# Patient Record
Sex: FEMALE | Race: WHITE | NOT HISPANIC OR LATINO | URBAN - METROPOLITAN AREA
[De-identification: names, ages, dates, MRNs, and addresses within clinical notes are randomized per-mention and may not be internally consistent; named-entity substitution may affect disease eponyms.]

---

## 2017-02-14 ENCOUNTER — INPATIENT (INPATIENT)
Facility: HOSPITAL | Age: 82
LOS: 2 days | Discharge: NURSING, REHAB OR LONG-TERM CARE | End: 2017-02-17
Attending: INTERNAL MEDICINE | Admitting: INTERNAL MEDICINE
Payer: MEDICARE

## 2017-02-14 VITALS — WEIGHT: 154.98 LBS | HEIGHT: 68 IN

## 2017-02-14 DIAGNOSIS — I48.91 UNSPECIFIED ATRIAL FIBRILLATION: ICD-10-CM

## 2017-02-14 DIAGNOSIS — R04.0 EPISTAXIS: ICD-10-CM

## 2017-02-14 DIAGNOSIS — K92.2 GASTROINTESTINAL HEMORRHAGE, UNSPECIFIED: ICD-10-CM

## 2017-02-14 DIAGNOSIS — D64.9 ANEMIA, UNSPECIFIED: ICD-10-CM

## 2017-02-14 DIAGNOSIS — K92.1 MELENA: ICD-10-CM

## 2017-02-14 DIAGNOSIS — E03.9 HYPOTHYROIDISM, UNSPECIFIED: ICD-10-CM

## 2017-02-14 DIAGNOSIS — M48.00 SPINAL STENOSIS, SITE UNSPECIFIED: ICD-10-CM

## 2017-02-14 DIAGNOSIS — I10 ESSENTIAL (PRIMARY) HYPERTENSION: ICD-10-CM

## 2017-02-14 LAB
ALBUMIN SERPL ELPH-MCNC: 2.6 G/DL — LOW (ref 3.3–5)
ALP SERPL-CCNC: 72 U/L — SIGNIFICANT CHANGE UP (ref 40–120)
ALT FLD-CCNC: 8 U/L — LOW (ref 12–78)
ANION GAP SERPL CALC-SCNC: 10 MMOL/L — SIGNIFICANT CHANGE UP (ref 5–17)
APTT BLD: 47 SEC — HIGH (ref 27.5–37.4)
AST SERPL-CCNC: 15 U/L — SIGNIFICANT CHANGE UP (ref 15–37)
BASOPHILS # BLD AUTO: 0.1 K/UL — SIGNIFICANT CHANGE UP (ref 0–0.2)
BASOPHILS NFR BLD AUTO: 1.1 % — SIGNIFICANT CHANGE UP (ref 0–2)
BILIRUB SERPL-MCNC: 0.6 MG/DL — SIGNIFICANT CHANGE UP (ref 0.2–1.2)
BLD GP AB SCN SERPL QL: SIGNIFICANT CHANGE UP
BUN SERPL-MCNC: 31 MG/DL — HIGH (ref 7–23)
CALCIUM SERPL-MCNC: 7.5 MG/DL — LOW (ref 8.5–10.1)
CHLORIDE SERPL-SCNC: 106 MMOL/L — SIGNIFICANT CHANGE UP (ref 96–108)
CO2 SERPL-SCNC: 26 MMOL/L — SIGNIFICANT CHANGE UP (ref 22–31)
CREAT SERPL-MCNC: 1.66 MG/DL — HIGH (ref 0.5–1.3)
EOSINOPHIL # BLD AUTO: 0 K/UL — SIGNIFICANT CHANGE UP (ref 0–0.5)
EOSINOPHIL NFR BLD AUTO: 0.1 % — SIGNIFICANT CHANGE UP (ref 0–6)
GLUCOSE SERPL-MCNC: 118 MG/DL — HIGH (ref 70–99)
HCT VFR BLD CALC: 21.4 % — LOW (ref 34.5–45)
HGB BLD-MCNC: 6.9 G/DL — CRITICAL LOW (ref 11.5–15.5)
INR BLD: 5.15 RATIO — CRITICAL HIGH (ref 0.88–1.16)
LYMPHOCYTES # BLD AUTO: 17 % — SIGNIFICANT CHANGE UP (ref 13–44)
LYMPHOCYTES # BLD AUTO: 2.3 K/UL — SIGNIFICANT CHANGE UP (ref 1–3.3)
MCHC RBC-ENTMCNC: 32.4 GM/DL — SIGNIFICANT CHANGE UP (ref 32–36)
MCHC RBC-ENTMCNC: 33.4 PG — SIGNIFICANT CHANGE UP (ref 27–34)
MCV RBC AUTO: 103.3 FL — HIGH (ref 80–100)
MONOCYTES # BLD AUTO: 0.8 K/UL — SIGNIFICANT CHANGE UP (ref 0–0.9)
MONOCYTES NFR BLD AUTO: 6.1 % — SIGNIFICANT CHANGE UP (ref 2–14)
NEUTROPHILS # BLD AUTO: 10.2 K/UL — HIGH (ref 1.8–7.4)
NEUTROPHILS NFR BLD AUTO: 75.8 % — SIGNIFICANT CHANGE UP (ref 43–77)
PLATELET # BLD AUTO: 165 K/UL — SIGNIFICANT CHANGE UP (ref 150–400)
POTASSIUM SERPL-MCNC: 4.8 MMOL/L — SIGNIFICANT CHANGE UP (ref 3.5–5.3)
POTASSIUM SERPL-SCNC: 4.8 MMOL/L — SIGNIFICANT CHANGE UP (ref 3.5–5.3)
PROT SERPL-MCNC: 5.4 GM/DL — LOW (ref 6–8.3)
PROTHROM AB SERPL-ACNC: 57.6 SEC — HIGH (ref 10–13.1)
RBC # BLD: 2.07 M/UL — LOW (ref 3.8–5.2)
RBC # FLD: 14.5 % — SIGNIFICANT CHANGE UP (ref 10.3–14.5)
SODIUM SERPL-SCNC: 142 MMOL/L — SIGNIFICANT CHANGE UP (ref 135–145)
TROPONIN I SERPL-MCNC: 0.02 NG/ML — SIGNIFICANT CHANGE UP (ref 0.01–0.04)
TROPONIN I SERPL-MCNC: 0.02 NG/ML — SIGNIFICANT CHANGE UP (ref 0.01–0.04)
TYPE + AB SCN PNL BLD: SIGNIFICANT CHANGE UP
WBC # BLD: 13.4 K/UL — HIGH (ref 3.8–10.5)
WBC # FLD AUTO: 13.4 K/UL — HIGH (ref 3.8–10.5)

## 2017-02-14 PROCEDURE — 93010 ELECTROCARDIOGRAM REPORT: CPT

## 2017-02-14 PROCEDURE — 99291 CRITICAL CARE FIRST HOUR: CPT

## 2017-02-14 RX ORDER — SODIUM CHLORIDE 9 MG/ML
1000 INJECTION INTRAMUSCULAR; INTRAVENOUS; SUBCUTANEOUS ONCE
Qty: 0 | Refills: 0 | Status: COMPLETED | OUTPATIENT
Start: 2017-02-14 | End: 2017-02-14

## 2017-02-14 RX ORDER — PHYTONADIONE (VIT K1) 5 MG
5 TABLET ORAL ONCE
Qty: 0 | Refills: 0 | Status: COMPLETED | OUTPATIENT
Start: 2017-02-14 | End: 2017-02-14

## 2017-02-14 RX ORDER — PANTOPRAZOLE SODIUM 20 MG/1
40 TABLET, DELAYED RELEASE ORAL EVERY 12 HOURS
Qty: 0 | Refills: 0 | Status: DISCONTINUED | OUTPATIENT
Start: 2017-02-14 | End: 2017-02-15

## 2017-02-14 RX ADMIN — SODIUM CHLORIDE 1000 MILLILITER(S): 9 INJECTION INTRAMUSCULAR; INTRAVENOUS; SUBCUTANEOUS at 15:05

## 2017-02-14 RX ADMIN — Medication 101 MILLIGRAM(S): at 17:04

## 2017-02-14 NOTE — H&P ADULT - NSHPLABSRESULTS_GEN_ALL_CORE
CBC Full  -  ( 14 Feb 2017 15:14 )  WBC Count : 13.4 K/uL  Hemoglobin : 6.9 g/dL  Hematocrit : 21.4 %  Platelet Count - Automated : 165 K/uL  Mean Cell Volume : 103.3 fl  Mean Cell Hemoglobin : 33.4 pg  Mean Cell Hemoglobin Concentration : 32.4 gm/dL  Auto Neutrophil # : 10.2 K/uL  Auto Lymphocyte # : 2.3 K/uL  Auto Monocyte # : 0.8 K/uL  Auto Eosinophil # : 0.0 K/uL  Auto Basophil # : 0.1 K/uL  Auto Neutrophil % : 75.8 %  Auto Lymphocyte % : 17.0 %  Auto Monocyte % : 6.1 %  Auto Eosinophil % : 0.1 %  Auto Basophil % : 1.1 %    14 Feb 2017 15:14    142    |  106    |  31     ----------------------------<  118    4.8     |  26     |  1.66     Ca    7.5        14 Feb 2017 15:14    TPro  5.4    /  Alb  2.6    /  TBili  0.6    /  DBili  x      /  AST  15     /  ALT  8      /  AlkPhos  72     14 Feb 2017 15:14    PT/INR - ( 14 Feb 2017 15:14 )   PT: 57.6 sec;   INR: 5.15 ratio         PTT - ( 14 Feb 2017 15:14 )  PTT:47.0 sec    Vital Signs Last 24 Hrs  T(C): 36.8, Max: 36.8 (02-14 @ 18:40)  T(F): 98.3, Max: 98.3 (02-14 @ 19:30)  HR: 60 (60 - 66)  BP: 118/70 (65/45 - 126/67)  BP(mean): --  RR: 15 (14 - 19)  SpO2: 100% (88% - 100%)    Vital Signs Last 24 Hrs  T(C): 36.8, Max: 36.8 (02-14 @ 18:40)  T(F): 98.3, Max: 98.3 (02-14 @ 19:30)  HR: 60 (60 - 66)  BP: 118/70 (65/45 - 126/67)  BP(mean): --  RR: 15 (14 - 19)  SpO2: 100% (88% - 100%)

## 2017-02-14 NOTE — ED PROVIDER NOTE - MEDICAL DECISION MAKING DETAILS
Elderly female, on coumadin for a fib, with recent nosebleeds and black stools for one week, c/o weaknes for a few days.  Pt initially hypotensive with melanotic stools but no active diarrheaor vomiting. Pt resuscitated initially with fluids, the prbc and ffr and vit k to reverse coumadin.  Pt improving.  Pt unable to have ct angio to pinpoint source of bleeding due to high creatinine.  [place] [facility] [place] and recommend continuing resuscitation.  After bp improved to greater than 100, intensivist declined pt for ICU.  Pt alert and awake and in NAD.  Daughter at bedside.  Pt and daughter informed of results.

## 2017-02-14 NOTE — ED STATDOCS - DETAILS:
I, Billy Oviedo MD, performed the initial face to face bedside interview with this patient regarding history of present illness and determined that the patient should be seen in the main ED.  The history, was documented by the scribe in my presence and I attest to the accuracy of the documentation.

## 2017-02-14 NOTE — ED PROVIDER NOTE - DETAILS:
Documentation shared with rah Musa as noted.  Agree with notes Documentation shared with rah Musa as noted.  Agree with notes.

## 2017-02-14 NOTE — H&P ADULT - PSH
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

## 2017-02-14 NOTE — ED STATDOCS - PROGRESS NOTE DETAILS
Bienvenidothomas Singh: 85 y/o female with PMHx of afib on coumadin presents to the ED brought in by family c/o generalized weakness. Family at bedside states that they noticed dark blood in the toilet today and that over the last few weeks pt has been having intermittent episode of diarrhea. Family also reports pt having a nose bleed. Currently pt has no other complaints and denies chest pain, SOB and vomiting. PMD Dr. Jacome. Pt sent to the main for further eval and tx.

## 2017-02-14 NOTE — ED PROVIDER NOTE - PMH
Anemia    Atrial Fibrillation  2007  on baby aspirin to maintain as per Dr Fernandez  CAD (Coronary Artery Disease) of Bypass Graft  bypass times 1,  12/2007 [facility]  Cardiac Arrest  2007- lead to CABG and mitral valve repair  Cataract  implants 2007  HTN - Hypertension  1988  Hyperlipidemia    Hypothyroid  last TFT's 2 weeks ago - normal  Mitral valve repair  CAD 12/2007 placed on coumadin to remain as per Dr Fernandez  Osteoarthritis  spine, joints  Pacemaker  LACW 2010 - Medtronic ERENDIRA/ [id number] - [place] /[facility]  Spinal Stenosis

## 2017-02-14 NOTE — ED PROVIDER NOTE - CHPI ED SYMPTOMS NEG
no decreased eating/drinking/no chills/no fever/no pain/no dizziness/no numbness/no nausea/no tingling/no vomiting

## 2017-02-14 NOTE — ED PROVIDER NOTE - OBJECTIVE STATEMENT
83 y/o female with a h/o anemia, c/o generalized weakness over the passed few weeks. Daughter at bedside pt has had frequent episodes of epistaxis during this winter. +epistaxis today. 85 y/o female with a h/o anemia, thyroidectomy, c/o generalized weakness over the passed few weeks. Daughter at bedside pt has had frequent episodes of epistaxis during this winter. +epistaxis today. Pt reports dark stools x1 week. Dr. Duff (PMD). 85 y/o female with a h/o anemia, thyroidectomy, c/o generalized weakness over the passed few weeks. Daughter at bedside pt has had frequent episodes of epistaxis during this winter. +epistaxis today. Pt reports dark stools x1 week. Dr. Duff (PMD).Pt had dark stools may years ago and had gi workup which could not locate the source of the bleed.  No colonoscopy or gi doctor recently. Pt is on coumadin for a fib.  Unsure of hx of chf.

## 2017-02-14 NOTE — H&P ADULT - NSHPPHYSICALEXAM_GEN_ALL_CORE
PHYSICAL EXAM:      Constitutional: NAD, well-groomed, well-developed  HEENT: PERRLA, EOMI, Normal Hearing  Neck: No LAD, No JVD  Back: No CVA tenderness  Respiratory: CTA B/l Cardiovascular: S1 and S2, RRR,   Gastrointestinal: BS+, soft, NT/ND  Extremities: No peripheral edema  Vascular: 2+ peripheral pulses  Neurological: A/O x 3, no focal deficits  Psychiatric: Normal mood, normal affect  Musculoskeletal: 5/5 strength b/l upper and lower extremities  Skin: No rashes

## 2017-02-14 NOTE — H&P ADULT - PROBLEM SELECTOR PLAN 1
-Coumadin held  -NPO  -IVF as needed  -s/p 4 units of blood   -s/p 2 U FFP  -s/p  Vitamin K  -Monitor CBC Q6h  -Transfuse PRN maintain hgb >8  -GI to scope once INR lower  - Pt has not noted any recurrent bleeding since admission -Coumadin held  -NPO  -IVF as needed  -IV PPI  -s/p 4 units of blood   -s/p 2 U FFP  -s/p  Vitamin K  -Monitor CBC Q6h  -Transfuse PRN maintain hgb >8  -GI to scope once INR lower  - Pt has not noted any recurrent bleeding since admission -pt was complaining of epigastric pain for the past 2 days. Currently denies epigastric pain. Likely UGIB vs LGIB.  -Coumadin held  -NPO  -IVF as needed  -IV PPI  -s/p 4 units of blood   -s/p 2 U FFP  -s/p  Vitamin K  -Monitor CBC Q6h  -Transfuse PRN maintain hgb >8  -GI to scope once INR lower  - Pt has not noted any recurrent bleeding since admission

## 2017-02-14 NOTE — ED PROVIDER NOTE - CRITICAL CARE PROVIDED
documentation/consult w/ pt's family directly relating to pts condition/direct patient care (not related to procedure)

## 2017-02-14 NOTE — H&P ADULT - ASSESSMENT
Pt is 83 y/o F with PMHx as above admitted with supratheraputic INR with epistaxis and GI bleed. Pt given 4 units of blood, Vitamin K and 2 uFFP in ED and 1L of NS bolus due to Hypotension and Gastroenetrology consult appreciated.  Admit to telemetry for monitoring due to recurrent blood loss and significant Cardiac history.

## 2017-02-14 NOTE — ED PROVIDER NOTE - CARE PLAN
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

## 2017-02-14 NOTE — ED PROVIDER NOTE - NS ED MD SCRIBE ATTENDING SCRIBE SECTIONS
REVIEW OF SYSTEMS/PAST MEDICAL/SURGICAL/SOCIAL HISTORY/PHYSICAL EXAM/HISTORY OF PRESENT ILLNESS/RESULTS

## 2017-02-14 NOTE — H&P ADULT - PMH
Anemia    Atrial Fibrillation  2007  on baby aspirin to maintain as per Dr Fernandez  CAD (Coronary Artery Disease) of Bypass Graft  bypass times 1,  12/2007 [facility]  Cardiac Arrest  2007- lead to CABG and mitral valve repair  Cataract  implants 2007  HTN - Hypertension  1988  Hyperlipidemia    Hypothyroid  last TFT's 2 weeks ago - normal  Mitral valve repair  CAD 12/2007 placed on coumadin to remain as per Dr Fernandez  Osteoarthritis  spine, joints  Pacemaker  LACW 2010 - Medtronic ERENDIRA/ [id number] - [place] /[facility]  Spinal Stenosis

## 2017-02-14 NOTE — CONSULT NOTE ADULT - PROBLEM SELECTOR RECOMMENDATION 2
continue transfusion of prbc and ffp  pt bp improving with fluids and transfusion  will need gi endoscopic (most likely lower gi bleed) evaluation when stable and inr improved and further corrected  ICU to evaluate  dr ayan grace to assume care tomorrow  npo for now

## 2017-02-14 NOTE — H&P ADULT - HISTORY OF PRESENT ILLNESS
83 y/o female with a h/o anemia, thyroidectomy, atrial fibrilation on coumadin, MI with cardiac arrest resulting in CABG, MV repair  c/o epistaxis and generalized weakness over the passed few weeks wit hseveral episodes of black stools and intermittent diarrhea. Daughter at bedside pt has had frequent episodes of epistaxis during this winter. Epistaxis worse today and lasting over 1 hr intermittently. Pt notes that she has never had any history of GI bleed in the past. Notes having colonoscopy one year ago which was normal at the time. She recieves coumadin monitoring from PCP. 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 Daughter at bedside pt has had frequent episodes of epistaxis during this winter. Epistaxis worse today and lasting over 1 hr intermittently. Pt notes that she has never had any history of GI bleed in the past. Notes having colonoscopy one year ago which was normal at the time. She recieves coumadin monitoring from PCP.

## 2017-02-14 NOTE — CONSULT NOTE ADULT - SUBJECTIVE AND OBJECTIVE BOX
Patient is a 84y old  Female who presents with a chief complaint of GI bleed    HPI:  85yo female brought to ER by family with GI bleed. She was found to have mulitple episodes of dark stool, dark marroon in color.  Also with increased diarrhea last 2 weeks. denies abdominal pain chest pain, or SOB.  notes some weakness. no further episodes      PAST MEDICAL & SURGICAL HISTORY:  Cataract: implants   Cardiac Arrest: - lead to CABG and mitral valve repair  Spinal Stenosis  Osteoarthritis: spine, joints  Hypothyroid: last TFT&#x27;s 2 weeks ago - normal  CAD (Coronary Artery Disease) of Bypass Graft: bypass times 1,  2007 [facility]  Mitral valve repair: CAD 2007 placed on coumadin to remain as per Dr Fernandez  Hyperlipidemia  HTN - Hypertension:   Pacemaker: LACW  - [employer] ADDROI/ [id number] - [facility]  Atrial Fibrillation:   on baby aspirin to maintain as per Dr Fernandez  Depression: 3 years  History of Tonsillectomy: as child  Blepharoptosis:  - bilateral  partial thyroidectomy:   Breast Cyst: left - benign   hysterectomy:   Facet block: of spine   Spinal Disorder: spinal stimulator trial  - then removed  pacemaker: LACW  Medtronic ADDROI/[id number]  mitral valve repair/ CAB bypass times 1 - [facility]      MEDICATIONS  (STANDING):  see med rec    MEDICATIONS  (PRN):      Allergies    amoxicillin (Rash)  Daypro (Other)  Keflex (Rash)  Tikosyn (Unknown)    Intolerances        SOCIAL HISTORY: lives with son. no tob no etoh    FAMILY HISTORY: noncontributory      REVIEW OF SYSTEMS:    CONSTITUTIONAL: No fevers or chills. notes some weakness  EYES/ENT: No visual changes;  No vertigo or throat pain   NECK: No pain or stiffness  RESPIRATORY: No cough, wheezing, hemoptysis; No shortness of breath  CARDIOVASCULAR: No chest pain or palpitations  GASTROINTESTINAL: No abdominal or epigastric pain. No nausea, vomiting, or hematemesis; recent diarrhea now with hematochezia  GENITOURINARY: No dysuria, frequency or hematuria  NEUROLOGICAL: No numbness or weakness  SKIN: No itching, burning, rashes, or lesions   PSYCH: Normal mood and affect  All other review of systems is negative unless indicated above.    Vital Signs Last 24 Hrs  T(C): 36.7, Max: 36.7 ( @ 19:00)  T(F): 98, Max: 98 ( @ 19:00)  HR: 60 (60 - 66)  BP: 103/59 (65/45 - 103/59)  BP(mean): --  RR: 17 (15 - 19)  SpO2: 95% (88% - 100%)    PHYSICAL EXAM:    Constitutional: NAD, well-developed  HEENT: MMM  Neck: No LAD  Respiratory: CTAB  Cardiovascular: S1 and S2, irregular  Gastrointestinal: BS+, soft, NT/ND  Extremities: No peripheral edema  Vascular: 2+ peripheral pulses  Neurological: A/O x 3, no focal deficits  Psychiatric: Normal mood, normal affect  Skin: No rashes  rectal per er - dark marroon stool  LABS:                        6.9    13.4  )-----------( 165      ( 2017 15:14 )             21.4     2017 15:14    142    |  106    |  31     ----------------------------<  118    4.8     |  26     |  1.66     Ca    7.5        2017 15:14    TPro  5.4    /  Alb  2.6    /  TBili  0.6    /  DBili  x      /  AST  15     /  ALT  8      /  AlkPhos  72     2017 15:14    PT/INR - ( 2017 15:14 )   PT: 57.6 sec;   INR: 5.15 ratio         PTT - ( 2017 15:14 )  PTT:47.0 sec  LIVER FUNCTIONS - ( 2017 15:14 )  Alb: 2.6 g/dL / Pro: 5.4 gm/dL / ALK PHOS: 72 U/L / ALT: 8 U/L / AST: 15 U/L / GGT: x             RADIOLOGY & ADDITIONAL STUDIES:

## 2017-02-15 DIAGNOSIS — N17.9 ACUTE KIDNEY FAILURE, UNSPECIFIED: ICD-10-CM

## 2017-02-15 DIAGNOSIS — I25.810 ATHEROSCLEROSIS OF CORONARY ARTERY BYPASS GRAFT(S) WITHOUT ANGINA PECTORIS: ICD-10-CM

## 2017-02-15 DIAGNOSIS — D64.9 ANEMIA, UNSPECIFIED: ICD-10-CM

## 2017-02-15 LAB
ANION GAP SERPL CALC-SCNC: 11 MMOL/L — SIGNIFICANT CHANGE UP (ref 5–17)
APTT BLD: 33.2 SEC — SIGNIFICANT CHANGE UP (ref 27.5–37.4)
BUN SERPL-MCNC: 32 MG/DL — HIGH (ref 7–23)
CALCIUM SERPL-MCNC: 7.6 MG/DL — LOW (ref 8.5–10.1)
CHLORIDE SERPL-SCNC: 109 MMOL/L — HIGH (ref 96–108)
CO2 SERPL-SCNC: 25 MMOL/L — SIGNIFICANT CHANGE UP (ref 22–31)
CREAT SERPL-MCNC: 1.51 MG/DL — HIGH (ref 0.5–1.3)
GLUCOSE SERPL-MCNC: 87 MG/DL — SIGNIFICANT CHANGE UP (ref 70–99)
HCT VFR BLD CALC: 27.9 % — LOW (ref 34.5–45)
HCT VFR BLD CALC: 28.9 % — LOW (ref 34.5–45)
HCT VFR BLD CALC: 30.2 % — LOW (ref 34.5–45)
HCT VFR BLD CALC: 30.7 % — LOW (ref 34.5–45)
HGB BLD-MCNC: 9 G/DL — LOW (ref 11.5–15.5)
HGB BLD-MCNC: 9.2 G/DL — LOW (ref 11.5–15.5)
HGB BLD-MCNC: 9.4 G/DL — LOW (ref 11.5–15.5)
HGB BLD-MCNC: 9.8 G/DL — LOW (ref 11.5–15.5)
INR BLD: 1.39 RATIO — HIGH (ref 0.88–1.16)
INR BLD: 1.57 RATIO — HIGH (ref 0.88–1.16)
MAGNESIUM SERPL-MCNC: 2.1 MG/DL — SIGNIFICANT CHANGE UP (ref 1.8–2.4)
MCHC RBC-ENTMCNC: 29.8 PG — SIGNIFICANT CHANGE UP (ref 27–34)
MCHC RBC-ENTMCNC: 30.2 PG — SIGNIFICANT CHANGE UP (ref 27–34)
MCHC RBC-ENTMCNC: 31.2 GM/DL — LOW (ref 32–36)
MCHC RBC-ENTMCNC: 31.8 GM/DL — LOW (ref 32–36)
MCV RBC AUTO: 94.9 FL — SIGNIFICANT CHANGE UP (ref 80–100)
MCV RBC AUTO: 95.5 FL — SIGNIFICANT CHANGE UP (ref 80–100)
PHOSPHATE SERPL-MCNC: 3.8 MG/DL — SIGNIFICANT CHANGE UP (ref 2.5–4.5)
PLATELET # BLD AUTO: 110 K/UL — LOW (ref 150–400)
PLATELET # BLD AUTO: 131 K/UL — LOW (ref 150–400)
POTASSIUM SERPL-MCNC: 4.1 MMOL/L — SIGNIFICANT CHANGE UP (ref 3.5–5.3)
POTASSIUM SERPL-SCNC: 4.1 MMOL/L — SIGNIFICANT CHANGE UP (ref 3.5–5.3)
PROTHROM AB SERPL-ACNC: 15.3 SEC — HIGH (ref 10–13.1)
PROTHROM AB SERPL-ACNC: 17.3 SEC — HIGH (ref 10–13.1)
RBC # BLD: 3.04 M/UL — LOW (ref 3.8–5.2)
RBC # BLD: 3.16 M/UL — LOW (ref 3.8–5.2)
RBC # FLD: 17.4 % — HIGH (ref 10.3–14.5)
RBC # FLD: 17.5 % — HIGH (ref 10.3–14.5)
SODIUM SERPL-SCNC: 145 MMOL/L — SIGNIFICANT CHANGE UP (ref 135–145)
TSH SERPL-MCNC: 0.88 UU/ML — SIGNIFICANT CHANGE UP (ref 0.36–3.74)
WBC # BLD: 8.5 K/UL — SIGNIFICANT CHANGE UP (ref 3.8–10.5)
WBC # BLD: 8.9 K/UL — SIGNIFICANT CHANGE UP (ref 3.8–10.5)
WBC # FLD AUTO: 8.5 K/UL — SIGNIFICANT CHANGE UP (ref 3.8–10.5)
WBC # FLD AUTO: 8.9 K/UL — SIGNIFICANT CHANGE UP (ref 3.8–10.5)

## 2017-02-15 RX ORDER — PANTOPRAZOLE SODIUM 20 MG/1
8 TABLET, DELAYED RELEASE ORAL
Qty: 80 | Refills: 0 | Status: DISCONTINUED | OUTPATIENT
Start: 2017-02-15 | End: 2017-02-15

## 2017-02-15 RX ADMIN — PANTOPRAZOLE SODIUM 10 MG/HR: 20 TABLET, DELAYED RELEASE ORAL at 03:16

## 2017-02-15 RX ADMIN — PANTOPRAZOLE SODIUM 10 MG/HR: 20 TABLET, DELAYED RELEASE ORAL at 14:18

## 2017-02-15 NOTE — CHART NOTE - NSCHARTNOTEFT_GEN_A_CORE
EGD: Normal    Rec:  No plans for additional GI evaluation unless bleeding recurs (favor bleeding was from epistaxis)

## 2017-02-15 NOTE — CONSULT NOTE ADULT - ASSESSMENT
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 Admission INR was over 5. after 2 units of plasma and vitamin K, the INR is 1.5. EGD showed no causes of the bleed.   continues with melena and epistaxis.    Recommendations:     No coumadin at this time. Will need to reassess at a later date.  watching HCT and will transfuse as needed.  consider ENT evaluation  consider colonoscopy to determine cause of the melena.

## 2017-02-15 NOTE — PROGRESS NOTE ADULT - PROBLEM SELECTOR PLAN 1
continue protonix 40mg q12 IV  may start clear liquid diet  Upper GI endoscopy today  monitor H/H q6 h  s/p 4RBC  s/p 2FFP  s/p Vit K  s/p 1l N/S bolus  monitor vitals q4h

## 2017-02-15 NOTE — CONSULT NOTE ADULT - SUBJECTIVE AND OBJECTIVE BOX
HPI:  83 y/o female with a h/o anemia, thyroidectomy, atrial fibrilation on coumadin, MI with cardiac arrest resulting in CABG, MV repair  c/o epistaxis and generalized weakness over the passed few weeks wit several episodes of black stools and intermittent diarrhea. Daughter at bedside pt has had frequent episodes of epistaxis during this winter. Epistaxis worse today and lasting over 1 hr intermittently. Pt notes that she has never had any history of GI bleed in the past. Notes having colonoscopy one year ago which was normal at the time. She receives coumadin monitoring from PCP. (2017 23:00)    2/15- Upper endoscopy negative. continues with melena and epistaxis.   denies chest pain or sob.   Initial INR over 5. after vitamin K and blood transfusions, the INR is 1.5.  4 units of PRBC and 2 units of plasma    PAST MEDICAL & SURGICAL HISTORY:  Anemia  Cataract: implants   Cardiac Arrest: - lead to CABG and mitral valve repair  Spinal Stenosis  Osteoarthritis: spine, joints  Hypothyroid: last TFT&#x27;s 2 weeks ago - normal  CAD (Coronary Artery Disease) of Bypass Graft: bypass times 1,  2007 [facility]  Mitral valve repair: CAD 2007 placed on coumadin to remain as per Dr Fernandez  Hyperlipidemia  HTN - Hypertension:   Pacemaker: LACW  - [employer] ADDROI/ [id number] - [facility]  Atrial Fibrillation:   on baby aspirin to maintain as per Dr Fernandez  Depression: 3 years  History of Tonsillectomy: as child  Blepharoptosis:  - bilateral  partial thyroidectomy:   Breast Cyst: left - benign   hysterectomy:   Facet block: of spine   Spinal Disorder: spinal stimulator trial  - then removed  pacemaker: LACW  Medtronic ADDROI/[id number]  mitral valve repair/ CAB bypass times 1 - [facility]    MEDICATIONS  (STANDING):    MEDICATIONS  (PRN):    Allergies    amoxicillin (Rash)  Daypro (Other)  Keflex (Rash)  Tikosyn (Unknown)    Intolerances      FAMILY HISTORY:        REVIEW OF SYSTEMS:    CONSTITUTIONAL: No weakness, fevers or chills  EYES/ENT: No visual changes;  No vertigo or throat pain   NECK: No pain or stiffness  RESPIRATORY: No cough, wheezing, hemoptysis; No shortness of breath  CARDIOVASCULAR: No chest pain or palpitations  GASTROINTESTINAL: No abdominal or epigastric pain. No nausea, vomiting, or hematemesis; No diarrhea or constipation. No melena or hematochezia.  GENITOURINARY: No dysuria, frequency or hematuria  NEUROLOGICAL: No numbness or weakness  SKIN: No itching, burning, rashes, or lesions   All other review of systems is negative unless indicated above      PHYSICAL EXAM:  Daily     Daily   Vital Signs Last 24 Hrs  T(C): 36.7, Max: 37.1 (02-15 @ 04:26)  T(F): 98, Max: 98.7 (02-15 @ 04:26)  HR: 60 (60 - 66)  BP: 116/78 (108/56 - 126/67)  BP(mean): --  RR: 18 (14 - 19)  SpO2: 95% (95% - 100%)    Constitutional: NAD, awake and alert, well-developed  HEENT: PERR, EOMI, Normal Hearing, MMM  Neck: Soft and supple, No LAD, No JVD  Respiratory: Breath sounds are clear bilaterally, No wheezing, rales or rhonchi  Cardiovascular: S1 and S2, regular rate and rhythm, no Murmurs, gallops or rubs  Gastrointestinal: Bowel Sounds present, soft, nontender, nondistended, no guarding, no rebound  Extremities: No peripheral edema  Vascular: 2+ peripheral pulses  Neurological: A/O x 3, no focal deficits  Musculoskeletal: 5/5 strength b/l upper and lower extremities  Skin: No rashes    LABS: All Labs Reviewed:                        9.0    x     )-----------( x        ( 15 Feb 2017 08:51 )             27.9     15 Feb 2017 06:04    145    |  109    |  32     ----------------------------<  87     4.1     |  25     |  1.51     Ca    7.6        15 Feb 2017 06:04  Phos  3.8       15 Feb 2017 06:04  Mg     2.1       15 Feb 2017 06:04    TPro  5.4    /  Alb  2.6    /  TBili  0.6    /  DBili  x      /  AST  15     /  ALT  8      /  AlkPhos  72     2017 15:14    PT/INR - ( 15 Feb 2017 06:04 )   PT: 17.3 sec;   INR: 1.57 ratio         PTT - ( 15 Feb 2017 06:04 )  PTT:33.2 sec  CARDIAC MARKERS ( 2017 20:46 )  0.023 ng/mL / x     / x     / x     / x      CARDIAC MARKERS ( 2017 15:14 )  0.019 ng/mL / x     / x     / x     / x          Blood Culture:     CARDIOLOGY TESTING:  Tele: Paced Rhythm

## 2017-02-15 NOTE — CHART NOTE - NSCHARTNOTEFT_GEN_A_CORE
CHIEF COMPLAINT: Melena and epistaxis- past few days admitted with supra therapeutic INR 5.15    HPI:  85 y/o female with a h/o atrial fibrillation on coumadin, CABG, MV repair ,anemia, thyroidectomy,   c/o black tarry stools( with intermittent diarrhea) and  epistaxis for the last couple of days .Epistaxis worsened yesterday, so her daughter brought her to the ED .According to the patient never had any history of GI bleed in the past. Notes having colonoscopy one year ago which was normal at the time. She receives coumadin monitoring from PCP for her atrial fibrillation.  Given 4 PRBc, 2x FFP and Vit K and 1000ml bolus NS  SUBJECTIVE: Pt is feeling better AXOx3. Has not had a black BM since last night. Denies abdominal pain, nausea, light headed ness or chest pain, No new complaints/ symptoms from overnight   Hemodynamically stable.     REVIEW OF SYSTEMS:    CONSTITUTIONAL: No weakness, fevers or chills    RESPIRATORY: No cough, wheezing, hemoptysis; No shortness of breath  CARDIOVASCULAR: No chest pain or palpitations  GASTROINTESTINAL: No abdominal or epigastric pain. No nausea, vomiting, or hematemesis; No diarrhea or constipation. No melena or hematochezia.  GENITOURINARY: No dysuria, frequency or hematuria    Vital Signs Last 24 Hrs  T(C): 36.7, Max: 37.1 (02-15 @ 04:26)  T(F): 98, Max: 98.7 (02-15 @ 04:26)  HR: 66 (60 - 66)  BP: 118/66 (65/45 - 126/67)  BP(mean): --  RR: 18 (14 - 19)  SpO2: 95% (88% - 100%)    I&O's Summary      CAPILLARY BLOOD GLUCOSE      PHYSICAL EXAM:    Constitutional: NAD, awake and alert AxOx3   No JVD  Respiratory: Breath sounds are clear bilaterally, No wheezing, rales or rhonchi  Cardiovascular: S1 and S2,systolic murmur  Gastrointestinal: Bowel Sounds present, soft, nontender, nondistended, no guarding, no rebound  Extremities: No peripheral edema  Vascular: 2+ peripheral pulses  Hemoglobin + Hematocrit (02.15.17 @ 08:51)    Hemoglobin: 9.0 g/dL    Hematocrit: 27.9 %    MEDICATIONS:  MEDICATIONS  (STANDING):  pantoprazole Infusion 8mG/Hr IV Continuous <Continuous>      LABS: All Labs Reviewed:                        9.0    x     )-----------( x        ( 15 Feb 2017 08:51 )             27.9     15 Feb 2017 06:04    145    |  109    |  32     ----------------------------<  87     4.1     |  25     |  1.51     Ca    7.6        15 Feb 2017 06:04  Phos  3.8       15 Feb 2017 06:04  Mg     2.1       15 Feb 2017 06:04    TPro  5.4    /  Alb  2.6    /  TBili  0.6    /  DBili  x      /  AST  15     /  ALT  8      /  AlkPhos  72     14 Feb 2017 15:14    PT/INR - ( 15 Feb 2017 06:04 )   PT: 17.3 sec;   INR: 1.57 ratio         PTT - ( 15 Feb 2017 06:04 )  PTT:33.2 sec  CARDIAC MARKERS ( 14 Feb 2017 20:46 )  0.023 ng/mL / x     / x     / x     / x      CARDIAC MARKERS ( 14 Feb 2017 15:14 )  0.019 ng/mL / x     / x     / x     / x              DISPOSITION:

## 2017-02-15 NOTE — CHART NOTE - NSCHARTNOTEFT_GEN_A_CORE
Pt has seen Dr. Boubacar Rosario for GI in past.  He did colonoscopy and upper endoscopy about a year ago for anemia, heme + stool.   I spoke with Dr. Rosario who will resume GI care for this patient.

## 2017-02-15 NOTE — PROVIDER CONTACT NOTE (OTHER) - SITUATION
NOTIFIED SERVICE; SPOKE TO RAVEN
Office aware, spoke with Aurora.
Office aware; Dr. DULCE Freeman (oncall) is on case. Spoke with Alycia

## 2017-02-15 NOTE — PROGRESS NOTE ADULT - PROBLEM SELECTOR PLAN 2
hold ASA 2/2 GI hemorrhage hold ASA 2/2 GI hemorrhage  hold anti HTN medication  hold atenolol  keep Hb>8 in view of CAD

## 2017-02-15 NOTE — CHART NOTE - NSCHARTNOTEFT_GEN_A_CORE
Patient seen -- bleeding seems to have stopped    2015 EGD was negative and colonoscopy with probably rectal prolapse    Favor that bleeding was due to nose bleed but also takes a lot of ASA so will pursue EGD today. Patient agreeable.

## 2017-02-16 DIAGNOSIS — E03.9 HYPOTHYROIDISM, UNSPECIFIED: ICD-10-CM

## 2017-02-16 LAB
ANION GAP SERPL CALC-SCNC: 7 MMOL/L — SIGNIFICANT CHANGE UP (ref 5–17)
BUN SERPL-MCNC: 29 MG/DL — HIGH (ref 7–23)
CALCIUM SERPL-MCNC: 7.5 MG/DL — LOW (ref 8.5–10.1)
CHLORIDE SERPL-SCNC: 109 MMOL/L — HIGH (ref 96–108)
CO2 SERPL-SCNC: 28 MMOL/L — SIGNIFICANT CHANGE UP (ref 22–31)
CREAT SERPL-MCNC: 1.25 MG/DL — SIGNIFICANT CHANGE UP (ref 0.5–1.3)
GLUCOSE SERPL-MCNC: 90 MG/DL — SIGNIFICANT CHANGE UP (ref 70–99)
HCT VFR BLD CALC: 27.3 % — LOW (ref 34.5–45)
HGB BLD-MCNC: 9 G/DL — LOW (ref 11.5–15.5)
INR BLD: 1.21 RATIO — HIGH (ref 0.88–1.16)
MCHC RBC-ENTMCNC: 31.1 PG — SIGNIFICANT CHANGE UP (ref 27–34)
MCHC RBC-ENTMCNC: 32.9 GM/DL — SIGNIFICANT CHANGE UP (ref 32–36)
MCV RBC AUTO: 94.4 FL — SIGNIFICANT CHANGE UP (ref 80–100)
PLATELET # BLD AUTO: 119 K/UL — LOW (ref 150–400)
POTASSIUM SERPL-MCNC: 3.8 MMOL/L — SIGNIFICANT CHANGE UP (ref 3.5–5.3)
POTASSIUM SERPL-SCNC: 3.8 MMOL/L — SIGNIFICANT CHANGE UP (ref 3.5–5.3)
PROTHROM AB SERPL-ACNC: 13.3 SEC — HIGH (ref 10–13.1)
RBC # BLD: 2.9 M/UL — LOW (ref 3.8–5.2)
RBC # FLD: 17.1 % — HIGH (ref 10.3–14.5)
SODIUM SERPL-SCNC: 144 MMOL/L — SIGNIFICANT CHANGE UP (ref 135–145)
WBC # BLD: 7.1 K/UL — SIGNIFICANT CHANGE UP (ref 3.8–10.5)
WBC # FLD AUTO: 7.1 K/UL — SIGNIFICANT CHANGE UP (ref 3.8–10.5)

## 2017-02-16 RX ORDER — ESCITALOPRAM OXALATE 10 MG/1
10 TABLET, FILM COATED ORAL DAILY
Qty: 0 | Refills: 0 | Status: DISCONTINUED | OUTPATIENT
Start: 2017-02-16 | End: 2017-02-17

## 2017-02-16 RX ORDER — SODIUM CHLORIDE 0.65 %
1 AEROSOL, SPRAY (ML) NASAL
Qty: 0 | Refills: 0 | Status: DISCONTINUED | OUTPATIENT
Start: 2017-02-16 | End: 2017-02-17

## 2017-02-16 RX ORDER — PANTOPRAZOLE SODIUM 20 MG/1
0 TABLET, DELAYED RELEASE ORAL
Qty: 0 | Refills: 0 | COMMUNITY

## 2017-02-16 RX ORDER — PANTOPRAZOLE SODIUM 20 MG/1
40 TABLET, DELAYED RELEASE ORAL
Qty: 0 | Refills: 0 | COMMUNITY

## 2017-02-16 RX ORDER — MEMANTINE HYDROCHLORIDE 10 MG/1
5 TABLET ORAL DAILY
Qty: 0 | Refills: 0 | Status: DISCONTINUED | OUTPATIENT
Start: 2017-02-16 | End: 2017-02-17

## 2017-02-16 RX ORDER — DONEPEZIL HYDROCHLORIDE 10 MG/1
10 TABLET, FILM COATED ORAL AT BEDTIME
Qty: 0 | Refills: 0 | Status: DISCONTINUED | OUTPATIENT
Start: 2017-02-16 | End: 2017-02-17

## 2017-02-16 RX ORDER — GABAPENTIN 400 MG/1
100 CAPSULE ORAL
Qty: 0 | Refills: 0 | Status: DISCONTINUED | OUTPATIENT
Start: 2017-02-16 | End: 2017-02-17

## 2017-02-16 RX ORDER — DONEPEZIL HYDROCHLORIDE 10 MG/1
0 TABLET, FILM COATED ORAL
Qty: 0 | Refills: 0 | COMMUNITY

## 2017-02-16 RX ORDER — LEVOTHYROXINE SODIUM 125 MCG
88 TABLET ORAL DAILY
Qty: 0 | Refills: 0 | Status: DISCONTINUED | OUTPATIENT
Start: 2017-02-16 | End: 2017-02-17

## 2017-02-16 RX ORDER — FUROSEMIDE 40 MG
0 TABLET ORAL
Qty: 0 | Refills: 0 | COMMUNITY

## 2017-02-16 RX ORDER — LEVOTHYROXINE SODIUM 125 MCG
0 TABLET ORAL
Qty: 0 | Refills: 0 | COMMUNITY

## 2017-02-16 RX ORDER — DONEPEZIL HYDROCHLORIDE 10 MG/1
1 TABLET, FILM COATED ORAL
Qty: 0 | Refills: 0 | COMMUNITY
Start: 2017-02-16

## 2017-02-16 RX ORDER — MEMANTINE HYDROCHLORIDE 10 MG/1
0 TABLET ORAL
Qty: 0 | Refills: 0 | COMMUNITY

## 2017-02-16 RX ORDER — LOSARTAN POTASSIUM 100 MG/1
0 TABLET, FILM COATED ORAL
Qty: 0 | Refills: 0 | COMMUNITY

## 2017-02-16 RX ORDER — ALPRAZOLAM 0.25 MG
0.25 TABLET ORAL AT BEDTIME
Qty: 0 | Refills: 0 | Status: DISCONTINUED | OUTPATIENT
Start: 2017-02-16 | End: 2017-02-17

## 2017-02-16 RX ORDER — DONEPEZIL HYDROCHLORIDE 10 MG/1
10 TABLET, FILM COATED ORAL
Qty: 0 | Refills: 0 | COMMUNITY

## 2017-02-16 RX ORDER — ATENOLOL 25 MG/1
0 TABLET ORAL
Qty: 0 | Refills: 0 | COMMUNITY

## 2017-02-16 RX ADMIN — MEMANTINE HYDROCHLORIDE 5 MILLIGRAM(S): 10 TABLET ORAL at 20:17

## 2017-02-16 RX ADMIN — Medication 88 MICROGRAM(S): at 19:01

## 2017-02-16 RX ADMIN — ESCITALOPRAM OXALATE 10 MILLIGRAM(S): 10 TABLET, FILM COATED ORAL at 20:17

## 2017-02-16 RX ADMIN — Medication 0.25 MILLIGRAM(S): at 21:31

## 2017-02-16 RX ADMIN — DONEPEZIL HYDROCHLORIDE 10 MILLIGRAM(S): 10 TABLET, FILM COATED ORAL at 21:32

## 2017-02-16 RX ADMIN — GABAPENTIN 100 MILLIGRAM(S): 400 CAPSULE ORAL at 19:01

## 2017-02-16 NOTE — PROGRESS NOTE ADULT - PROBLEM SELECTOR PLAN 1
continue protonix 40mg q12 IV  may start clear liquid diet  Upper GI endoscopy done- no acute source of bleed identified  monitor H/H q6 h  s/p 4RBC  s/p 2FFP  s/p Vit K  s/p 1l N/S bolus  monitor vitals q4h.  If melena persists will proceed with bleeding scan vs if there is bright red blood per rectum again will need to do CTA

## 2017-02-16 NOTE — PHYSICAL THERAPY INITIAL EVALUATION ADULT - PERTINENT HX OF CURRENT PROBLEM, REHAB EVAL
pt adm due to c/o epistaxis, gen. weakness, dark stool. INR >5. pt transfused. INR 1.39 and H/H 9/27.3 today.

## 2017-02-16 NOTE — PHYSICAL THERAPY INITIAL EVALUATION ADULT - ADDITIONAL COMMENTS
pt lives w/ son, pt on ground floor, 2 NICKO w/ rail. amb w/o AD in home, w/ SAC outside of home. Also has RW.

## 2017-02-16 NOTE — PHYSICAL THERAPY INITIAL EVALUATION ADULT - CRITERIA FOR SKILLED THERAPEUTIC INTERVENTIONS
anticipated discharge recommendation/risk reduction/prevention/anticipated equipment needs at discharge/therapy frequency/impairments found/predicted duration of therapy intervention/functional limitations in following categories/rehab potential

## 2017-02-17 VITALS — HEART RATE: 64 BPM | SYSTOLIC BLOOD PRESSURE: 142 MMHG | DIASTOLIC BLOOD PRESSURE: 71 MMHG

## 2017-02-17 DIAGNOSIS — E78.5 HYPERLIPIDEMIA, UNSPECIFIED: ICD-10-CM

## 2017-02-17 LAB
ANION GAP SERPL CALC-SCNC: 9 MMOL/L — SIGNIFICANT CHANGE UP (ref 5–17)
BUN SERPL-MCNC: 19 MG/DL — SIGNIFICANT CHANGE UP (ref 7–23)
CALCIUM SERPL-MCNC: 7.9 MG/DL — LOW (ref 8.5–10.1)
CHLORIDE SERPL-SCNC: 111 MMOL/L — HIGH (ref 96–108)
CO2 SERPL-SCNC: 26 MMOL/L — SIGNIFICANT CHANGE UP (ref 22–31)
CREAT SERPL-MCNC: 1 MG/DL — SIGNIFICANT CHANGE UP (ref 0.5–1.3)
GLUCOSE SERPL-MCNC: 90 MG/DL — SIGNIFICANT CHANGE UP (ref 70–99)
HCT VFR BLD CALC: 28.7 % — LOW (ref 34.5–45)
HGB BLD-MCNC: 9.4 G/DL — LOW (ref 11.5–15.5)
INR BLD: 1.21 RATIO — HIGH (ref 0.88–1.16)
IRON SATN MFR SERPL: 22 UG/DL — LOW (ref 30–160)
MCHC RBC-ENTMCNC: 30.9 PG — SIGNIFICANT CHANGE UP (ref 27–34)
MCHC RBC-ENTMCNC: 32.7 GM/DL — SIGNIFICANT CHANGE UP (ref 32–36)
MCV RBC AUTO: 94.4 FL — SIGNIFICANT CHANGE UP (ref 80–100)
PLATELET # BLD AUTO: 120 K/UL — LOW (ref 150–400)
POTASSIUM SERPL-MCNC: 3.6 MMOL/L — SIGNIFICANT CHANGE UP (ref 3.5–5.3)
POTASSIUM SERPL-SCNC: 3.6 MMOL/L — SIGNIFICANT CHANGE UP (ref 3.5–5.3)
PROTHROM AB SERPL-ACNC: 13.3 SEC — HIGH (ref 10–13.1)
RBC # BLD: 3.04 M/UL — LOW (ref 3.8–5.2)
RBC # FLD: 16.5 % — HIGH (ref 10.3–14.5)
SODIUM SERPL-SCNC: 146 MMOL/L — HIGH (ref 135–145)
WBC # BLD: 6.3 K/UL — SIGNIFICANT CHANGE UP (ref 3.8–10.5)
WBC # FLD AUTO: 6.3 K/UL — SIGNIFICANT CHANGE UP (ref 3.8–10.5)

## 2017-02-17 RX ORDER — OXYCODONE HYDROCHLORIDE 5 MG/1
5 TABLET ORAL ONCE
Qty: 0 | Refills: 0 | Status: DISCONTINUED | OUTPATIENT
Start: 2017-02-17 | End: 2017-02-17

## 2017-02-17 RX ORDER — WARFARIN SODIUM 2.5 MG/1
0 TABLET ORAL
Qty: 0 | Refills: 0 | COMMUNITY

## 2017-02-17 RX ORDER — ATENOLOL 25 MG/1
50 TABLET ORAL DAILY
Qty: 0 | Refills: 0 | Status: DISCONTINUED | OUTPATIENT
Start: 2017-02-17 | End: 2017-02-17

## 2017-02-17 RX ORDER — MEMANTINE HYDROCHLORIDE 10 MG/1
1 TABLET ORAL
Qty: 0 | Refills: 0 | COMMUNITY
Start: 2017-02-17

## 2017-02-17 RX ORDER — DONEPEZIL HYDROCHLORIDE 10 MG/1
1 TABLET, FILM COATED ORAL
Qty: 0 | Refills: 0 | COMMUNITY
Start: 2017-02-17

## 2017-02-17 RX ORDER — FUROSEMIDE 40 MG
20 TABLET ORAL DAILY
Qty: 0 | Refills: 0 | Status: DISCONTINUED | OUTPATIENT
Start: 2017-02-17 | End: 2017-02-17

## 2017-02-17 RX ORDER — LOSARTAN POTASSIUM 100 MG/1
25 TABLET, FILM COATED ORAL DAILY
Qty: 0 | Refills: 0 | Status: DISCONTINUED | OUTPATIENT
Start: 2017-02-17 | End: 2017-02-17

## 2017-02-17 RX ORDER — GABAPENTIN 400 MG/1
200 CAPSULE ORAL
Qty: 0 | Refills: 0 | COMMUNITY

## 2017-02-17 RX ORDER — MEMANTINE HYDROCHLORIDE 10 MG/1
5 TABLET ORAL
Qty: 0 | Refills: 0 | COMMUNITY

## 2017-02-17 RX ORDER — GABAPENTIN 400 MG/1
1 CAPSULE ORAL
Qty: 0 | Refills: 0 | COMMUNITY
Start: 2017-02-17

## 2017-02-17 RX ORDER — ALPRAZOLAM 0.25 MG
1 TABLET ORAL
Qty: 0 | Refills: 0 | COMMUNITY
Start: 2017-02-17

## 2017-02-17 RX ADMIN — MEMANTINE HYDROCHLORIDE 5 MILLIGRAM(S): 10 TABLET ORAL at 15:04

## 2017-02-17 RX ADMIN — ATENOLOL 50 MILLIGRAM(S): 25 TABLET ORAL at 15:40

## 2017-02-17 RX ADMIN — LOSARTAN POTASSIUM 25 MILLIGRAM(S): 100 TABLET, FILM COATED ORAL at 15:40

## 2017-02-17 RX ADMIN — Medication 20 MILLIGRAM(S): at 15:40

## 2017-02-17 RX ADMIN — Medication 88 MICROGRAM(S): at 06:23

## 2017-02-17 RX ADMIN — ESCITALOPRAM OXALATE 10 MILLIGRAM(S): 10 TABLET, FILM COATED ORAL at 15:04

## 2017-02-17 RX ADMIN — GABAPENTIN 100 MILLIGRAM(S): 400 CAPSULE ORAL at 06:23

## 2017-02-17 RX ADMIN — OXYCODONE HYDROCHLORIDE 5 MILLIGRAM(S): 5 TABLET ORAL at 15:39

## 2017-02-17 NOTE — PROGRESS NOTE ADULT - PROBLEM SELECTOR PROBLEM 5
CAD (Coronary Artery Disease) of Bypass Graft
Hypothyroid
Hypothyroid
Hypothyroidism, unspecified type

## 2017-02-17 NOTE — PROGRESS NOTE ADULT - PROBLEM SELECTOR PROBLEM 1
Gastrointestinal hemorrhage with melena

## 2017-02-17 NOTE — DISCHARGE NOTE ADULT - CARE PLAN
Principal Discharge DX:	Gastrointestinal hemorrhage with melena  Goal:	f/u h/h with PCP, cont protonix, hold coumadin until visit with PCP, f/u with GI as out patient  Secondary Diagnosis:	Anemia  Goal:	f/u h/h with PCP, cont protonix, hold coumadin until visit with PCP within 1-3 days , f/u with GI as out patient within 1-3 days  Secondary Diagnosis:	JC (acute kidney injury)  Goal:	encourage oral fluid intake and f/u renal function with PCP  Secondary Diagnosis:	Atrial Fibrillation  Goal:	cont home meds and f/u with PCP after discharge and f/u with cardiologist within 1-3 days  Secondary Diagnosis:	Epistaxis  Goal:	f/u H/H with PCP after discharge within 1-3 daysand f/u with ENT as out patient within 1-3 days Principal Discharge DX:	Gastrointestinal hemorrhage with melena  Goal:	f/u h/h with PCP, cont protonix, hold coumadin until visit with PCP, f/u with GI as out patient  Secondary Diagnosis:	Anemia  Goal:	f/u h/h with PCP, cont protonix, hold coumadin until visit with PCP within 1-3 days , f/u with GI as out patient within 1-3 days  Secondary Diagnosis:	JC (acute kidney injury)  Goal:	encourage oral fluid intake and f/u renal function with PCP  Secondary Diagnosis:	Atrial Fibrillation  Goal:	cont home meds and f/u with PCP after discharge and f/u with cardiologist within 1-3 days  Secondary Diagnosis:	Epistaxis  Goal:	f/u H/H with PCP after discharge within 1-3 daysand f/u with ENT as out patient within 1-3 days  Secondary Diagnosis:	Dementia  Instructions for follow-up, activity and diet:	continue Memantine and donepezil Principal Discharge DX:	Gastrointestinal hemorrhage with melena  Goal:	f/u h/h with PCP, cont protonix, hold coumadin until visit with PCP, f/u with GI as out patient  Instructions for follow-up, activity and diet:	as above  Secondary Diagnosis:	Anemia  Goal:	f/u h/h with PCP, cont protonix, hold coumadin until visit with PCP within 1-3 days , f/u with GI as out patient within 1-3 days  Secondary Diagnosis:	JC (acute kidney injury)  Goal:	encourage oral fluid intake and f/u renal function with PCP  Secondary Diagnosis:	Atrial Fibrillation  Goal:	cont home meds and f/u with PCP after discharge and f/u with cardiologist within 1-3 days  Secondary Diagnosis:	Epistaxis  Goal:	f/u H/H with PCP after discharge within 1-3 daysand f/u with ENT as out patient within 1-3 days  Secondary Diagnosis:	Dementia  Instructions for follow-up, activity and diet:	continue Memantine and donepezil

## 2017-02-17 NOTE — PROGRESS NOTE ADULT - PROBLEM SELECTOR PLAN 1
H/H stable  Endo H/H stable  Endoscopy done: no acute pathology identified  coumadin held  f/u with gastroenterology as out patient  continue protonix  Tolerating regular diet  Hemodynamically stable to be discharged

## 2017-02-17 NOTE — DISCHARGE NOTE ADULT - HOSPITAL COURSE
85 y/o female with a h/o atrial fibrillation on coumadin, CABG, MV repair ,anemia, thyroidectomy,   c/o black tarry stools( with intermittent diarrhea) and  epistaxis for the last couple of days .Epistaxis worsened yesterday, so her daughter brought her to the ED .According to the patient never had any history of GI bleed in the past. Notes having colonoscopy one year ago which was normal at the time. She receives coumadin monitoring from PCP for her atrial fibrillation.  Given 4 PRBc, 2x FFP and Vit K and 1000ml bolus NS  SUBJECTIVE: AXOx3. Has  had a dark red colored semisolid BM last night x1.Tolerating regular diet. Denies abdominal pain, nausea, light headed ness or chest pain.   Hemodynamically stable

## 2017-02-17 NOTE — PROGRESS NOTE ADULT - ASSESSMENT
5 y/o female with a h/o atrial fibrillation on coumadin, CABG, MV repair ,anemia, thyroidectomy,   c/o black tarry stools( with intermittent diarrhea) and  epistaxis for the last couple of days .Epistaxis worsened yesterday, so her daughter brought her to the ED .According to the patient never had any history of GI bleed in the past. Notes having colonoscopy one year ago which was normal at the time. She receives coumadin monitoring from PCP for her atrial fibrillation.  Given 4 PRBc, 2x FFP and Vit K and 1000ml bolus NS
83 y/o female with a h/o atrial fibrillation on coumadin, CABG, MV repair ,anemia, thyroidectomy,   c/o black tarry stools( with intermittent diarrhea) and  epistaxis for the last couple of days .Epistaxis worsened yesterday, so her daughter brought her to the ED .According to the patient never had any history of GI bleed in the past. Notes having colonoscopy one year ago which was normal at the time. She receives coumadin monitoring from PCP for her atrial fibrillation.
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 Admission INR was over 5. after 2 units of plasma and vitamin K, the INR is 1.5. EGD showed no causes of the bleed.   continues with melena and epistaxis.    Recommendations:  advance diet to a regular diet  no coumadin at this time   hematocrit stable.   continue protonix.  continued care as per medicine and GI.
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 Admission INR was over 5. after 2 units of plasma and vitamin K, the INR is 1.5. EGD showed no causes of the bleed.   continues with melena and epistaxis.    Recommendations:  continue regular diet  no coumadin at this time   hematocrit stable.   continue protonix.  continued care as per medicine and GI.  maybe discharged. would stay coumadin for a couple of more days and would have her primary cardiologist decide when she should restart her medication.
83 y/o female with a h/o atrial fibrillation on coumadin, CABG, MV repair ,anemia, thyroidectomy,   c/o black tarry stools( with intermittent diarrhea) and  epistaxis for the last couple of days .Epistaxis worsened yesterday, so her daughter brought her to the ED .According to the patient never had any history of GI bleed in the past. Notes having colonoscopy one year ago which was normal at the time. She receives coumadin monitoring from PCP for her atrial fibrillation.  Given 4 PRBc, 2x FFP and Vit K and 1000ml bolus NS
85 y/o female with a h/o atrial fibrillation on coumadin, CABG, MV repair ,anemia, thyroidectomy,   c/o black tarry stools( with intermittent diarrhea) and  epistaxis for the last couple of days .Epistaxis worsened yesterday, so her daughter brought her to the ED .According to the patient never had any history of GI bleed in the past. Notes having colonoscopy one year ago which was normal at the time. She receives coumadin monitoring from PCP for her atrial fibrillation.  Given 4 PRBc, 2x FFP and Vit K and 1000ml bolus NS
Imp:  Melena could definitely be explained by nose bleed but BRBPR would be more difficult to explain. HOwever, H/H stable and stool melanic today on rectal exam so still unclear.    Rec:  Observe.  If more bleeding, would go to bleeding scan (vs. CTA if very bright red blood)
84y old F with:  AF - Inc INR  Acute GIB  CAD  HTN    Plan:  Admit to Monitored Bed  BP Stable  No Acute Resp issues  NPO  No Active Bleeding > 6-hrs  Serial CBC, Coags  Transfuse PRBC, FFP prn  Fully reverse coagulopathy  Likely LGIB - Diverticular but has appeared to have stopped  Will need Colonoscopy +/- EGD  Monitor renal function  Strict I/O's  DVT prophylaxis - Compression stockings    30-min CC time

## 2017-02-17 NOTE — DISCHARGE NOTE ADULT - NS AS ACTIVITY OBS
Walking-Outdoors allowed/Stairs allowed/Driving allowed/Walking-Indoors allowed/Bathing allowed/Sex allowed/Showering allowed

## 2017-02-17 NOTE — DISCHARGE NOTE ADULT - PLAN OF CARE
f/u h/h with PCP, cont protonix, hold coumadin until visit with PCP, f/u with GI as out patient f/u h/h with PCP, cont protonix, hold coumadin until visit with PCP within 1-3 days , f/u with GI as out patient within 1-3 days encourage oral fluid intake and f/u renal function with PCP cont home meds and f/u with PCP after discharge and f/u with cardiologist within 1-3 days f/u H/H with PCP after discharge within 1-3 daysand f/u with ENT as out patient within 1-3 days continue Memantine and donepezil as above

## 2017-02-17 NOTE — PROGRESS NOTE ADULT - PROBLEM SELECTOR PLAN 5
continue thyroxine
continue thyroxine
continue:  ATENolol  Tablet 50milliGRAM(s) Oral daily  losartan 25  furosemide 20mg  f/u with cardiology
continue thyroxine 88micrograms

## 2017-02-17 NOTE — DISCHARGE NOTE ADULT - CARE PROVIDER_API CALL
Keyla Sifuentes), Internal Medicine  [address] [place] [address]  Phone: [phone]  Fax: [phone]    Elias Lujan), Cardiovascular Disease; Internal Medicine  [address] [place] [address]  Phone: [phone]  Fax: [phone]

## 2017-02-17 NOTE — DISCHARGE NOTE ADULT - PATIENT PORTAL LINK FT
“You can access the [employer] Patient Portal, offered by [facility], by registering with the following website: [email or web address]”

## 2017-02-17 NOTE — PROGRESS NOTE ADULT - SUBJECTIVE AND OBJECTIVE BOX
CHIEF COMPLAINT: Melena and epistaxis- past few days admitted with supra therapeutic INR 5.15    HPI:  85 y/o female with a h/o atrial fibrillation on coumadin, CABG, MV repair ,anemia, thyroidectomy,   c/o black tarry stools( with intermittent diarrhea) and  epistaxis for the last couple of days .Epistaxis worsened yesterday, so her daughter brought her to the ED .According to the patient never had any history of GI bleed in the past. Notes having colonoscopy one year ago which was normal at the time. She receives coumadin monitoring from PCP for her atrial fibrillation.  Given 4 PRBc, 2x FFP and Vit K and 1000ml bolus NS  SUBJECTIVE: Pt is feeling better AXOx3. Has not had a black BM since last night. Denies abdominal pain, nausea, light headed ness or chest pain, No new complaints/ symptoms from overnight   Hemodynamically stable.     REVIEW OF SYSTEMS:    CONSTITUTIONAL: No weakness, fevers or chills    RESPIRATORY: No cough, wheezing, hemoptysis; No shortness of breath  CARDIOVASCULAR: No chest pain or palpitations  GASTROINTESTINAL: No abdominal or epigastric pain. No nausea, vomiting, or hematemesis; No diarrhea or constipation. No melena or hematochezia.  GENITOURINARY: No dysuria, frequency or hematuria    Vital Signs Last 24 Hrs  T(C): 36.7, Max: 37.1 (02-15 @ 04:26)  T(F): 98, Max: 98.7 (02-15 @ 04:26)  HR: 66 (60 - 66)  BP: 118/66 (65/45 - 126/67)  BP(mean): --  RR: 18 (14 - 19)  SpO2: 95% (88% - 100%)    I&O's Summary      CAPILLARY BLOOD GLUCOSE  PHYSICAL EXAM:    Constitutional: NAD, awake and alert AxOx3   No JVD  Respiratory: Breath sounds are clear bilaterally, No wheezing, rales or rhonchi  Cardiovascular: S1 and S2,systolic murmur  Gastrointestinal: Bowel Sounds present, soft, nontender, nondistended, no guarding, no rebound  Extremities: No peripheral edema  Vascular: 2+ peripheral pulses  Hemoglobin + Hematocrit (02.15.17 @ 08:51)    Hemoglobin: 9.0 g/dL    Hematocrit: 27.9 %    MEDICATIONS:  MEDICATIONS  (STANDING):  pantoprazole Infusion 8mG/Hr IV Continuous <Continuous>      LABS: All Labs Reviewed:                        9.0    x     )-----------( x        ( 15 Feb 2017 08:51 )             27.9     15 Feb 2017 06:04    145    |  109    |  32     ----------------------------<  87       4.1     |  25     |  1.51     Ca    7.6        15 Feb 2017 06:04  Phos  3.8       15 Feb 2017 06:04  Mg     2.1       15 Feb 2017 06:04    TPro  5.4    /  Alb  2.6    /  TBili  0.6    /  DBili  x      /  AST  15     /  ALT  8      /  AlkPhos  72     14 Feb 2017 15:14    PT/INR - ( 15 Feb 2017 06:04 )   PT: 17.3 sec;   INR: 1.57 ratio         PTT - ( 15 Feb 2017 06:04 )  PTT:33.2 sec  CARDIAC MARKERS ( 14 Feb 2017 20:46 )  0.023 ng/mL / x     / x     / x     / x      CARDIAC MARKERS ( 14 Feb 2017 15:14 )  0.019 ng/mL / x     / x     / x     / x
CHIEF COMPLAINT: epistaxis and melena    HPI:  83 y/o female with a h/o atrial fibrillation on coumadin, CABG, MV repair ,anemia, thyroidectomy,   c/o black tarry stools( with intermittent diarrhea) and  epistaxis for the last couple of days .Epistaxis worsened yesterday, so her daughter brought her to the ED .According to the patient never had any history of GI bleed in the past. Notes having colonoscopy one year ago which was normal at the time. She receives coumadin monitoring from PCP for her atrial fibrillation.  Given 4 PRBc, 2x FFP and Vit K and 1000ml bolus NS  SUBJECTIVE: AXOx3. Has  had a dark red colored semisolid BM last night x1.Tolerating regular diet. Denies abdominal pain, nausea, light headed ness or chest pain.   Hemodynamically stable    SUBJECTIVE:     REVIEW OF SYSTEMS:    CONSTITUTIONAL: No weakness, fevers or chills  RESPIRATORY: No cough, wheezing, hemoptysis; No shortness of breath  CARDIOVASCULAR: No chest pain or palpitations  GASTROINTESTINAL: No abdominal or epigastric pain. No nausea, vomiting, or hematemesis; No diarrhea or constipation. No melena or hematochezia.  GENITOURINARY: No dysuria, frequency or hematuria    Vital Signs Last 24 Hrs  T(C): 36.5, Max: 37.1 (02-16 @ 16:19)  T(F): 97.7, Max: 98.8 (02-16 @ 16:19)  HR: 61 (60 - 61)  BP: 155/72 (100/45 - 155/72)  BP(mean): --  RR: 18 (18 - 18)  SpO2: 95% (95% - 98%)    I&O's Summary    I & Os for current day (as of 17 Feb 2017 09:15)  =============================================  IN: 480 ml / OUT: 0 ml / NET: 480 ml            PHYSICAL EXAM:    Constitutional: NAD, awake and alert  No JVD  Respiratory: Breath sounds are clear bilaterally, No wheezing, rales or rhonchi  Cardiovascular: S1 and S2, irregularly irregular. systolic murmrur  Gastrointestinal: Bowel Sounds present, soft, nontender, nondistended, no guarding, no rebound  Extremities: No peripheral edema      MEDICATIONS  (STANDING):  levothyroxine 88MICROGram(s) Oral daily  ALPRAZolam 0.25milliGRAM(s) Oral at bedtime  escitalopram 10milliGRAM(s) Oral daily  donepezil 10milliGRAM(s) Oral at bedtime  memantine 5milliGRAM(s) Oral daily  gabapentin 100milliGRAM(s) Oral two times a day      LABS: All Labs Reviewed:                        9.4    6.3   )-----------( 120      ( 17 Feb 2017 04:49 )             28.7     17 Feb 2017 04:49    146    |  111    |  19     ----------------------------<  90     3.6     |  26     |  1.00     Ca    7.9        17 Feb 2017 04:49      PT/INR - ( 17 Feb 2017 04:49 )   PT: 13.3 sec;   INR: 1.21 ratio               Blood Culture:     RADIOLOGY/EKG:    DVT PPX:    ADVANCED DIRECTIVE:    DISPOSITION:
CHIEF COMPLAINT: melena and epistaxis    HPI:  85 y/o female with a h/o atrial fibrillation on coumadin, CABG, MV repair ,anemia, thyroidectomy,   c/o black tarry stools( with intermittent diarrhea) and  epistaxis for the last couple of days .Epistaxis worsened yesterday, so her daughter brought her to the ED .According to the patient never had any history of GI bleed in the past. Notes having colonoscopy one year ago which was normal at the time. She receives coumadin monitoring from PCP for her atrial fibrillation.  Given 4 PRBc, 2x FFP and Vit K and 1000ml bolus NS  SUBJECTIVE: AXOx3.Tolerating regular diet. Denies abdominal pain, nausea, light headed ness or chest pain.  Hemodynamically stable        REVIEW OF SYSTEMS:    CONSTITUTIONAL: No weakness, fevers or chills  EYES/ENT: No visual changes;  No vertigo or throat pain   NECK: No pain or stiffness  RESPIRATORY: No cough, wheezing, hemoptysis; No shortness of breath  CARDIOVASCULAR: No chest pain or palpitations  GASTROINTESTINAL: No abdominal or epigastric pain. No nausea, vomiting, or hematemesis; No diarrhea or constipation. No melena or hematochezia.  GENITOURINARY: No dysuria, frequency or hematuria  NEUROLOGICAL: No numbness or weakness  SKIN: No itching, burning, rashes, or lesions   All other review of systems is negative unless indicated above    Vital Signs Last 24 Hrs  T(C): 36.5, Max: 36.5 (02-17 @ 05:15)  T(F): 97.7, Max: 97.7 (02-17 @ 05:15)  HR: 64 (59 - 64)  BP: 142/71 (122/49 - 155/72)  BP(mean): --  RR: 18 (18 - 18)  SpO2: 98% (95% - 98%)    I&O's Summary    I & Os for current day (as of 17 Feb 2017 16:58)  =============================================  IN: 480 ml / OUT: 0 ml / NET: 480 ml      CAPILLARY BLOOD GLUCOSE      PHYSICAL EXAM:    Constitutional: NAD, awake and alert, well-developed  HEENT: PERR, EOMI, Normal Hearing, MMM  Neck: Soft and supple, No LAD, No JVD  Respiratory: Breath sounds are clear bilaterally, No wheezing, rales or rhonchi  Cardiovascular: S1 and S2, regular rate and rhythm, no Murmurs, gallops or rubs  Gastrointestinal: Bowel Sounds present, soft, nontender, nondistended, no guarding, no rebound  Extremities: No peripheral edema  Vascular: 2+ peripheral pulses  Neurological: A/O x 3, no focal deficits  Musculoskeletal: 5/5 strength b/l upper and lower extremities  Skin: No rashes    MEDICATIONS:  MEDICATIONS  (STANDING):  levothyroxine 88MICROGram(s) Oral daily  ALPRAZolam 0.25milliGRAM(s) Oral at bedtime  escitalopram 10milliGRAM(s) Oral daily  donepezil 10milliGRAM(s) Oral at bedtime  memantine 5milliGRAM(s) Oral daily  gabapentin 100milliGRAM(s) Oral two times a day  ATENolol  Tablet 50milliGRAM(s) Oral daily  losartan 25milliGRAM(s) Oral daily  furosemide    Tablet 20milliGRAM(s) Oral daily      LABS: All Labs Reviewed:                        9.4    6.3   )-----------( 120      ( 17 Feb 2017 04:49 )             28.7     17 Feb 2017 04:49    146    |  111    |  19     ----------------------------<  90     3.6     |  26     |  1.00     Ca    7.9        17 Feb 2017 04:49      PT/INR - ( 17 Feb 2017 04:49 )   PT: 13.3 sec;   INR: 1.21 ratio               Blood Culture:     RADIOLOGY/EKG:    DVT PPX:    ADVANCED DIRECTIVE:    DISPOSITION:
CHIEF COMPLAINT: epistaxia and GI bleed    HPI: 85 y/o female with a h/o atrial fibrillation on coumadin, CABG, MV repair ,anemia, thyroidectomy,   c/o black tarry stools( with intermittent diarrhea) and  epistaxis for the last couple of days .Epistaxis worsened yesterday, so her daughter brought her to the ED .According to the patient never had any history of GI bleed in the past. Notes having colonoscopy one year ago which was normal at the time. She receives coumadin monitoring from PCP for her atrial fibrillation.  Given 4 PRBc, 2x FFP and Vit K and 1000ml bolus NS  SUBJECTIVE: Pt is feeling better AXOx3. Has  had a dark red colored semisolid BM last night x1.Denies abdominal pain, nausea, light headed ness or chest pain.   Hemodynamically stable.     CONSTITUTIONAL: No weakness, fevers or chills  RESPIRATORY: No cough, wheezing, hemoptysis; No shortness of breath  CARDIOVASCULAR: No chest pain or palpitations  GASTROINTESTINAL: No abdominal or epigastric pain. No nausea, vomiting, or hematemesis; No diarrhea or constipation. No melena or hematochezia.  GENITOURINARY: No dysuria, frequency or hematuria      Vital Signs Last 24 Hrs  T(C): 36.4, Max: 36.7 (02-15 @ 16:59)  T(F): 97.6, Max: 98 (02-15 @ 16:59)  HR: 60 (60 - 65)  BP: 100/45 (100/45 - 116/78)  BP(mean): --  RR: 18 (18 - 18)  SpO2: 96% (95% - 98%)    I&O's Summary  I & Os for 24h ending 16 Feb 2017 07:00  =============================================  IN: 230 ml / OUT: 200 ml / NET: 30 ml    I & Os for current day (as of 16 Feb 2017 14:50)  =============================================  IN: 360 ml / OUT: 0 ml / NET: 360 ml        PHYSICAL EXAM:    Constitutional: NAD, awake and alert, well-developed  No JVD  Respiratory: Breath sounds are clear bilaterally, No wheezing, rales or rhonchi  Cardiovascular: S1 and S2, irrregularly irregular  systolic murmurs  Gastrointestinal: Bowel Sounds present, soft, nontender, nondistended, no guarding, no rebound  Extremities: No peripheral edema              MEDICATIONS  (STANDING):  levothyroxine 88MICROGram(s) Oral daily  ALPRAZolam 0.25milliGRAM(s) Oral at bedtime  escitalopram 10milliGRAM(s) Oral daily  donepezil 10milliGRAM(s) Oral at bedtime  memantine 5milliGRAM(s) Oral daily  gabapentin 100milliGRAM(s) Oral two times a day      LABS: All Labs Reviewed:                        9.0    7.1   )-----------( 119      ( 16 Feb 2017 06:09 )             27.3     16 Feb 2017 06:09    144    |  109    |  29     ----------------------------<  90     3.8     |  28     |  1.25     Ca    7.5        16 Feb 2017 06:09  Phos  3.8       15 Feb 2017 06:04  Mg     2.1       15 Feb 2017 06:04    TPro  5.4    /  Alb  2.6    /  TBili  0.6    /  DBili  x      /  AST  15     /  ALT  8      /  AlkPhos  72     14 Feb 2017 15:14    PT/INR - ( 16 Feb 2017 10:14 )   PT: 13.3 sec;   INR: 1.21 ratio         PTT - ( 15 Feb 2017 06:04 )  PTT:33.2 sec  CARDIAC MARKERS ( 14 Feb 2017 20:46 )  0.023 ng/mL / x     / x     / x     / x      CARDIAC MARKERS ( 14 Feb 2017 15:14 )  0.019 ng/mL / x     / x     / x     / x          Blood Culture:     RADIOLOGY/EKG:    DVT PPX:    ADVANCED DIRECTIVE:    DISPOSITION:
HPI: Patient examined on 2/14.   83 y/o female with a h/o anemia, thyroidectomy, atrial fibrilation on coumadin, MI with cardiac arrest resulting in CABG, MV repair  c/o epistaxis and generalized weakness over the passed few weeks wit hseveral episodes of black stools and intermittent diarrhea. Daughter at bedside pt has had frequent episodes of epistaxis during this winter. Epistaxis worse today and lasting over 1 hr intermittently. Pt notes that she has never had any history of GI bleed in the past. Notes having colonoscopy one year ago which was normal at the time. On stool martineziac had bloody stool. Has not had a BM since arrival > 6-hrs ago.  No CP or SOB.      Allergies    amoxicillin (Rash)  Daypro (Other)  Keflex (Rash)  Tikosyn (Unknown)    Intolerances        Weight (kg): 70.3 (02-14 @ 16:18)  BMI (kg/m2): 22.2 (02-14 @ 16:18)    ICU Vital Signs Last 24 Hrs  T(C): 36.8, Max: 36.8 (02-14 @ 18:40)  T(F): 98.3, Max: 98.3 (02-14 @ 19:30)  HR: 60 (60 - 66)  BP: 125/65 (65/45 - 126/67)  BP(mean): --  ABP: --  ABP(mean): --  RR: 16 (14 - 19)  SpO2: 100% (88% - 100%)          I&O's Summary      Date/Time: 02-14 @ 15:14  Hematocrit: 21.4 %<L> [34.5 - 45.0]  Hemoglobin: 6.9 g/dL<LL> [11.5 - 15.5]  WBC: 13.4 K/uL<H> [3.8 - 10.5]  Platlets: 165 K/uL [150 - 400]  ALT/SGPT: 8 U/L<L> [12 - 78]  Albumin: 2.6 g/dL<L> [3.3 - 5.0]  Alk Phos: 72 U/L [40 - 120]  Anion Gap: 10 mmol/L [5 - 17]  AST/SGOT: 15 U/L [15 - 37]  Bilirubin Direct: --  Bilirubin Total: 0.6 mg/dL [0.2 - 1.2]  BUN: 31 mg/dL<H> [7 - 23]  Ca: 7.5 mg/dL<L> [8.5 - 10.1]  CO2: 26 mmol/L [22 - 31]  Cl: 106 mmol/L [96 - 108]  Creatinine: 1.66 mg/dL<H> [0.50 - 1.30]  eGFR : 32 mL/min/1.73M2<L>  eGFR Non-: 28 mL/min/1.73M2<L>  Glucose: 118 mg/dL<H> [70 - 99]  K: 4.8 mmol/L [3.5 - 5.3]  Protein Total: 5.4 gm/dL<L> [6.0 - 8.3]  Na: 142 mmol/L [135 - 145]  Mg: --  Phosphorus: --  Lactate: --      CARDIAC MARKERS ( 14 Feb 2017 20:46 )  0.023 ng/mL / x     / x     / x     / x      CARDIAC MARKERS ( 14 Feb 2017 15:14 )  0.019 ng/mL / x     / x     / x     / x                DVT Prophylaxis:    Advanced Directives:  Discussed with:    Visit Information:    ** Time is exclusive of billed procedures and/or teaching and/or routine family updates.
I was called to the pt due to episode of epistaxis and GI bleed  pt seen and examined  Epistaxis subsided. Large bright bloody BM seen.  pt feels fine, no abdominal pain  Vital Signs Last 24 Hrs  T(C): 36.7, Max: 37.1 (02-15 @ 04:26)  T(F): 98, Max: 98.7 (02-15 @ 04:26)  HR: 60 (60 - 66)  BP: 116/78 (102/60 - 126/67)  BP(mean): --  RR: 18 (14 - 19)  SpO2: 95% (95% - 100%)     Abdomen- soft, non tender    A/P  Called Dr. Cuenca (covering for )  1.Stat CBC, INR, transfuse PRN  2.In case of recurrent bleeding overnight obtain CTA and proceed with embolization with IR  3.No prep for colonoscopy tonight  4.Called Dr. Machado- advised rocket packing in case of recurrent bleed      d/w night team, Dr. Manzo
Patient is a 84y old  Female who presents with a chief complaint of Nasal bleed and dark stools (14 Feb 2017 23:00)      SUBJECTIVE:   83 y/o female with a h/o anemia, thyroidectomy, atrial fibrilation on coumadin, MI with cardiac arrest resulting in CABG, MV repair  c/o epistaxis and generalized weakness over the passed few weeks wit several episodes of black stools and intermittent diarrhea. Daughter at bedside pt has had frequent episodes of epistaxis during this winter. Epistaxis worse today and lasting over 1 hr intermittently. Pt notes that she has never had any history of GI bleed in the past. Notes having colonoscopy one year ago which was normal at the time. She receives coumadin monitoring from PCP. (14 Feb 2017 23:00)    2/15- Upper endoscopy negative. continues with melena and epistaxis.   denies chest pain or sob.   Initial INR over 5. after vitamin K and blood transfusions, the INR is 1.5.  4 units of PRBC and 2 units of plasma    2/16- no bleeding overnight. no melena. hungry  blood count stable.     2/17- no bleeding overnight from nose. no melena. tolerating full diet. blood counts stable.          Allergies    amoxicillin (Rash)  Daypro (Other)  Keflex (Rash)  Tikosyn (Unknown)    Intolerances        MEDICATIONS  (STANDING):  levothyroxine 88MICROGram(s) Oral daily  ALPRAZolam 0.25milliGRAM(s) Oral at bedtime  escitalopram 10milliGRAM(s) Oral daily  donepezil 10milliGRAM(s) Oral at bedtime  memantine 5milliGRAM(s) Oral daily  gabapentin 100milliGRAM(s) Oral two times a day    MEDICATIONS  (PRN):  sodium chloride 0.65% Nasal 1Spray(s) Both Nostrils every 1 hour PRN Nasal Congestion    REVIEW OF SYSTEMS:    RESPIRATORY: No cough, wheezing, hemoptysis; No shortness of breath  CARDIOVASCULAR: No chest pain or palpitations  All other review of systems is negative unless indicated above      PHYSICAL EXAM:  Daily     Daily   Vital Signs Last 24 Hrs  T(C): 36.5, Max: 37.1 (02-16 @ 16:19)  T(F): 97.7, Max: 98.8 (02-16 @ 16:19)  HR: 61 (60 - 61)  BP: 155/72 (100/45 - 155/72)  BP(mean): --  RR: 18 (18 - 18)  SpO2: 95% (95% - 98%)    Constitutional: NAD, awake and alert, well-developed  HEENT: PERR, EOMI, Normal Hearing, MMM  Neck: Soft and supple, No LAD, No JVD  Respiratory: Breath sounds are clear bilaterally, No wheezing, rales or rhonchi  Cardiovascular: S1 and S2, regular rate and rhythm, no Murmurs, gallops or rubs  Gastrointestinal: Bowel Sounds present, soft, nontender, nondistended, no guarding, no rebound  Extremities: No peripheral edema  Vascular: 2+ peripheral pulses  Neurological: A/O x 3, no focal deficits  Musculoskeletal: 5/5 strength b/l upper and lower extremities  Skin: No rashes    LABS: All Labs Reviewed:                        9.4    6.3   )-----------( 120      ( 17 Feb 2017 04:49 )             28.7     17 Feb 2017 04:49    146    |  111    |  19     ----------------------------<  90     3.6     |  26     |  1.00     Ca    7.9        17 Feb 2017 04:49      PT/INR - ( 17 Feb 2017 04:49 )   PT: 13.3 sec;   INR: 1.21 ratio
Patient is a 84y old  Female who presents with a chief complaint of Nasal bleed and dark stools (14 Feb 2017 23:00)      SUBJECTIVE:   85 y/o female with a h/o anemia, thyroidectomy, atrial fibrilation on coumadin, MI with cardiac arrest resulting in CABG, MV repair  c/o epistaxis and generalized weakness over the passed few weeks wit several episodes of black stools and intermittent diarrhea. Daughter at bedside pt has had frequent episodes of epistaxis during this winter. Epistaxis worse today and lasting over 1 hr intermittently. Pt notes that she has never had any history of GI bleed in the past. Notes having colonoscopy one year ago which was normal at the time. She receives coumadin monitoring from PCP. (14 Feb 2017 23:00)    2/15- Upper endoscopy negative. continues with melena and epistaxis.   denies chest pain or sob.   Initial INR over 5. after vitamin K and blood transfusions, the INR is 1.5.  4 units of PRBC and 2 units of plasma    2/16- no bleeding overnight. no melena. hungry  blood count stable.         Allergies    amoxicillin (Rash)  Daypro (Other)  Keflex (Rash)  Tikosyn (Unknown)    Intolerances        MEDICATIONS  (STANDING):    MEDICATIONS  (PRN):  sodium chloride 0.65% Nasal 1Spray(s) Both Nostrils every 1 hour PRN Nasal Congestion    REVIEW OF SYSTEMS:    RESPIRATORY: No cough, wheezing, hemoptysis; No shortness of breath  CARDIOVASCULAR: No chest pain or palpitations  All other review of systems is negative unless indicated above      PHYSICAL EXAM:  Daily     Daily   Vital Signs Last 24 Hrs  T(C): 36.7, Max: 36.7 (02-15 @ 10:57)  T(F): 98, Max: 98 (02-15 @ 10:57)  HR: 60 (60 - 66)  BP: 109/57 (103/44 - 118/66)  BP(mean): --  RR: 18 (18 - 18)  SpO2: 95% (95% - 96%)    Constitutional: NAD, awake and alert, well-developed  HEENT: PERR, EOMI, Normal Hearing, MMM  Neck: Soft and supple, No LAD, No JVD  Respiratory: Breath sounds are clear bilaterally, No wheezing, rales or rhonchi  Cardiovascular: S1 and S2, regular rate and rhythm, no Murmurs, gallops or rubs  Gastrointestinal: Bowel Sounds present, soft, nontender, nondistended, no guarding, no rebound  Extremities: No peripheral edema  Vascular: 2+ peripheral pulses  Neurological: A/O x 3, no focal deficits  Musculoskeletal: 5/5 strength b/l upper and lower extremities  Skin: No rashes    LABS: All Labs Reviewed:                        9.0    7.1   )-----------( 119      ( 16 Feb 2017 06:09 )             27.3     16 Feb 2017 06:09    144    |  109    |  29     ----------------------------<  90     3.8     |  28     |  1.25     Ca    7.5        16 Feb 2017 06:09  Phos  3.8       15 Feb 2017 06:04  Mg     2.1       15 Feb 2017 06:04    TPro  5.4    /  Alb  2.6    /  TBili  0.6    /  DBili  x      /  AST  15     /  ALT  8      /  AlkPhos  72     14 Feb 2017 15:14    PT/INR - ( 15 Feb 2017 20:01 )   PT: 15.3 sec;   INR: 1.39 ratio         PTT - ( 15 Feb 2017 06:04 )  PTT:33.2 sec  CARDIAC MARKERS ( 14 Feb 2017 20:46 )  0.023 ng/mL / x     / x     / x     / x      CARDIAC MARKERS ( 14 Feb 2017 15:14 )  0.019 ng/mL / x     / x     / x     / x              TELEMETRY/EKG:
Patient is a 84y old  Female who presents with a chief complaint of Nasal bleed and dark stools (2017 23:00)      Subective:  Had BRBPR last night by report although conflicting stories about it more melanic. None since then. No N/V today    PAST MEDICAL & SURGICAL HISTORY:  Anemia  Cataract: implants   Cardiac Arrest: - lead to CABG and mitral valve repair  Spinal Stenosis  Osteoarthritis: spine, joints  Hypothyroid: last TFT&#x27;s 2 weeks ago - normal  CAD (Coronary Artery Disease) of Bypass Graft: bypass times 1,  2007 [facility]  Mitral valve repair: CAD 2007 placed on coumadin to remain as per Dr Fernandez  Hyperlipidemia  HTN - Hypertension:   Pacemaker: LACW  - [employer] ADDROI/ [id number] - [facility]  Atrial Fibrillation:   on baby aspirin to maintain as per Dr Fernandez  Depression: 3 years  History of Tonsillectomy: as child  Blepharoptosis:  - bilateral  partial thyroidectomy:   Breast Cyst: left - benign   hysterectomy:   Facet block: of spine   Spinal Disorder: spinal stimulator trial  - then removed  pacemaker: LACW  Medtronic ADDROI/[id number]  mitral valve repair/ CAB bypass times 1 - [facility]      MEDICATIONS  (STANDING):    MEDICATIONS  (PRN):  sodium chloride 0.65% Nasal 1Spray(s) Both Nostrils every 1 hour PRN Nasal Congestion      REVIEW OF SYSTEMS:    RESPIRATORY: No shortness of breath  CARDIOVASCULAR: No chest pain  All other review of systems is negative unless indicated above.    Vital Signs Last 24 Hrs  T(C): 36.7, Max: 36.7 (-15 @ 10:57)  T(F): 98, Max: 98 (-15 @ 10:57)  HR: 60 (60 - 66)  BP: 109/57 (103/44 - 118/66)  BP(mean): --  RR: 18 (18 - 18)  SpO2: 95% (95% - 96%)    PHYSICAL EXAM:    Constitutional: NAD, well-developed  Respiratory: CTAB  Cardiovascular: S1 and S2, RRR  Gastrointestinal: BS+, soft, NT/ND  Extremities: No peripheral edema  Psychiatric: Normal mood, normal affect  Rectal: small old melena in vault    LABS:                        9.0    7.1   )-----------( 119      ( 2017 06:09 )             27.3     2017 06:09    144    |  109    |  29     ----------------------------<  90     3.8     |  28     |  1.25     Ca    7.5        2017 06:09  Phos  3.8       15 Feb 2017 06:04  Mg     2.1       15 Feb 2017 06:04    TPro  5.4    /  Alb  2.6    /  TBili  0.6    /  DBili  x      /  AST  15     /  ALT  8      /  AlkPhos  72     2017 15:14    PT/INR - ( 15 Feb 2017 20:01 )   PT: 15.3 sec;   INR: 1.39 ratio         PTT - ( 15 Feb 2017 06:04 )  PTT:33.2 sec  LIVER FUNCTIONS - ( 2017 15:14 )  Alb: 2.6 g/dL / Pro: 5.4 gm/dL / ALK PHOS: 72 U/L / ALT: 8 U/L / AST: 15 U/L / GGT: x             RADIOLOGY & ADDITIONAL STUDIES:
Pt has been seen and examined with FP resident(Dr. Faith) , resident supervised agree with a/p       Patient is a 84y old  Female who presents with a chief complaint of Nasal bleed and dark stools (14 Feb 2017 23:00)        HPI:  83 y/o female with a h/o anemia, thyroidectomy, atrial fibrilation on coumadin, MI with cardiac arrest resulting in CABG, MV repair  c/o epistaxis and generalized weakness over the passed few weeks wit several episodes of black stools and intermittent diarrhea. Daughter at bedside pt has had frequent episodes of epistaxis during this winter.     PHYSICAL EXAM:  Vital Signs Last 24 Hrs  T(C): 36.7, Max: 37.1 (02-15 @ 04:26)  T(F): 98, Max: 98.7 (02-15 @ 04:26)  HR: 66 (60 - 66)  BP: 118/66 (78/62 - 126/67)  BP(mean): --  RR: 18 (14 - 19)  SpO2: 95% (88% - 100%)  general- comfortable   -rs-aeeb,cta  -cvs-s1s2 normal   -p/a-soft,bs+  -extremity- no assymetrical swelling noted   -cns- non focal         A/P    #Epsistaxix  -ENT evaluation     #Acute blood loss anemia due to epistaxis -s/p PRBC infusion     #Afib on coumadin -ct to hold coumadin for today, ENT evaluation   -CHADS2- 1    #dvt pr     #Possible discharge tomorrow if no over night issue
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, MV repair  c/o epistaxis and generalized weakness over the passed few weeks wit several episodes of black stools and intermittent diarrhea    PHYSICAL EXAM:  Vital Signs Last 24 Hrs  T(C): 36.4, Max: 36.7 (02-15 @ 16:59)  T(F): 97.6, Max: 98 (02-15 @ 16:59)  HR: 60 (60 - 65)  BP: 100/45 (100/45 - 116/78)  BP(mean): --  RR: 18 (18 - 18)  SpO2: 96% (95% - 98%)    general- comfortable   -rs-aeeb,cta  -cvs-s1s2 normal   -p/a-soft,bs+  -extremity- no assymetrical swelling noted   -cns- non focal         A/P  Epistaxis  -ENT evaluation to follow, no more episode here, if no active bleeding then might have outpt evaluation    #possible GI bleed- EGD normal   -yesterday had episode of bowel movement which was bloody , ct to monitor her, gi follow up requested    #Acute blood loss anemia due to epistaxis -s/p PRBC infusion     #Afib on coumadin -ct to hold coumadin    #dvt pr
Pt has been seen and examined with FP resident, resident supervised agree with a/p       Patient is a 84y old  Female who presents with a chief complaint of Nasal bleed and dark stools (17 Feb 2017 13:23)        HPI:  85 y/o female with a h/o anemia, thyroidectomy, atrial fibrilation on coumadin, MI with cardiac arrest resulting in CABG, MV repair  c/o epistaxis and generalized weakness over the passed few weeks wit several episodes of black stools and intermittent diarrhea.      PHYSICAL EXAM:  Vital Signs Last 24 Hrs  T(C): 36.5, Max: 37.1 (02-16 @ 16:19)  T(F): 97.7, Max: 98.8 (02-16 @ 16:19)  HR: 59 (59 - 61)  BP: 122/49 (117/55 - 155/72)  BP(mean): --  RR: 18 (18 - 18)  SpO2: 98% (95% - 98%)  general- comfortable   -rs-aeeb,cta  -cvs-s1s2 normal   -p/a-soft,bs+  -extremity- no assymetrical swelling noted   -cns- non focal     A/P    #Epistaxis  -no more bleeding, stable   -no more bloody bowel movement and stable h/h   -d/c without coumadin and she will follow up with pmd and cardiologist for possible restarting of coumadin.  -out pt ENT evaluation    #time spent more than 30 minutes

## 2017-02-17 NOTE — PROGRESS NOTE ADULT - PROBLEM SELECTOR PLAN 1
continue to trend H/H Q8h  continue continue to trend H/H Q8h  GI consult appreciated  Coumadin held  if bleeding continues may need bleeding scan or  CTA

## 2017-02-17 NOTE — PROGRESS NOTE ADULT - PROBLEM SELECTOR PLAN 4
resolved- back  encourage oral fluid intake resolved- at  her baseline now Creatinine, Serum: 1.00 mg/dL    encourage oral fluid intake

## 2017-02-17 NOTE — PROGRESS NOTE ADULT - PROBLEM SELECTOR PLAN 6
hold Coumadin  hold ASA  hold anti HTN meds in view of her GI bleed
continue thyroxine
Hold Coumadin  Hold ACE/BB/lasix for now in setting of GI bleed

## 2017-02-17 NOTE — PROGRESS NOTE ADULT - PROVIDER SPECIALTY LIST ADULT
Cardiology
Cardiology
Critical Care
Family Medicine
Family Medicine
Gastroenterology
Hospitalist
Family Medicine

## 2017-02-17 NOTE — DISCHARGE NOTE ADULT - MEDICATION SUMMARY - MEDICATIONS TO TAKE
I will START or STAY ON the medications listed below when I get home from the hospital:    oxyCODONE  --  by mouth   -- Indication: For Spinal stenosis, unspecified spinal region    losartan  -- 25 milligram(s) by mouth once a day  -- Indication: For Essential hypertension    gabapentin 100 mg oral capsule  -- 1 cap(s) by mouth 2 times a day  -- Indication: For Spinal stenosis, unspecified spinal region    escitalopram 10 mg oral tablet  -- 1 tab(s) by mouth once a day  -- Indication: For Depression    ALPRAZolam 0.25 mg oral tablet  -- 1 tab(s) by mouth once a day (at bedtime)  -- Indication: For Anxiety    atenolol  -- 50 milligram(s) by mouth once a day  -- Indication: For Essential hypertension    Aricept  -- Indication: For Dementia    donepezil 10 mg oral tablet  -- 1 tab(s) by mouth once a day (at bedtime)  -- Indication: For Dementia    Lasix  -- 20 milligram(s) by mouth once a day  -- Indication: For CHF    memantine 5 mg oral tablet  -- 1 tab(s) by mouth once a day  -- Indication: For Dementia    Protonix  -- 40 milligram(s) by mouth once a day  -- Indication: For GERD    Synthroid  -- 88 microgram(s) by mouth once a day  -- Indication: For Hypothyroid

## 2017-02-17 NOTE — DISCHARGE NOTE ADULT - CARE PROVIDERS DIRECT ADDRESSES
,[email or web address],DirectAddress_Unknown

## 2017-02-18 LAB — FERRITIN SERPL-MCNC: 73 NG/ML — SIGNIFICANT CHANGE UP (ref 15–150)

## 2017-02-21 DIAGNOSIS — D64.9 ANEMIA, UNSPECIFIED: ICD-10-CM

## 2017-02-21 DIAGNOSIS — F03.90 UNSPECIFIED DEMENTIA, UNSPECIFIED SEVERITY, WITHOUT BEHAVIORAL DISTURBANCE, PSYCHOTIC DISTURBANCE, MOOD DISTURBANCE, AND ANXIETY: ICD-10-CM

## 2017-02-21 DIAGNOSIS — I48.91 UNSPECIFIED ATRIAL FIBRILLATION: ICD-10-CM

## 2017-02-21 DIAGNOSIS — R04.0 EPISTAXIS: ICD-10-CM

## 2017-02-21 DIAGNOSIS — Z88.8 ALLERGY STATUS TO OTHER DRUGS, MEDICAMENTS AND BIOLOGICAL SUBSTANCES STATUS: ICD-10-CM

## 2017-02-21 DIAGNOSIS — I10 ESSENTIAL (PRIMARY) HYPERTENSION: ICD-10-CM

## 2017-02-21 DIAGNOSIS — Z95.1 PRESENCE OF AORTOCORONARY BYPASS GRAFT: ICD-10-CM

## 2017-02-21 DIAGNOSIS — D62 ACUTE POSTHEMORRHAGIC ANEMIA: ICD-10-CM

## 2017-02-21 DIAGNOSIS — E03.9 HYPOTHYROIDISM, UNSPECIFIED: ICD-10-CM

## 2017-02-21 DIAGNOSIS — Z79.899 OTHER LONG TERM (CURRENT) DRUG THERAPY: ICD-10-CM

## 2017-02-21 DIAGNOSIS — Z79.01 LONG TERM (CURRENT) USE OF ANTICOAGULANTS: ICD-10-CM

## 2017-02-21 DIAGNOSIS — N17.9 ACUTE KIDNEY FAILURE, UNSPECIFIED: ICD-10-CM

## 2017-02-21 DIAGNOSIS — E78.5 HYPERLIPIDEMIA, UNSPECIFIED: ICD-10-CM

## 2017-02-21 DIAGNOSIS — K92.1 MELENA: ICD-10-CM

## 2017-02-21 DIAGNOSIS — I25.2 OLD MYOCARDIAL INFARCTION: ICD-10-CM

## 2017-02-21 DIAGNOSIS — I25.10 ATHEROSCLEROTIC HEART DISEASE OF NATIVE CORONARY ARTERY WITHOUT ANGINA PECTORIS: ICD-10-CM

## 2017-02-21 DIAGNOSIS — Z95.0 PRESENCE OF CARDIAC PACEMAKER: ICD-10-CM

## 2017-02-27 PROBLEM — D64.9 ANEMIA, UNSPECIFIED: Chronic | Status: ACTIVE | Noted: 2017-02-14

## 2017-03-06 ENCOUNTER — INPATIENT (INPATIENT)
Facility: HOSPITAL | Age: 82
LOS: 4 days | Discharge: HOME | End: 2017-03-11
Attending: INTERNAL MEDICINE | Admitting: INTERNAL MEDICINE
Payer: MEDICARE

## 2017-03-06 VITALS — HEIGHT: 70 IN | WEIGHT: 149.91 LBS

## 2017-03-06 DIAGNOSIS — E87.0 HYPEROSMOLALITY AND HYPERNATREMIA: ICD-10-CM

## 2017-03-06 DIAGNOSIS — I48.91 UNSPECIFIED ATRIAL FIBRILLATION: ICD-10-CM

## 2017-03-06 DIAGNOSIS — R04.0 EPISTAXIS: ICD-10-CM

## 2017-03-06 DIAGNOSIS — Z29.9 ENCOUNTER FOR PROPHYLACTIC MEASURES, UNSPECIFIED: ICD-10-CM

## 2017-03-06 DIAGNOSIS — D50.0 IRON DEFICIENCY ANEMIA SECONDARY TO BLOOD LOSS (CHRONIC): ICD-10-CM

## 2017-03-06 DIAGNOSIS — E86.0 DEHYDRATION: ICD-10-CM

## 2017-03-06 DIAGNOSIS — E03.9 HYPOTHYROIDISM, UNSPECIFIED: ICD-10-CM

## 2017-03-06 DIAGNOSIS — F03.90 UNSPECIFIED DEMENTIA, UNSPECIFIED SEVERITY, WITHOUT BEHAVIORAL DISTURBANCE, PSYCHOTIC DISTURBANCE, MOOD DISTURBANCE, AND ANXIETY: ICD-10-CM

## 2017-03-06 DIAGNOSIS — E78.4 OTHER HYPERLIPIDEMIA: ICD-10-CM

## 2017-03-06 LAB
ALBUMIN SERPL ELPH-MCNC: 2.6 G/DL — LOW (ref 3.3–5)
ALP SERPL-CCNC: 106 U/L — SIGNIFICANT CHANGE UP (ref 40–120)
ALT FLD-CCNC: 11 U/L — LOW (ref 12–78)
ANION GAP SERPL CALC-SCNC: 8 MMOL/L — SIGNIFICANT CHANGE UP (ref 5–17)
ANISOCYTOSIS BLD QL: SLIGHT — SIGNIFICANT CHANGE UP
APPEARANCE UR: CLEAR — SIGNIFICANT CHANGE UP
APTT BLD: 36.8 SEC — SIGNIFICANT CHANGE UP (ref 27.5–37.4)
AST SERPL-CCNC: 25 U/L — SIGNIFICANT CHANGE UP (ref 15–37)
BACTERIA # UR AUTO: (no result)
BASOPHILS # BLD AUTO: 0.2 K/UL — SIGNIFICANT CHANGE UP (ref 0–0.2)
BASOPHILS NFR BLD AUTO: 2.8 % — HIGH (ref 0–2)
BILIRUB SERPL-MCNC: 0.4 MG/DL — SIGNIFICANT CHANGE UP (ref 0.2–1.2)
BILIRUB UR-MCNC: NEGATIVE — SIGNIFICANT CHANGE UP
BLD GP AB SCN SERPL QL: SIGNIFICANT CHANGE UP
BUN SERPL-MCNC: 25 MG/DL — HIGH (ref 7–23)
CALCIUM SERPL-MCNC: 7.6 MG/DL — LOW (ref 8.5–10.1)
CHLORIDE SERPL-SCNC: 112 MMOL/L — HIGH (ref 96–108)
CO2 SERPL-SCNC: 28 MMOL/L — SIGNIFICANT CHANGE UP (ref 22–31)
COLOR SPEC: YELLOW — SIGNIFICANT CHANGE UP
COMMENT - URINE: SIGNIFICANT CHANGE UP
CREAT SERPL-MCNC: 0.94 MG/DL — SIGNIFICANT CHANGE UP (ref 0.5–1.3)
DIFF PNL FLD: (no result)
EOSINOPHIL # BLD AUTO: 0 K/UL — SIGNIFICANT CHANGE UP (ref 0–0.5)
EOSINOPHIL NFR BLD AUTO: 0.8 % — SIGNIFICANT CHANGE UP (ref 0–6)
EPI CELLS # UR: (no result)
GIANT PLATELETS BLD QL SMEAR: PRESENT — SIGNIFICANT CHANGE UP
GLUCOSE SERPL-MCNC: 84 MG/DL — SIGNIFICANT CHANGE UP (ref 70–99)
GLUCOSE UR QL: NEGATIVE MG/DL — SIGNIFICANT CHANGE UP
HCT VFR BLD CALC: 25 % — LOW (ref 34.5–45)
HCT VFR BLD CALC: 25.1 % — LOW (ref 34.5–45)
HGB BLD-MCNC: 7.8 G/DL — LOW (ref 11.5–15.5)
HGB BLD-MCNC: 7.8 G/DL — LOW (ref 11.5–15.5)
INR BLD: 2.08 RATIO — HIGH (ref 0.88–1.16)
KETONES UR-MCNC: NEGATIVE — SIGNIFICANT CHANGE UP
LEUKOCYTE ESTERASE UR-ACNC: (no result)
LYMPHOCYTES # BLD AUTO: 1.7 K/UL — SIGNIFICANT CHANGE UP (ref 1–3.3)
LYMPHOCYTES # BLD AUTO: 31.5 % — SIGNIFICANT CHANGE UP (ref 13–44)
MACROCYTES BLD QL: SIGNIFICANT CHANGE UP
MANUAL DIF COMMENT BLD-IMP: SIGNIFICANT CHANGE UP
MCHC RBC-ENTMCNC: 31 PG — SIGNIFICANT CHANGE UP (ref 27–34)
MCHC RBC-ENTMCNC: 31.1 GM/DL — LOW (ref 32–36)
MCV RBC AUTO: 99.5 FL — SIGNIFICANT CHANGE UP (ref 80–100)
MONOCYTES # BLD AUTO: 0.4 K/UL — SIGNIFICANT CHANGE UP (ref 0–0.9)
MONOCYTES NFR BLD AUTO: 8 % — SIGNIFICANT CHANGE UP (ref 2–14)
NEUTROPHILS # BLD AUTO: 3.1 K/UL — SIGNIFICANT CHANGE UP (ref 1.8–7.4)
NEUTROPHILS NFR BLD AUTO: 56.8 % — SIGNIFICANT CHANGE UP (ref 43–77)
NITRITE UR-MCNC: NEGATIVE — SIGNIFICANT CHANGE UP
OVALOCYTES BLD QL SMEAR: SLIGHT — SIGNIFICANT CHANGE UP
PH UR: 6 — SIGNIFICANT CHANGE UP (ref 4.8–8)
PLAT MORPH BLD: NORMAL — SIGNIFICANT CHANGE UP
PLATELET # BLD AUTO: 221 K/UL — SIGNIFICANT CHANGE UP (ref 150–400)
POIKILOCYTOSIS BLD QL AUTO: SLIGHT — SIGNIFICANT CHANGE UP
POLYCHROMASIA BLD QL SMEAR: SLIGHT — SIGNIFICANT CHANGE UP
POTASSIUM SERPL-MCNC: 4.3 MMOL/L — SIGNIFICANT CHANGE UP (ref 3.5–5.3)
POTASSIUM SERPL-SCNC: 4.3 MMOL/L — SIGNIFICANT CHANGE UP (ref 3.5–5.3)
PROT SERPL-MCNC: 5.6 GM/DL — LOW (ref 6–8.3)
PROT UR-MCNC: NEGATIVE MG/DL — SIGNIFICANT CHANGE UP
PROTHROM AB SERPL-ACNC: 23 SEC — HIGH (ref 10–13.1)
RBC # BLD: 2.51 M/UL — LOW (ref 3.8–5.2)
RBC # FLD: 18.9 % — HIGH (ref 10.3–14.5)
RBC BLD AUTO: (no result)
RBC CASTS # UR COMP ASSIST: (no result) /HPF (ref 0–4)
SODIUM SERPL-SCNC: 148 MMOL/L — HIGH (ref 135–145)
SP GR SPEC: 1 — LOW (ref 1.01–1.02)
TOXIC GRANULES BLD QL SMEAR: PRESENT — SIGNIFICANT CHANGE UP
TYPE + AB SCN PNL BLD: SIGNIFICANT CHANGE UP
UROBILINOGEN FLD QL: NEGATIVE MG/DL — SIGNIFICANT CHANGE UP
WBC # BLD: 5.4 K/UL — SIGNIFICANT CHANGE UP (ref 3.8–10.5)
WBC # FLD AUTO: 5.4 K/UL — SIGNIFICANT CHANGE UP (ref 3.8–10.5)
WBC UR QL: SIGNIFICANT CHANGE UP

## 2017-03-06 PROCEDURE — 93010 ELECTROCARDIOGRAM REPORT: CPT

## 2017-03-06 PROCEDURE — 99285 EMERGENCY DEPT VISIT HI MDM: CPT

## 2017-03-06 RX ORDER — DONEPEZIL HYDROCHLORIDE 10 MG/1
10 TABLET, FILM COATED ORAL AT BEDTIME
Qty: 0 | Refills: 0 | Status: DISCONTINUED | OUTPATIENT
Start: 2017-03-06 | End: 2017-03-11

## 2017-03-06 RX ORDER — ESCITALOPRAM OXALATE 10 MG/1
10 TABLET, FILM COATED ORAL DAILY
Qty: 0 | Refills: 0 | Status: DISCONTINUED | OUTPATIENT
Start: 2017-03-06 | End: 2017-03-11

## 2017-03-06 RX ORDER — FUROSEMIDE 40 MG
20 TABLET ORAL ONCE
Qty: 0 | Refills: 0 | Status: DISCONTINUED | OUTPATIENT
Start: 2017-03-06 | End: 2017-03-06

## 2017-03-06 RX ORDER — SODIUM CHLORIDE 9 MG/ML
500 INJECTION INTRAMUSCULAR; INTRAVENOUS; SUBCUTANEOUS ONCE
Qty: 0 | Refills: 0 | Status: COMPLETED | OUTPATIENT
Start: 2017-03-06 | End: 2017-03-06

## 2017-03-06 RX ORDER — LOSARTAN POTASSIUM 100 MG/1
25 TABLET, FILM COATED ORAL DAILY
Qty: 0 | Refills: 0 | Status: DISCONTINUED | OUTPATIENT
Start: 2017-03-06 | End: 2017-03-11

## 2017-03-06 RX ORDER — LEVOTHYROXINE SODIUM 125 MCG
88 TABLET ORAL DAILY
Qty: 0 | Refills: 0 | Status: DISCONTINUED | OUTPATIENT
Start: 2017-03-06 | End: 2017-03-11

## 2017-03-06 RX ORDER — MEMANTINE HYDROCHLORIDE 10 MG/1
5 TABLET ORAL DAILY
Qty: 0 | Refills: 0 | Status: DISCONTINUED | OUTPATIENT
Start: 2017-03-06 | End: 2017-03-11

## 2017-03-06 RX ORDER — ACETAMINOPHEN 500 MG
650 TABLET ORAL EVERY 6 HOURS
Qty: 0 | Refills: 0 | Status: DISCONTINUED | OUTPATIENT
Start: 2017-03-06 | End: 2017-03-11

## 2017-03-06 RX ORDER — GABAPENTIN 400 MG/1
100 CAPSULE ORAL
Qty: 0 | Refills: 0 | Status: DISCONTINUED | OUTPATIENT
Start: 2017-03-06 | End: 2017-03-11

## 2017-03-06 RX ORDER — SODIUM CHLORIDE 9 MG/ML
1000 INJECTION INTRAMUSCULAR; INTRAVENOUS; SUBCUTANEOUS ONCE
Qty: 0 | Refills: 0 | Status: DISCONTINUED | OUTPATIENT
Start: 2017-03-06 | End: 2017-03-06

## 2017-03-06 RX ORDER — PANTOPRAZOLE SODIUM 20 MG/1
40 TABLET, DELAYED RELEASE ORAL EVERY 12 HOURS
Qty: 0 | Refills: 0 | Status: DISCONTINUED | OUTPATIENT
Start: 2017-03-06 | End: 2017-03-07

## 2017-03-06 RX ORDER — SODIUM CHLORIDE 9 MG/ML
3 INJECTION INTRAMUSCULAR; INTRAVENOUS; SUBCUTANEOUS ONCE
Qty: 0 | Refills: 0 | Status: COMPLETED | OUTPATIENT
Start: 2017-03-06 | End: 2017-03-06

## 2017-03-06 RX ORDER — ATENOLOL 25 MG/1
50 TABLET ORAL DAILY
Qty: 0 | Refills: 0 | Status: DISCONTINUED | OUTPATIENT
Start: 2017-03-06 | End: 2017-03-11

## 2017-03-06 RX ADMIN — PANTOPRAZOLE SODIUM 40 MILLIGRAM(S): 20 TABLET, DELAYED RELEASE ORAL at 22:29

## 2017-03-06 RX ADMIN — SODIUM CHLORIDE 3 MILLILITER(S): 9 INJECTION INTRAMUSCULAR; INTRAVENOUS; SUBCUTANEOUS at 19:53

## 2017-03-06 NOTE — ED ADULT NURSE REASSESSMENT NOTE - NS ED NURSE REASSESS COMMENT FT1
Pt resting comfortable in bed. Blood products infusing well. Pt denies pain and weakness. Pt's son left the bedside, contact information in chart.

## 2017-03-06 NOTE — H&P ADULT - NSHPPHYSICALEXAM_GEN_ALL_CORE
Vital Signs Last 24 Hrs  T(C): 37, Max: 37.1 (03-06 @ 18:53)  T(F): 98.6, Max: 98.7 (03-06 @ 18:53)  HR: 62 (60 - 77)  BP: 101/59 (99/58 - 111/63)  RR: 17 (15 - 17)  SpO2: 100% (96% - 100%)

## 2017-03-06 NOTE — H&P ADULT - HISTORY OF PRESENT ILLNESS
85 yo F            Coumadin, HTN, GERD, hypothyroidism, dementia, CAD s/p CABG, Mitral valve repair in 2007,HLD, Hx of epistaxis BIB daughter from PMD's office for worsening of generalized weakness and OROZCO.Pt was recently discharged from  on 02/17/2017 admitted for GI bleed and Epistaxis with HB:6.9, Stool guiaic +, In patient EGD was normal. On discharge 02/17/2017, H&H: 9.4/28.7. Today H&H: 7.8/25 in the ED. Pt is c/o episodes of epistaxis x 1 week at  home. Denies any f/u ENT/GI outpt f/u after discharge. Son reported that Pt restarted Coumadin 2 mg PO x 5 days and since then Epistaxis restarted.   Last colonoscopy about 1 yr ago. As per Pt's son reported as normal. 85 yo F  PMH significant for Afib on Coumadin, HTN, GERD, hypothyroidism, dementia, CAD s/p CABG, Mitral valve repair in 2007,HLD, Hx of epistaxis BIB daughter from PMD's office for worsening of generalized weakness and OROZCO.Pt was recently discharged from  on 02/17/2017,  admitted for GI bleed and Epistaxis with HB:6.9, Stool guiaic +, In patient EGD was normal. On discharge 02/17/2017, H&H: 9.4/28.7. Today H&H: 7.8/25 in the ED. Pt is c/o episodes of epistaxis x 5-6 days at  home. Denies any out pt f/u  with ENT/GI  after discharge. Son reported that Pt restarted Coumadin 2 mg PO x 5 days and since then Epistaxis restarted.     Last colonoscopy about 1 yr ago. As per Pt's son reported as normal. Pt has Hx of Dementia,  poor historian. Most of this information obtained from Son over the phone, who lives with pt and past EMR.     Pt is currently receiving 1U PRBC in the ED. Pt denies any acute c/o pt present. Denies chest pain, palpitations,  SOB oe dizziness. Pt is resting comfortably in bed. 85 yo F  PMH significant for Afib on Coumadin, HTN, GERD, hypothyroidism, dementia, CAD s/p CABG, Mitral valve repair in 2007,HLD, Hx of epistaxis BIB daughter from PMD's office for worsening of generalized weakness and OROZCO.Pt was recently discharged from  on 02/17/2017,  admitted for GI bleed and Epistaxis with HB:6.9, Stool guiaic +, In patient EGD was normal. On discharge 02/17/2017, H&H: 9.4/28.7. Today H&H: 7.8/25 in the ED. Pt is c/o episodes of epistaxis x 5-6 days at  home. Denies any out pt f/u  with ENT/GI  after discharge. Son reported that Pt restarted Coumadin 2 mg PO x 5 days and since then Epistaxis restarted.     Last colonoscopy about 1 yr ago. As per Pt's son reported as normal. Pt has Hx of Dementia,  poor historian. Most of this information obtained from Son over the phone, who lives with pt and past EMR.     Pt is currently receiving 1U PRBC in the ED. Pt denies any acute c/o pt present. Denies chest pain, palpitations,  SOB or dizziness. Denies abdominal pain, N/V/D.  Pt is resting comfortably in bed. 83 yo F  PMH significant for Afib on Coumadin, HTN, GERD, hypothyroidism, dementia, CAD s/p CABG, Mitral valve repair in 2007,HLD, Hx of epistaxis BIB daughter from PMD's office for worsening of generalized weakness and OROZCO. Pt was recently discharged from  on 02/17/2017,  admitted for GI bleed and Epistaxis with HB:6.9, Stool guiaic +, In patient EGD was normal. On discharge 02/17/2017, H&H: 9.4/28.7. Today H&H: 7.8/25 in the ED. Pt is c/o episodes of epistaxis x 5-6 days at  home. Denies any out pt f/u  with ENT/GI  after discharge. Son reported that Pt restarted Coumadin 2 mg PO x 5 days and since then Epistaxis restarted.     Last colonoscopy about 1 yr ago. As per Pt's son reported as normal. Pt has Hx of Dementia,  poor historian. Most of this information obtained from Son over the phone, who lives with pt and past EMR.     Pt is currently receiving 1U PRBC in the ED. Pt denies any acute c/o pt present. Denies chest pain, palpitations,  SOB or dizziness. Denies abdominal pain, N/V/D.  Pt is resting comfortably in bed.

## 2017-03-06 NOTE — ED ADULT NURSE NOTE - PSYCHOSOCIAL WDL
Alert and oriented x 3, normal mood and affect, no apparent risk to self or others.
financial resources

## 2017-03-06 NOTE — ED STATDOCS - PROGRESS NOTE DETAILS
Gena Singh: 83 y/o female with PMHX of anemia, afib, CAd, HLD presents to the ED brought in by daughter c/o hypotension sent in by PMD. Pt was recently admitted for anemia with unknown cause ans started her coumadin again a few days ago. Currently pt has no other complaints. Pt sent to the main for further eval and treatment.

## 2017-03-06 NOTE — ED PROVIDER NOTE - PSH
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

## 2017-03-06 NOTE — H&P ADULT - PMH
Anemia    Atrial Fibrillation  2007  on baby aspirin to maintain as per Dr Fernandez  CAD (Coronary Artery Disease) of Bypass Graft  bypass times 1,  12/2007 [facility] [place]  Cardiac Arrest  2007- lead to CABG and mitral valve repair  Cataract  implants 2007  HTN - Hypertension  1988  Hyperlipidemia    Hypothyroid  last TFT's 2 weeks ago - normal  Mitral valve repair  CAD 12/2007 placed on coumadin to remain as per Dr Fernandez  Osteoarthritis  spine, joints  Pacemaker  LACW 2010 - Medtronic ERENDIRA/ [id number] - [place] [facility]  Spinal Stenosis

## 2017-03-06 NOTE — ED PROVIDER NOTE - MEDICAL DECISION MAKING DETAILS
Elderly WF w/ general weak, OROZCO, recent anemia/GIB, ? due to epistaxis (inpt. EGD neg.) referd by PCP back to ED w/ borderline low BP, + pale skin.  Labs, IVF, serial BPs, EKG, ? PRBCs. Elderly WF w/ general weak, OROZCO, recent anemia/GIB, ? due to epistaxis (inpt. EGD neg.) referred by PCP back to ED w/ borderline low BP, + pale skin.  Labs, IVF, serial BPs, EKG, ? PRBCs.

## 2017-03-06 NOTE — H&P ADULT - NSHPREVIEWOFSYSTEMS_GEN_ALL_CORE
REVIEW OF SYSTEMS:  General: + generalized weakness  HEENT: No visual loss, blurred vision, double vision or yellow sclerae,   No hearing loss, sneezing, congestion, runny nose or sore throat.  SKIN:  No rash or itching.  CARDIOVASCULAR:  No chest pain, chest pressure or chest discomfort. No palpitations or edema.  RESPIRATORY:  No shortness of breath, cough or sputum.  GASTROINTESTINAL:  No anorexia, nausea, vomiting or diarrhea. No abdominal pain.  NEUROLOGICAL:  No headache, dizziness, syncope, paralysis, ataxia, numbness or tingling in the extremities. No change in bowel or bladder control.  MUSCULOSKELETAL:  No muscle pain, back pain, joint pain or stiffness.

## 2017-03-06 NOTE — H&P ADULT - PROBLEM SELECTOR PLAN 1
# Anemia 2/2 to GI bleed Vx Epistaxis  - Admit to Med-Surg  - H&H on admission:7.8/25  - Pt is receiving 1U PRBC in the ED  - Denies active GI bleeding today or any episodes of Epistaxis at present  - Serial H&H  - Monitor vitals  - GI consult   - Protonix IV  - Hold on Coumadin or any AC for now  - Consider inpt ENT consult if any episodes of Epistaxis while in hospital    -  - # Anemia 2/2 to GI bleed Vs Epistaxis  - Admit to Med-Surg  - H&H on admission:7.8/25  - Pt is receiving 1U PRBC in the ED  - Denies active GI bleeding today or any episodes of Epistaxis at present  - Serial H&H  - Monitor vitals  - GI consult   - Protonix IV  - Hold on Coumadin or any AC for now  - Consider inpt ENT consult if any episodes of Epistaxis while in hospital    -  - # Anemia 2/2 to GI bleed Vs Epistaxis  - Admit to Med-Surg  - H&H on admission:7.8/25  - Pt is receiving 1U PRBC in the ED  - Denies active GI bleeding today or any episodes of Epistaxis at present  - Serial H&H  - Monitor vitals  - GI consult   - Protonix IV  - Hold on Coumadin or any AC for now

## 2017-03-06 NOTE — H&P ADULT - ASSESSMENT
85 yo F  PMH significant for Afib on Coumadin, HTN, GERD, hypothyroidism, dementia, CAD s/p CABG, Mitral valve repair in 2007,HLD, Hx of epistaxis admitted for low H&H: 7.8/25, Epistaxis and generalized weakness.

## 2017-03-06 NOTE — ED PROVIDER NOTE - OBJECTIVE STATEMENT
85 y/o white F mulptiple PMHx including dementia, CAD, HLD, +pacemaker, CABG, mitral valve repair 2007, s/p cardiac arrest survivor, AF on warfarin (restarted 2 days ago), BIB daughter, ambulatory from PCP office ([facility]) regarding recurrent anemia, low bp, general weakness, fatigue, and OROZCO. Pt hospitalized mid feb, at  regarding similar complaints; +anemia w/ Hb 6.9 guaiac + stools. + PRBC transfused, inpt EGD neg, + recent epistaxis during in-pt believed possibly the source. Coumadin was stopped at that time but restarted 3 days ago. General weakness/ fatigue mod severity, progressive worsening since [facility] 2 weeks ago. No LOC, CP, nor bloody bms. PCP post d/c evaluation today; pt appeared pale SBP 90 pt referred back to  for probable transfusion and workup. PCP is Dr. Sifuentes GI is Dr. Rosario. Hb 9.4 at the time of  d/c.

## 2017-03-06 NOTE — ED PROVIDER NOTE - MUSCULOSKELETAL, MLM
Spine appears normal, range of motion is not limited, no muscle or joint tenderness +1 pedal edema pre tibial b/l, MAEx4

## 2017-03-06 NOTE — ED PROVIDER NOTE - DETAILS:
The scribe's documentation has been prepared under my direction and personally reviewed by me in its entirety. I confirm that the note above accurately reflects all work, treatment, procedures, and medical decision making performed by me (Dr. Mullins).

## 2017-03-06 NOTE — ED PROVIDER NOTE - NEUROLOGICAL, MLM
Alert and oriented, no focal deficits, no motor or sensory deficits. CN II to XII Alert and oriented, no focal deficits, no motor or sensory deficits. CN II to XII intact, normal motor, sensory and focal.

## 2017-03-06 NOTE — H&P ADULT - PROBLEM SELECTOR PLAN 3
# Hypernatremia likely 2/2 to Dehydration  - Na: 148  - Pt has Hx of Dementia, denies any headache or any acute change in mental status  - Pt received IVF  cc in the ED, hold on Lasix for now  - Monitor Na

## 2017-03-06 NOTE — H&P ADULT - PROBLEM SELECTOR PLAN 5
- Rate controlled  - c/w Atenolol with parameters  - Pt restarted Coumadin 2 mg PO daily x 5 days  - INR is therapeutic  - Hold on Coumadin for today  - Cardio consult to eval. for restarting Coumadin

## 2017-03-06 NOTE — ED PROVIDER NOTE - PMH
Anemia    Atrial Fibrillation  2007  on baby aspirin to maintain as per Dr Fernandez  CAD (Coronary Artery Disease) of Bypass Graft  bypass times 1,  12/2007 [facility] [place]  Cardiac Arrest  2007- lead to CABG and mitral valve repair  Cataract  implants 2007  HTN - Hypertension  1988  Hyperlipidemia    Hypothyroid  last TFT's 2 weeks ago - normal  Mitral valve repair  CAD 12/2007 placed on coumadin to remain as per Dr Fernandez  Osteoarthritis  spine, joints  Pacemaker  LACW 2010 - [employer] ERENDIRA/ [id number] - [facility]  Spinal Stenosis

## 2017-03-06 NOTE — ED PROVIDER NOTE - ENMT, MLM
Airway patent, Nasal mucosa clear. Mouth with normal mucosa. Throat has no vesicles, no oropharyngeal exudates and uvula is midline. Lips appear pale. No active epistaxis

## 2017-03-06 NOTE — ED PROVIDER NOTE - NS ED MD SCRIBE ATTENDING SCRIBE SECTIONS
DISPOSITION/RESULTS/HISTORY OF PRESENT ILLNESS/CONSULTATIONS/SHIFT CHANGE/PHYSICAL EXAM/INTAKE ASSESSMENT/SCREENINGS/PAST MEDICAL/SURGICAL/SOCIAL HISTORY/HIV/REVIEW OF SYSTEMS/PROGRESS NOTE

## 2017-03-06 NOTE — ED PROVIDER NOTE - CONSTITUTIONAL, MLM
normal... Elderly white female, no respi distress, non ill looking, Well appearing, well nourished, awake, alert, oriented to person, place, time/situation and in no apparent distress.

## 2017-03-06 NOTE — ED PROVIDER NOTE - CHPI ED SYMPTOMS NEG
no chills/no fever/no diarrhea/no nausea/no abdominal distension/no vomiting/no hematuria/no burning urination/no dysuria/no blood in stool

## 2017-03-06 NOTE — ED PROVIDER NOTE - CRITICAL CARE PROVIDED
PRBCs/consultation with other physicians/interpretation of diagnostic studies/documentation/consult w/ pt's family directly relating to pts condition/direct patient care (not related to procedure)

## 2017-03-06 NOTE — H&P ADULT - PSH
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

## 2017-03-06 NOTE — ED STATDOCS - PSH
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

## 2017-03-06 NOTE — H&P ADULT - PROBLEM SELECTOR PLAN 2
- Monitor H&H  - Monitor vitals  - Consider ENT consult inpt if any episode of epistaxis while in pt  - Hold on Coumadin or any AC for now - Monitor H&H  - Monitor vitals  - f/u ENT consult  - Hold on Coumadin or any AC for now

## 2017-03-06 NOTE — ED PROVIDER NOTE - EYES, MLM
Clear bilaterally, pupils equal, round and reactive to light. PERRLA EOMI conjunctiva +Pink Clear bilaterally, pupils equal, round and reactive to light. PERRLA EOMI conjunctiva +Pale

## 2017-03-06 NOTE — ED ADULT NURSE REASSESSMENT NOTE - NS ED NURSE REASSESS COMMENT FT1
pt had episode of some red blood per rectum when hemaprompt test done. dr rojas aware. blood transfusion initiated by fiorella plascencia.

## 2017-03-06 NOTE — ED PROVIDER NOTE - CARDIAC, MLM
Normal rate, regular rhythm.  Heart sounds S1, S2.  No murmurs, rubs or gallops. Normal radial pulses

## 2017-03-07 ENCOUNTER — RECORD ABSTRACTING (OUTPATIENT)
Age: 82
End: 2017-03-07

## 2017-03-07 LAB
ANION GAP SERPL CALC-SCNC: 8 MMOL/L — SIGNIFICANT CHANGE UP (ref 5–17)
BASOPHILS # BLD AUTO: 0.1 K/UL — SIGNIFICANT CHANGE UP (ref 0–0.2)
BASOPHILS NFR BLD AUTO: 1.7 % — SIGNIFICANT CHANGE UP (ref 0–2)
BUN SERPL-MCNC: 20 MG/DL — SIGNIFICANT CHANGE UP (ref 7–23)
CALCIUM SERPL-MCNC: 7.7 MG/DL — LOW (ref 8.5–10.1)
CHLORIDE SERPL-SCNC: 114 MMOL/L — HIGH (ref 96–108)
CHOLEST SERPL-MCNC: 89 MG/DL — SIGNIFICANT CHANGE UP (ref 10–199)
CO2 SERPL-SCNC: 28 MMOL/L — SIGNIFICANT CHANGE UP (ref 22–31)
CREAT SERPL-MCNC: 0.92 MG/DL — SIGNIFICANT CHANGE UP (ref 0.5–1.3)
EOSINOPHIL # BLD AUTO: 0.1 K/UL — SIGNIFICANT CHANGE UP (ref 0–0.5)
EOSINOPHIL NFR BLD AUTO: 2.2 % — SIGNIFICANT CHANGE UP (ref 0–6)
GLUCOSE SERPL-MCNC: 79 MG/DL — SIGNIFICANT CHANGE UP (ref 70–99)
HCT VFR BLD CALC: 26.1 % — LOW (ref 34.5–45)
HCT VFR BLD CALC: 26.4 % — LOW (ref 34.5–45)
HCT VFR BLD CALC: 26.8 % — LOW (ref 34.5–45)
HCT VFR BLD CALC: 27.7 % — LOW (ref 34.5–45)
HDLC SERPL-MCNC: 33 MG/DL — LOW (ref 40–125)
HGB BLD-MCNC: 8.3 G/DL — LOW (ref 11.5–15.5)
HGB BLD-MCNC: 8.4 G/DL — LOW (ref 11.5–15.5)
HGB BLD-MCNC: 8.5 G/DL — LOW (ref 11.5–15.5)
HGB BLD-MCNC: 8.6 G/DL — LOW (ref 11.5–15.5)
LIPID PNL WITH DIRECT LDL SERPL: 40 MG/DL — SIGNIFICANT CHANGE UP
LYMPHOCYTES # BLD AUTO: 1.2 K/UL — SIGNIFICANT CHANGE UP (ref 1–3.3)
LYMPHOCYTES # BLD AUTO: 19.6 % — SIGNIFICANT CHANGE UP (ref 13–44)
MCHC RBC-ENTMCNC: 30.3 PG — SIGNIFICANT CHANGE UP (ref 27–34)
MCHC RBC-ENTMCNC: 30.5 PG — SIGNIFICANT CHANGE UP (ref 27–34)
MCHC RBC-ENTMCNC: 31.4 GM/DL — LOW (ref 32–36)
MCHC RBC-ENTMCNC: 31.5 GM/DL — LOW (ref 32–36)
MCHC RBC-ENTMCNC: 31.6 PG — SIGNIFICANT CHANGE UP (ref 27–34)
MCHC RBC-ENTMCNC: 32.8 GM/DL — SIGNIFICANT CHANGE UP (ref 32–36)
MCV RBC AUTO: 96.3 FL — SIGNIFICANT CHANGE UP (ref 80–100)
MCV RBC AUTO: 96.6 FL — SIGNIFICANT CHANGE UP (ref 80–100)
MCV RBC AUTO: 96.7 FL — SIGNIFICANT CHANGE UP (ref 80–100)
MONOCYTES # BLD AUTO: 0.5 K/UL — SIGNIFICANT CHANGE UP (ref 0–0.9)
MONOCYTES NFR BLD AUTO: 8.5 % — SIGNIFICANT CHANGE UP (ref 2–14)
NEUTROPHILS # BLD AUTO: 4.1 K/UL — SIGNIFICANT CHANGE UP (ref 1.8–7.4)
NEUTROPHILS NFR BLD AUTO: 68 % — SIGNIFICANT CHANGE UP (ref 43–77)
OB PNL STL: POSITIVE
PLATELET # BLD AUTO: 131 K/UL — LOW (ref 150–400)
PLATELET # BLD AUTO: 159 K/UL — SIGNIFICANT CHANGE UP (ref 150–400)
PLATELET # BLD AUTO: 165 K/UL — SIGNIFICANT CHANGE UP (ref 150–400)
POTASSIUM SERPL-MCNC: 4.7 MMOL/L — SIGNIFICANT CHANGE UP (ref 3.5–5.3)
POTASSIUM SERPL-SCNC: 4.7 MMOL/L — SIGNIFICANT CHANGE UP (ref 3.5–5.3)
RBC # BLD: 2.71 M/UL — LOW (ref 3.8–5.2)
RBC # BLD: 2.73 M/UL — LOW (ref 3.8–5.2)
RBC # BLD: 2.77 M/UL — LOW (ref 3.8–5.2)
RBC # FLD: 19.1 % — HIGH (ref 10.3–14.5)
RBC # FLD: 19.3 % — HIGH (ref 10.3–14.5)
RBC # FLD: 19.5 % — HIGH (ref 10.3–14.5)
SODIUM SERPL-SCNC: 150 MMOL/L — HIGH (ref 135–145)
TOTAL CHOLESTEROL/HDL RATIO MEASUREMENT: 2.7 RATIO — LOW (ref 3.3–7.1)
TRIGL SERPL-MCNC: 82 MG/DL — SIGNIFICANT CHANGE UP (ref 10–149)
TSH SERPL-MCNC: 0.6 UU/ML — SIGNIFICANT CHANGE UP (ref 0.36–3.74)
WBC # BLD: 4.2 K/UL — SIGNIFICANT CHANGE UP (ref 3.8–10.5)
WBC # BLD: 4.9 K/UL — SIGNIFICANT CHANGE UP (ref 3.8–10.5)
WBC # BLD: 6 K/UL — SIGNIFICANT CHANGE UP (ref 3.8–10.5)
WBC # FLD AUTO: 4.2 K/UL — SIGNIFICANT CHANGE UP (ref 3.8–10.5)
WBC # FLD AUTO: 4.9 K/UL — SIGNIFICANT CHANGE UP (ref 3.8–10.5)
WBC # FLD AUTO: 6 K/UL — SIGNIFICANT CHANGE UP (ref 3.8–10.5)

## 2017-03-07 PROCEDURE — 74174 CTA ABD&PLVS W/CONTRAST: CPT | Mod: 26

## 2017-03-07 RX ORDER — PANTOPRAZOLE SODIUM 20 MG/1
40 TABLET, DELAYED RELEASE ORAL
Qty: 0 | Refills: 0 | Status: DISCONTINUED | OUTPATIENT
Start: 2017-03-07 | End: 2017-03-11

## 2017-03-07 RX ORDER — SODIUM CHLORIDE 9 MG/ML
1000 INJECTION, SOLUTION INTRAVENOUS
Qty: 0 | Refills: 0 | Status: DISCONTINUED | OUTPATIENT
Start: 2017-03-07 | End: 2017-03-09

## 2017-03-07 RX ADMIN — GABAPENTIN 100 MILLIGRAM(S): 400 CAPSULE ORAL at 17:34

## 2017-03-07 RX ADMIN — DONEPEZIL HYDROCHLORIDE 10 MILLIGRAM(S): 10 TABLET, FILM COATED ORAL at 00:44

## 2017-03-07 RX ADMIN — SODIUM CHLORIDE 50 MILLILITER(S): 9 INJECTION, SOLUTION INTRAVENOUS at 09:42

## 2017-03-07 RX ADMIN — PANTOPRAZOLE SODIUM 40 MILLIGRAM(S): 20 TABLET, DELAYED RELEASE ORAL at 05:39

## 2017-03-07 RX ADMIN — DONEPEZIL HYDROCHLORIDE 10 MILLIGRAM(S): 10 TABLET, FILM COATED ORAL at 21:17

## 2017-03-07 RX ADMIN — GABAPENTIN 100 MILLIGRAM(S): 400 CAPSULE ORAL at 05:40

## 2017-03-07 RX ADMIN — PANTOPRAZOLE SODIUM 40 MILLIGRAM(S): 20 TABLET, DELAYED RELEASE ORAL at 17:34

## 2017-03-07 RX ADMIN — Medication 88 MICROGRAM(S): at 05:40

## 2017-03-07 NOTE — PATIENT PROFILE ADULT. - PMH
Anemia    Atrial Fibrillation  2007  on baby aspirin to maintain as per Dr Fernandez  CAD (Coronary Artery Disease) of Bypass Graft  bypass times 1,  12/2007 [facility] [place]  Cardiac Arrest  2007- lead to CABG and mitral valve repair  Cataract  implants 2007  HTN - Hypertension  1988  Hyperlipidemia    Hypothyroid  last TFT's 2 weeks ago - normal  Mitral valve repair  CAD 12/2007 placed on coumadin to remain as per Dr Fernandez  Osteoarthritis  spine, joints  Pacemaker  LACW 2010 - [employer]tronic ERENDIRA/ [id number] - [place] [facility]  Spinal Stenosis

## 2017-03-07 NOTE — CONSULT NOTE ADULT - SUBJECTIVE AND OBJECTIVE BOX
Intermittent nosebleeds, unsure of side, no blood thinners. No bleeding for at least 2 days. Never required cauterization or packing.     nose: dry blood right side, no active bleeding  oc/op: clear

## 2017-03-07 NOTE — PATIENT PROFILE ADULT. - PSH
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

## 2017-03-07 NOTE — CONSULT NOTE ADULT - ASSESSMENT
Imp:  Anemia. Each episode associated with epistaxis which is the likely source of anemia. No evidence of GI bleed. recent EGD wnl.    Rec:  ENT eval and management

## 2017-03-07 NOTE — CONSULT NOTE ADULT - ASSESSMENT
Epistaxis on anticoagulation.  ~no need for intervention now  ~recommend start saline bid and bacitracin bid to both nares  ~aware to refrain from nose blowing

## 2017-03-07 NOTE — CONSULT NOTE ADULT - SUBJECTIVE AND OBJECTIVE BOX
HPI:  85 yo F  PMH significant for Afib on Coumadin, HTN, GERD, hypothyroidism, dementia, CAD s/p CABG, Mitral valve repair in ,HLD, Hx of epistaxis BIB daughter from PMD's office for worsening of generalized weakness and OROZCO. Pt was recently discharged from  on 2017,  admitted for GI bleed and Epistaxis with HB:6.9, Stool guiaic +, In patient EGD was normal. On discharge 2017, H&H: 9.4/28.7. Today H&H: 7.8/25 in the ED. Pt is c/o episodes of epistaxis x 5-6 days at  home. Denies any out pt f/u  with ENT/GI  after discharge. Son reported that Pt restarted Coumadin 2 mg PO x 5 days and since then Epistaxis restarted.     Patient unable to given history but per d/w nurses, no further bleeding. Patient is comfortable without specific complaints.      PAST MEDICAL & SURGICAL HISTORY:  Anemia  Cataract: implants   Cardiac Arrest: - lead to CABG and mitral valve repair  Spinal Stenosis  Osteoarthritis: spine, joints  Hypothyroid: last TFT&#x27;s 2 weeks ago - normal  CAD (Coronary Artery Disease) of Bypass Graft: bypass times 1,  2007 [facility]  Mitral valve repair: CAD 2007 placed on coumadin to remain as per Dr Fernandez  Hyperlipidemia  HTN - Hypertension:   Pacemaker: LACW  - [employer] ADDROI/ [id number] - [facility]  Atrial Fibrillation:   on baby aspirin to maintain as per Dr Fernandez  Depression: 3 years  History of Tonsillectomy: as child  Blepharoptosis:  - bilateral  partial thyroidectomy:   Breast Cyst: left - benign   hysterectomy:   Facet block: of spine   Spinal Disorder: spinal stimulator trial  - then removed  pacemaker: LACW  Medtronic ADDROI/[id number]  mitral valve repair/ CAB bypass times 1 - [facility]      MEDICATIONS  (STANDING):  losartan 25milliGRAM(s) Oral daily  gabapentin 100milliGRAM(s) Oral two times a day  escitalopram 10milliGRAM(s) Oral daily  ATENolol  Tablet 50milliGRAM(s) Oral daily  donepezil 10milliGRAM(s) Oral at bedtime  memantine 5milliGRAM(s) Oral daily  levothyroxine 88MICROGram(s) Oral daily  pantoprazole  Injectable 40milliGRAM(s) IV Push every 12 hours  dextrose 5%. 1000milliLiter(s) IV Continuous <Continuous>    MEDICATIONS  (PRN):  acetaminophen   Tablet 650milliGRAM(s) Oral every 6 hours PRN pain and fever  aluminum hydroxide/magnesium hydroxide/simethicone Suspension 30milliLiter(s) Oral every 4 hours PRN Dyspepsia      Allergies    amoxicillin (Rash)  Daypro (Other)  Keflex (Rash)  Tikosyn (Unknown)      FAMILY HISTORY:  No pertinent family history in first degree relatives      As above  Otherwise unremarkable    Vital Signs Last 24 Hrs  T(C): 37, Max: 37.1 ( @ 18:53)  T(F): 98.6, Max: 98.8 ( @ 00:27)  HR: 66 (60 - 77)  BP: 110/59 (93/49 - 120/57)  BP(mean): --  RR: 16 (15 - 17)  SpO2: 100% (96% - 100%)    Constitutional: NAD, well-developed, very pleasant  Respiratory: CTAB  Cardiovascular: S1 and S2, RRR  Gastrointestinal: BS+, soft, NT/ND  Extremities: No peripheral edema  Psychiatric: Normal mood, normal affect  Skin: No rashes    LABS:                        8.4    6.0   )-----------( 159      ( 07 Mar 2017 06:49 )             26.8     07 Mar 2017 06:49    150    |  114    |  20     ----------------------------<  79     4.7     |  28     |  0.92     Ca    7.7        07 Mar 2017 06:49    TPro  5.6    /  Alb  2.6    /  TBili  0.4    /  DBili  x      /  AST  25     /  ALT  11     /  AlkPhos  106    06 Mar 2017 15:48    PT/INR - ( 06 Mar 2017 15:48 )   PT: 23.0 sec;   INR: 2.08 ratio         PTT - ( 06 Mar 2017 15:48 )  PTT:36.8 sec  LIVER FUNCTIONS - ( 06 Mar 2017 15:48 )  Alb: 2.6 g/dL / Pro: 5.6 gm/dL / ALK PHOS: 106 U/L / ALT: 11 U/L / AST: 25 U/L / GGT: x

## 2017-03-08 LAB
HCT VFR BLD CALC: 27.2 % — LOW (ref 34.5–45)
HCT VFR BLD CALC: 29.6 % — LOW (ref 34.5–45)
HGB BLD-MCNC: 8.5 G/DL — LOW (ref 11.5–15.5)
HGB BLD-MCNC: 9 G/DL — LOW (ref 11.5–15.5)
INR BLD: 2.08 RATIO — HIGH (ref 0.88–1.16)
MCHC RBC-ENTMCNC: 29.6 PG — SIGNIFICANT CHANGE UP (ref 27–34)
MCHC RBC-ENTMCNC: 29.9 PG — SIGNIFICANT CHANGE UP (ref 27–34)
MCHC RBC-ENTMCNC: 30.4 GM/DL — LOW (ref 32–36)
MCHC RBC-ENTMCNC: 31.2 GM/DL — LOW (ref 32–36)
MCV RBC AUTO: 95.9 FL — SIGNIFICANT CHANGE UP (ref 80–100)
MCV RBC AUTO: 97.6 FL — SIGNIFICANT CHANGE UP (ref 80–100)
PLATELET # BLD AUTO: 180 K/UL — SIGNIFICANT CHANGE UP (ref 150–400)
PLATELET # BLD AUTO: 185 K/UL — SIGNIFICANT CHANGE UP (ref 150–400)
PROTHROM AB SERPL-ACNC: 23.1 SEC — HIGH (ref 10–13.1)
RBC # BLD: 2.84 M/UL — LOW (ref 3.8–5.2)
RBC # BLD: 3.03 M/UL — LOW (ref 3.8–5.2)
RBC # FLD: 18.5 % — HIGH (ref 10.3–14.5)
RBC # FLD: 19.4 % — HIGH (ref 10.3–14.5)
WBC # BLD: 5.5 K/UL — SIGNIFICANT CHANGE UP (ref 3.8–10.5)
WBC # BLD: 6.1 K/UL — SIGNIFICANT CHANGE UP (ref 3.8–10.5)
WBC # FLD AUTO: 5.5 K/UL — SIGNIFICANT CHANGE UP (ref 3.8–10.5)
WBC # FLD AUTO: 6.1 K/UL — SIGNIFICANT CHANGE UP (ref 3.8–10.5)

## 2017-03-08 RX ADMIN — MEMANTINE HYDROCHLORIDE 5 MILLIGRAM(S): 10 TABLET ORAL at 11:15

## 2017-03-08 RX ADMIN — PANTOPRAZOLE SODIUM 40 MILLIGRAM(S): 20 TABLET, DELAYED RELEASE ORAL at 11:16

## 2017-03-08 RX ADMIN — ESCITALOPRAM OXALATE 10 MILLIGRAM(S): 10 TABLET, FILM COATED ORAL at 11:15

## 2017-03-08 RX ADMIN — DONEPEZIL HYDROCHLORIDE 10 MILLIGRAM(S): 10 TABLET, FILM COATED ORAL at 21:34

## 2017-03-08 RX ADMIN — LOSARTAN POTASSIUM 25 MILLIGRAM(S): 100 TABLET, FILM COATED ORAL at 05:13

## 2017-03-08 RX ADMIN — ATENOLOL 50 MILLIGRAM(S): 25 TABLET ORAL at 05:13

## 2017-03-08 RX ADMIN — Medication 88 MICROGRAM(S): at 05:13

## 2017-03-08 RX ADMIN — GABAPENTIN 100 MILLIGRAM(S): 400 CAPSULE ORAL at 05:13

## 2017-03-08 RX ADMIN — GABAPENTIN 100 MILLIGRAM(S): 400 CAPSULE ORAL at 18:56

## 2017-03-08 RX ADMIN — SODIUM CHLORIDE 50 MILLILITER(S): 9 INJECTION, SOLUTION INTRAVENOUS at 11:15

## 2017-03-08 NOTE — PROGRESS NOTE ADULT - ASSESSMENT
Imp:  Source of bleeding remains unclear to me -- nose bleeds is confounding factor and reportedly marroon stools but rectal stool is melanic. Recent EGD neg.    Rec:  Cont to observe for bleeding  For further bleeding evidence, would pursue bleeding scan  Consider permanent d/c of coumadin

## 2017-03-08 NOTE — CDI QUERY NOTE - NSCDIOTHERTXTBX_GEN_ALL_CORE_HH
Anemia due to blood loss documented.  h/h on admit= 7.8/25.0. 1 unit prbc given in ER  Patient with epistaxis.    Please clarify the acuity of the Anemia due to blood loss.  ie.. Acute blood loss anemia due to epistaxis ?  ..... Acute oc chronic blood loss anemia ?  ..... Chronic anemia ?   ..... Other ? please clarify

## 2017-03-09 LAB
HCT VFR BLD CALC: 27.5 % — LOW (ref 34.5–45)
HCT VFR BLD CALC: 27.7 % — LOW (ref 34.5–45)
HGB BLD-MCNC: 8.5 G/DL — LOW (ref 11.5–15.5)
HGB BLD-MCNC: 8.8 G/DL — LOW (ref 11.5–15.5)
INR BLD: 1.9 RATIO — HIGH (ref 0.88–1.16)
MCHC RBC-ENTMCNC: 30.1 PG — SIGNIFICANT CHANGE UP (ref 27–34)
MCHC RBC-ENTMCNC: 30.9 GM/DL — LOW (ref 32–36)
MCHC RBC-ENTMCNC: 31 PG — SIGNIFICANT CHANGE UP (ref 27–34)
MCHC RBC-ENTMCNC: 31.8 GM/DL — LOW (ref 32–36)
MCV RBC AUTO: 97.3 FL — SIGNIFICANT CHANGE UP (ref 80–100)
MCV RBC AUTO: 97.4 FL — SIGNIFICANT CHANGE UP (ref 80–100)
PLATELET # BLD AUTO: 195 K/UL — SIGNIFICANT CHANGE UP (ref 150–400)
PLATELET # BLD AUTO: 203 K/UL — SIGNIFICANT CHANGE UP (ref 150–400)
PROTHROM AB SERPL-ACNC: 21 SEC — HIGH (ref 10–13.1)
RBC # BLD: 2.82 M/UL — LOW (ref 3.8–5.2)
RBC # BLD: 2.84 M/UL — LOW (ref 3.8–5.2)
RBC # FLD: 18.6 % — HIGH (ref 10.3–14.5)
RBC # FLD: 18.6 % — HIGH (ref 10.3–14.5)
WBC # BLD: 6.7 K/UL — SIGNIFICANT CHANGE UP (ref 3.8–10.5)
WBC # BLD: 7.6 K/UL — SIGNIFICANT CHANGE UP (ref 3.8–10.5)
WBC # FLD AUTO: 6.7 K/UL — SIGNIFICANT CHANGE UP (ref 3.8–10.5)
WBC # FLD AUTO: 7.6 K/UL — SIGNIFICANT CHANGE UP (ref 3.8–10.5)

## 2017-03-09 RX ORDER — BACITRACIN ZINC 500 UNIT/G
1 OINTMENT IN PACKET (EA) TOPICAL
Qty: 0 | Refills: 0 | Status: DISCONTINUED | OUTPATIENT
Start: 2017-03-09 | End: 2017-03-11

## 2017-03-09 RX ORDER — SODIUM CHLORIDE 0.65 %
1 AEROSOL, SPRAY (ML) NASAL
Qty: 0 | Refills: 0 | Status: DISCONTINUED | OUTPATIENT
Start: 2017-03-09 | End: 2017-03-11

## 2017-03-09 RX ORDER — PHYTONADIONE (VIT K1) 5 MG
5 TABLET ORAL ONCE
Qty: 0 | Refills: 0 | Status: COMPLETED | OUTPATIENT
Start: 2017-03-09 | End: 2017-03-09

## 2017-03-09 RX ORDER — VANCOMYCIN HCL 1 G
125 VIAL (EA) INTRAVENOUS ONCE
Qty: 0 | Refills: 0 | Status: COMPLETED | OUTPATIENT
Start: 2017-03-09 | End: 2017-03-09

## 2017-03-09 RX ORDER — METRONIDAZOLE 500 MG
250 TABLET ORAL ONCE
Qty: 0 | Refills: 0 | Status: DISCONTINUED | OUTPATIENT
Start: 2017-03-09 | End: 2017-03-10

## 2017-03-09 RX ADMIN — Medication 5 MILLIGRAM(S): at 13:27

## 2017-03-09 RX ADMIN — ESCITALOPRAM OXALATE 10 MILLIGRAM(S): 10 TABLET, FILM COATED ORAL at 13:24

## 2017-03-09 RX ADMIN — GABAPENTIN 100 MILLIGRAM(S): 400 CAPSULE ORAL at 06:07

## 2017-03-09 RX ADMIN — MEMANTINE HYDROCHLORIDE 5 MILLIGRAM(S): 10 TABLET ORAL at 13:24

## 2017-03-09 RX ADMIN — Medication 88 MICROGRAM(S): at 06:07

## 2017-03-09 RX ADMIN — Medication 1 APPLICATION(S): at 18:25

## 2017-03-09 RX ADMIN — LOSARTAN POTASSIUM 25 MILLIGRAM(S): 100 TABLET, FILM COATED ORAL at 06:08

## 2017-03-09 RX ADMIN — DONEPEZIL HYDROCHLORIDE 10 MILLIGRAM(S): 10 TABLET, FILM COATED ORAL at 22:02

## 2017-03-09 RX ADMIN — Medication 650 MILLIGRAM(S): at 23:01

## 2017-03-09 RX ADMIN — Medication 1 SPRAY(S): at 18:26

## 2017-03-09 RX ADMIN — SODIUM CHLORIDE 50 MILLILITER(S): 9 INJECTION, SOLUTION INTRAVENOUS at 06:14

## 2017-03-09 RX ADMIN — GABAPENTIN 100 MILLIGRAM(S): 400 CAPSULE ORAL at 18:25

## 2017-03-09 RX ADMIN — PANTOPRAZOLE SODIUM 40 MILLIGRAM(S): 20 TABLET, DELAYED RELEASE ORAL at 06:11

## 2017-03-09 RX ADMIN — ATENOLOL 50 MILLIGRAM(S): 25 TABLET ORAL at 06:08

## 2017-03-09 NOTE — PROGRESS NOTE ADULT - ASSESSMENT
Imp:  S/P bleeding on anticoagulation -- still unclear how much was from nose vs GI tract.    Rec:  Cont to observe  May be reasonable to hold anticoag for now, consider risks and benefits of resuming at later time

## 2017-03-09 NOTE — PROVIDER CONTACT NOTE (OTHER) - SITUATION
to cuca. for recurrent epistaxis  spoke to 's Service
To eval. for Drop in H&H, r/o GI bleed  answering service aware of consult
called id the service will pass the message
to eval. for recurrent epistaxis  answering service made aware of consult

## 2017-03-09 NOTE — PHYSICAL THERAPY INITIAL EVALUATION ADULT - ADDITIONAL COMMENTS
Has RW, shower chair, commode chair.  Needs a tub transfer chair.  3 steps to enter the home, basement addis-son performs.

## 2017-03-10 LAB
ALBUMIN SERPL ELPH-MCNC: 2.4 G/DL — LOW (ref 3.3–5)
ANION GAP SERPL CALC-SCNC: 8 MMOL/L — SIGNIFICANT CHANGE UP (ref 5–17)
BUN SERPL-MCNC: 5 MG/DL — LOW (ref 7–23)
C DIFF BY PCR RESULT: DETECTED
C DIFF TOX GENS STL QL NAA+PROBE: SIGNIFICANT CHANGE UP
CALCIUM SERPL-MCNC: 7.7 MG/DL — LOW (ref 8.5–10.1)
CHLORIDE SERPL-SCNC: 110 MMOL/L — HIGH (ref 96–108)
CO2 SERPL-SCNC: 26 MMOL/L — SIGNIFICANT CHANGE UP (ref 22–31)
CREAT SERPL-MCNC: 0.75 MG/DL — SIGNIFICANT CHANGE UP (ref 0.5–1.3)
GLUCOSE SERPL-MCNC: 104 MG/DL — HIGH (ref 70–99)
HCT VFR BLD CALC: 25.4 % — LOW (ref 34.5–45)
HCT VFR BLD CALC: 29.6 % — LOW (ref 34.5–45)
HGB BLD-MCNC: 8.4 G/DL — LOW (ref 11.5–15.5)
HGB BLD-MCNC: 9.2 G/DL — LOW (ref 11.5–15.5)
INR BLD: 1.56 RATIO — HIGH (ref 0.88–1.16)
MCHC RBC-ENTMCNC: 30.4 PG — SIGNIFICANT CHANGE UP (ref 27–34)
MCHC RBC-ENTMCNC: 30.9 GM/DL — LOW (ref 32–36)
MCHC RBC-ENTMCNC: 32.5 PG — SIGNIFICANT CHANGE UP (ref 27–34)
MCHC RBC-ENTMCNC: 33.2 GM/DL — SIGNIFICANT CHANGE UP (ref 32–36)
MCV RBC AUTO: 97.7 FL — SIGNIFICANT CHANGE UP (ref 80–100)
MCV RBC AUTO: 98.5 FL — SIGNIFICANT CHANGE UP (ref 80–100)
PHOSPHATE SERPL-MCNC: 3 MG/DL — SIGNIFICANT CHANGE UP (ref 2.5–4.5)
PLATELET # BLD AUTO: 190 K/UL — SIGNIFICANT CHANGE UP (ref 150–400)
PLATELET # BLD AUTO: 248 K/UL — SIGNIFICANT CHANGE UP (ref 150–400)
POTASSIUM SERPL-MCNC: 4 MMOL/L — SIGNIFICANT CHANGE UP (ref 3.5–5.3)
POTASSIUM SERPL-SCNC: 4 MMOL/L — SIGNIFICANT CHANGE UP (ref 3.5–5.3)
PROTHROM AB SERPL-ACNC: 17.2 SEC — HIGH (ref 10–13.1)
RBC # BLD: 2.6 M/UL — LOW (ref 3.8–5.2)
RBC # BLD: 3.01 M/UL — LOW (ref 3.8–5.2)
RBC # FLD: 19.1 % — HIGH (ref 10.3–14.5)
RBC # FLD: 19.4 % — HIGH (ref 10.3–14.5)
SODIUM SERPL-SCNC: 144 MMOL/L — SIGNIFICANT CHANGE UP (ref 135–145)
WBC # BLD: 6.7 K/UL — SIGNIFICANT CHANGE UP (ref 3.8–10.5)
WBC # BLD: 9.1 K/UL — SIGNIFICANT CHANGE UP (ref 3.8–10.5)
WBC # FLD AUTO: 6.7 K/UL — SIGNIFICANT CHANGE UP (ref 3.8–10.5)
WBC # FLD AUTO: 9.1 K/UL — SIGNIFICANT CHANGE UP (ref 3.8–10.5)

## 2017-03-10 RX ADMIN — MEMANTINE HYDROCHLORIDE 5 MILLIGRAM(S): 10 TABLET ORAL at 11:54

## 2017-03-10 RX ADMIN — Medication 88 MICROGRAM(S): at 05:28

## 2017-03-10 RX ADMIN — DONEPEZIL HYDROCHLORIDE 10 MILLIGRAM(S): 10 TABLET, FILM COATED ORAL at 21:19

## 2017-03-10 RX ADMIN — Medication 1 APPLICATION(S): at 05:34

## 2017-03-10 RX ADMIN — LOSARTAN POTASSIUM 25 MILLIGRAM(S): 100 TABLET, FILM COATED ORAL at 05:28

## 2017-03-10 RX ADMIN — ATENOLOL 50 MILLIGRAM(S): 25 TABLET ORAL at 05:28

## 2017-03-10 RX ADMIN — Medication 1 SPRAY(S): at 06:31

## 2017-03-10 RX ADMIN — GABAPENTIN 100 MILLIGRAM(S): 400 CAPSULE ORAL at 18:53

## 2017-03-10 RX ADMIN — Medication 650 MILLIGRAM(S): at 20:43

## 2017-03-10 RX ADMIN — Medication 1 APPLICATION(S): at 18:54

## 2017-03-10 RX ADMIN — GABAPENTIN 100 MILLIGRAM(S): 400 CAPSULE ORAL at 05:27

## 2017-03-10 RX ADMIN — PANTOPRAZOLE SODIUM 40 MILLIGRAM(S): 20 TABLET, DELAYED RELEASE ORAL at 06:31

## 2017-03-10 RX ADMIN — ESCITALOPRAM OXALATE 10 MILLIGRAM(S): 10 TABLET, FILM COATED ORAL at 11:54

## 2017-03-10 NOTE — CONSULT NOTE ADULT - ASSESSMENT
83 yo F  PMH significant for Afib on Coumadin, HTN, GERD, hypothyroidism, dementia, CAD s/p CABG, Mitral valve repair in 2007,HLD, Hx of epistaxis BIB daughter from PMD's office for worsening of generalized weakness and OROZCO. Pt was recently discharged from  on 02/17/2017,  admitted for GI bleed and Epistaxis with HB:6.9, Stool guiaic +, EGD was normal. Pt was resumed on coumadin, pt now admitted 3/6 with episodes of epistaxis x 5-6 days at home with gi bleeding, stool guaiac +, during hospital stay, anticoagulation held, epistaxis/gi bleeding resolved, course now c/b multiple epsiodes of diarrhea, has had about 3 episodes of loose, liquid stools and 1 this am. No fevers, no abd pain, was given 1 dose of oral vanco.    1. diarrhea/epistaxis/gi bleed    - epistaxis/gi bleed d/t anticoagulation - resolved    - multiple episodes of loose stools    - no recent abx use    - check C diff PCR/isolation precautions    - if positive, start oral vancomycin [id number]     - f/u CBC    - monitor temps    - supportive care    2. other issues - Afib on Coumadin, HTN, GERD, hypothyroidism, dementia, CAD s/p CABG, Mitral valve repair in 2007,HLD, Hx of epistaxis - care per medicine

## 2017-03-10 NOTE — CONSULT NOTE ADULT - SUBJECTIVE AND OBJECTIVE BOX
Patient is a 84y old  Female who presents with a chief complaint of c/o nose bleed and weakness (06 Mar 2017 20:04)      HPI:  83 yo F  PMH significant for Afib on Coumadin, HTN, GERD, hypothyroidism, dementia, CAD s/p CABG, Mitral valve repair in 2007,HLD, Hx of epistaxis BIB daughter from PMD's office for worsening of generalized weakness and OROZCO. Pt was recently discharged from  on 02/17/2017,  admitted for GI bleed and Epistaxis with HB:6.9, Stool guiaic +, EGD was normal. Pt was resumed on coumadin, pt now admitted 3/6 with episodes of epistaxis x 5-6 days at home with gi bleeding, stool guaiac +, during hospital stay, anticoagulation held, epistaxis/gi bleeding resolved, course now c/b multiple epsiodes of diarrhea, has had about 3 episodes of loose, liquid stools and 1 this am. No fevers, no abd pain, was given 1 dose of oral vanco.        PMH: as above    PSH: as above    Meds: per reconciliation sheet, noted below    MEDICATIONS  (STANDING):  losartan 25milliGRAM(s) Oral daily  gabapentin 100milliGRAM(s) Oral two times a day  escitalopram 10milliGRAM(s) Oral daily  ATENolol  Tablet 50milliGRAM(s) Oral daily  donepezil 10milliGRAM(s) Oral at bedtime  memantine 5milliGRAM(s) Oral daily  levothyroxine 88MICROGram(s) Oral daily  pantoprazole    Tablet 40milliGRAM(s) Oral before breakfast  BACItracin   Ointment 1Application(s) Topical two times a day  sodium chloride 0.65% Nasal 1Spray(s) Both Nostrils two times a day      Allergies    amoxicillin (Rash)  Daypro (Other)  Keflex (Rash)  Tikosyn (Unknown)    Intolerances        Social: no smoking, no alcohol, no illegal drugs; no recent travel, no exposure to TB    Family history:  No pertinent family history in first degree relatives      ROS: the patient denies fever, no chills, no HA, no dizziness, no sore throat, no blurry vision, no CP, no palpitations, no SOB, no cough, no abdominal pain, no N/V, no dysuria, no leg pain, no claudication, no rash, no joint aches, no rectal pain, no night sweats    Vital Signs Last 24 Hrs  T(C): 36.8, Max: 36.8 (03-10 @ 05:36)  T(F): 98.2, Max: 98.2 (03-10 @ 05:36)  HR: 64 (62 - 64)  BP: 108/62 (108/62 - 130/70)  BP(mean): --  RR: 18 (18 - 18)  SpO2: 100% (100% - 100%)      PE:  Constitutional: NAD, laying in bed  HEENT: NC/AT, EOMI, PERRLA  Neck: supple  Back: no tenderness  Respiratory: clear  Cardiovascular: S1S2 regular, no murmurs  Abdomen: soft, not tender, not distended, positive BS  Genitourinary: deferred  Rectal: deferred  Musculoskeletal: no muscle tenderness, no joint swelling or tenderness  Extremities: no pedal edema  Neurological: AxOx3, moving all extremities, no focal deficits  Skin: no rashes    Labs:                        8.8    7.6   )-----------( 195      ( 09 Mar 2017 22:07 )             27.5                      Radiology:    Advanced directives addressed: full resuscitation

## 2017-03-11 VITALS — WEIGHT: 145.95 LBS

## 2017-03-11 LAB
ALBUMIN SERPL ELPH-MCNC: 2.4 G/DL — LOW (ref 3.3–5)
ANION GAP SERPL CALC-SCNC: 8 MMOL/L — SIGNIFICANT CHANGE UP (ref 5–17)
BUN SERPL-MCNC: 6 MG/DL — LOW (ref 7–23)
CALCIUM SERPL-MCNC: 7.7 MG/DL — LOW (ref 8.5–10.1)
CHLORIDE SERPL-SCNC: 110 MMOL/L — HIGH (ref 96–108)
CO2 SERPL-SCNC: 25 MMOL/L — SIGNIFICANT CHANGE UP (ref 22–31)
CREAT SERPL-MCNC: 0.77 MG/DL — SIGNIFICANT CHANGE UP (ref 0.5–1.3)
CULTURE RESULTS: SIGNIFICANT CHANGE UP
GLUCOSE SERPL-MCNC: 85 MG/DL — SIGNIFICANT CHANGE UP (ref 70–99)
INR BLD: 1.29 RATIO — HIGH (ref 0.88–1.16)
PHOSPHATE SERPL-MCNC: 3.3 MG/DL — SIGNIFICANT CHANGE UP (ref 2.5–4.5)
POTASSIUM SERPL-MCNC: 4 MMOL/L — SIGNIFICANT CHANGE UP (ref 3.5–5.3)
POTASSIUM SERPL-SCNC: 4 MMOL/L — SIGNIFICANT CHANGE UP (ref 3.5–5.3)
PROTHROM AB SERPL-ACNC: 14.2 SEC — HIGH (ref 10–13.1)
SODIUM SERPL-SCNC: 143 MMOL/L — SIGNIFICANT CHANGE UP (ref 135–145)
SPECIMEN SOURCE: SIGNIFICANT CHANGE UP

## 2017-03-11 RX ORDER — LOSARTAN POTASSIUM 100 MG/1
25 TABLET, FILM COATED ORAL
Qty: 0 | Refills: 0 | COMMUNITY

## 2017-03-11 RX ORDER — LOSARTAN POTASSIUM 100 MG/1
1 TABLET, FILM COATED ORAL
Qty: 0 | Refills: 0 | COMMUNITY
Start: 2017-03-11

## 2017-03-11 RX ORDER — VANCOMYCIN HCL 1 G
5 VIAL (EA) INTRAVENOUS
Qty: 200 | Refills: 0 | OUTPATIENT
Start: 2017-03-11 | End: 2017-03-21

## 2017-03-11 RX ORDER — VANCOMYCIN HCL 1 G
125 VIAL (EA) INTRAVENOUS EVERY 6 HOURS
Qty: 0 | Refills: 0 | Status: DISCONTINUED | OUTPATIENT
Start: 2017-03-11 | End: 2017-03-11

## 2017-03-11 RX ADMIN — Medication 650 MILLIGRAM(S): at 09:13

## 2017-03-11 RX ADMIN — Medication 1 APPLICATION(S): at 05:15

## 2017-03-11 RX ADMIN — PANTOPRAZOLE SODIUM 40 MILLIGRAM(S): 20 TABLET, DELAYED RELEASE ORAL at 09:13

## 2017-03-11 RX ADMIN — Medication 88 MICROGRAM(S): at 05:15

## 2017-03-11 RX ADMIN — Medication 1 SPRAY(S): at 05:16

## 2017-03-11 RX ADMIN — GABAPENTIN 100 MILLIGRAM(S): 400 CAPSULE ORAL at 05:15

## 2017-03-11 RX ADMIN — MEMANTINE HYDROCHLORIDE 5 MILLIGRAM(S): 10 TABLET ORAL at 12:21

## 2017-03-11 RX ADMIN — Medication 125 MILLIGRAM(S): at 12:20

## 2017-03-11 RX ADMIN — ESCITALOPRAM OXALATE 10 MILLIGRAM(S): 10 TABLET, FILM COATED ORAL at 12:21

## 2017-03-11 NOTE — DISCHARGE NOTE ADULT - MEDICATION SUMMARY - MEDICATIONS TO TAKE
I will START or STAY ON the medications listed below when I get home from the hospital:    oxyCODONE  --  by mouth   -- Indication: For Home meds    losartan 25 mg oral tablet  -- 1 tab(s) by mouth once a day  -- Indication: For Home meds    gabapentin 100 mg oral capsule  -- 1 cap(s) by mouth 2 times a day  -- Indication: For Home meds    escitalopram 10 mg oral tablet  -- 1 tab(s) by mouth once a day  -- Indication: For Home meds    ALPRAZolam 0.25 mg oral tablet  -- 1 tab(s) by mouth once a day (at bedtime)  -- Indication: For Home meds    atenolol  -- 50 milligram(s) by mouth once a day  -- Indication: For Home meds    Aricept  -- Indication: For Home meds    donepezil 10 mg oral tablet  -- 1 tab(s) by mouth once a day (at bedtime)  -- Indication: For Home meds    Lasix  -- 20 milligram(s) by mouth once a day  -- Indication: For Home meds    vancomycin 250 mg/5 mL oral solution  -- 5 milliliter(s) by mouth every 6 hours  -- Indication: For C diff     memantine 5 mg oral tablet  -- 1 tab(s) by mouth once a day  -- Indication: For Home meds    Protonix  -- 40 milligram(s) by mouth once a day  -- Indication: For Home meds    Synthroid  -- 88 microgram(s) by mouth once a day  -- Indication: For Home meds

## 2017-03-11 NOTE — DISCHARGE NOTE ADULT - HOSPITAL COURSE
HPI:  85 yo F  PMH significant for Afib on Coumadin, HTN, GERD, hypothyroidism, dementia, CAD s/p CABG, Mitral valve repair in 2007,HLD, Hx of epistaxis admitted  from PMD's office for worsening of generalized weakness and OROZCO. Pt was recently discharged from  on 02/17/2017,  admitted for GI bleed and Epistaxis with HB:6.9, Stool guiaic +, In patient EGD was normal. On discharge 02/17/2017, H&H: 9.4/28.7. Today H&H: 7.8/25 in the ED. Pt is c/o episodes of epistaxis x 5-6 days at  home. Denies any out pt f/u  with ENT/GI  after discharge    #Review of system- rest of review of systems are negative except as mentioned in HPI  PHYSICAL EXAM:  Vital  Signs Last 24 Hrs  T(C): 36.9, Max: 36.9 (03-11 @ 05:12)  T(F): 98.4, Max: 98.4 (03-11 @ 05:12)  HR: 61 (61 - 72)  BP: 100/69 (100/69 - 125/47)  BP(mean): --  RR: 17 (17 - 18)  SpO2: 100% (100% - 100%)    GENERAL: comfortable   HEAD:  Atraumatic, Normocephalic  EYES: EOMI, PERRLA, conjunctiva and sclera clear  HEENT: Moist mucous membranes  NECK: Supple, No JVD  NERVOUS SYSTEM:  Non focal, alert, Motor Strength 5/5 B/L upper and lower extremities; DTRs 2+ intact and symmetric  CHEST/LUNG: Clear to auscultation bilaterally; No rales, rhonchi, wheezing, or rubs  HEART:S1S2 normal, no murmer  ABDOMEN: Soft, Nontender, Nondistended; Bowel sounds present  GENITOURINARY- Voiding, no palpable bladder  EXTREMITIES:  2+ Peripheral Pulses, No clubbing, cyanosis, or edema  MUSCULOSKELTAL- No muscle tenderness, Muscle tone normal, No joint tenderness, no Joint swelling, Joint range of motion-normal  SKIN-no rash, no lesion  PSYCH- Mood stable  LYMPH Node- No palpable lymph node    LABS:                      8.4    9.1   )-----------( 190      ( 10 Mar 2017 22:20 )             25.4     11 Mar 2017 07:49    143    |  110    |  6      ----------------------------<  85     4.0     |  25     |  0.77     Ca    7.7        11 Mar 2017 07:49  Phos  3.3       11 Mar 2017 07:49    Standing medicine  losartan 25milliGRAM(s) Oral daily  gabapentin 100milliGRAM(s) Oral two times a day  escitalopram 10milliGRAM(s) Oral daily  ATENolol  Tablet 50milliGRAM(s) Oral daily  donepezil 10milliGRAM(s) Oral at bedtime  memantine 5milliGRAM(s) Oral daily  levothyroxine 88MICROGram(s) Oral daily  acetaminophen   Tablet 650milliGRAM(s) Oral every 6 hours PRN  aluminum hydroxide/magnesium hydroxide/simethicone Suspension 30milliLiter(s) Oral every 4 hours PRN  pantoprazole    Tablet 40milliGRAM(s) Oral before breakfast  BACItracin   Ointment 1Application(s) Topical two times a day  sodium chloride 0.65% Nasal 1Spray(s) Both Nostrils two times a day  vancomycin    Solution 125milliGRAM(s) Oral every 6 hours    A/P    #Diarrhea -due to C diff infection  -resolved   -pt is feeling better. Discussed with daughter Ms. Gaona([phone]) about plan. Since her diarrhea has improved, she is being discharged on po vancomycin  -daughter agrees with plan    #Episode of epistaxis  -monitor h/h. No longer epistaxis   -ENT evaluation appreciated   -bactrum and saline nasal spray bid here during hospitalization. Further management as an outpt now      #Acute blood loss anemia due to possibly combination of gi bleed and epistaxis   -stable h/h   -had episode of bloody bowel movement here during hospitalization but CT did not show any active bleeding. No episode since last 36 hours     #CAD- home meds     #Afib   -no AC   -ct BB  -I have discussed side effect of coumadin which can lead to again episode of gi bleed/epistaxis with daughter and pt. Also discussed with about risk of not giving AC which can leads to stroke and its consequences which can lead to paralysis aphasia etc. After discussion daughter and pt agreed to have anticoagulation started back on her. Further management as an outpt    #d/c today with further management as an outpt     #time spent more than 30 minutes

## 2017-03-11 NOTE — PROGRESS NOTE ADULT - SUBJECTIVE AND OBJECTIVE BOX
83 yo F  PMH significant for Afib on Coumadin, HTN, GERD, hypothyroidism, dementia, CAD s/p CABG, Mitral valve repair in 2007,HLD, Hx of epistaxis admitted from PMD's office for worsening of generalized weakness and OROZCO. Pt was recently discharged from  after she was managed for epistaxis and questionable gi bleeed. During that admission underwent EGD did not show any acute pathology. Her coumadin was stopped at that time which was resumed recently and this episode started       #Review of system- rest of review of systems are negative except as mentioned in HPI    PHYSICAL EXAM:  Vital Signs Last 24 Hrs  T(C): 36.9, Max: 37.1 (03-08 @ 21:06)  T(F): 98.5, Max: 98.7 (03-08 @ 21:06)  HR: 60 (59 - 65)  BP: 135/64 (103/56 - 135/64)  BP(mean): --  RR: 17 (16 - 17)  SpO2: 100% (97% - 100%)    GENERAL: comfortable   HEAD:  Atraumatic, Normocephalic  EYES: EOMI, PERRLA, conjunctiva and sclera clear  HEENT: Moist mucous membranes  NECK: Supple, No JVD  NERVOUS SYSTEM:  Non focal, alert, follows command  CHEST/LUNG: Clear to auscultation bilaterally; No rales, rhonchi, wheezing, or rubs  HEART: Regular rate and rhythm; No murmurs, rubs, or gallops  ABDOMEN: Soft, Nontender, Nondistended; Bowel sounds present  GENITOURINARY- Voiding, no palpable bladder  EXTREMITIES:  2+ Peripheral Pulses, No clubbing, cyanosis, or edema  MUSCULOSKELETAL No muscle tenderness, Muscle tone normal, No joint tenderness, no Joint swelling, Joint range of motion-normal  SKIN-no rash, no lesion    LABS:                                   8.5    6.1   )-----------( 185      ( 08 Mar 2017 21:54 )             27.2   PT/INR - ( 09 Mar 2017 06:16 )   PT: 21.0 sec;   INR: 1.90 ratio          A/P    #Episode of epistaxis  -monitor h/h. No longer epistaxis   -ENT evaluation appreciated   -bactrum and saline nasal spray bid     #Acute blood loss anemia due to possibly combination of gi bleed and epistaxis   -ct to monitor and transfuse prn   -had episode of bloody bowel movement here during hospitalization but CT did not show any active bleeding, if bleeding happens again then bleeding scan     #CAD- home meds     #Afib   -hold off on AC   -vitamin K to reverse INR   -ct BB  -might need to be away from AC. Risk of bleeding will be high    #continue to monitor for today, if no further drop in h/h then d/c tomorrow possibly     #dvt pr- scd
HPI:  85 yo F  PMH significant for Afib on Coumadin, HTN, GERD, hypothyroidism, dementia, CAD s/p CABG, Mitral valve repair in 2007,HLD, Hx of epistaxis admitted from  PMD's office for worsening of generalized weakness and OROZCO. Pt was recently discharged from  on 02/17/2017,  admitted for GI bleed and Epistaxis with HB:6.9, Stool guiaic +, In patient EGD was normal. On discharge 02/17/2017, H&H: 9.4/28.7. Today H&H: 7.8/25 in the ED. Pt is c/o episodes of epistaxis x 5-6 days at  home. Denies any out pt f/u  with ENT/GI  after discharge. Son reported that Pt restarted Coumadin 2 mg PO x 5 days and since then Epistaxis restarted.     #Review of system- rest of review of systems are negative except as mentioned in HPI    PHYSICAL EXAM:    Vital Signs Last 24 Hrs  T(C): 36.9, Max: 36.9 (03-11 @ 05:12)  T(F): 98.4, Max: 98.4 (03-11 @ 05:12)  HR: 61 (61 - 78)  BP: 100/69 (100/59 - 125/47)  BP(mean): --  RR: 17 (17 - 18)  SpO2: 100% (100% - 100%)    GENERAL: comfortable   HEAD:  Atraumatic, Normocephalic  EYES: EOMI, PERRLA, conjunctiva and sclera clear  HEENT: Moist mucous membranes  NECK: Supple, No JVD  NERVOUS SYSTEM:  Alert, Motor Strength 5/5 B/L upper and lower extremities; DTRs 2+ intact and symmetric  CHEST/LUNG: Clear to auscultation bilaterally; No rales, rhonchi, wheezing, or rubs  HEART:S1S2 normal, no murmer  ABDOMEN: Soft, Nontender, Nondistended; Bowel sounds present  GENITOURINARY- Voiding, no palpable bladder  EXTREMITIES:  2+ Peripheral Pulses, No clubbing, cyanosis, or edema  MUSCULOSKELTAL- No muscle tenderness, Muscle tone normal, No joint tenderness, no Joint swelling, Joint range of motion-normal  SKIN-no rash, no lesion  PSYCH- Mood stable  LYMPH Node- No palpable lymph node    LABS:                        8.4    9.1   )-----------( 190      ( 10 Mar 2017 22:20 )             25.4     10 Mar 2017 11:25    144    |  110    |  5      ----------------------------<  104    4.0     |  26     |  0.75     Ca    7.7        10 Mar 2017 11:25  Phos  3.0       10 Mar 2017 11:25    TPro  x      /  Alb  2.4    /  TBili  x      /  DBili  x      /  AST  x      /  ALT  x      /  AlkPhos  x      10 Mar 2017 11:25    PT/INR - ( 10 Mar 2017 06:41 )   PT: 17.2 sec;   INR: 1.56 ratio          Standing medicine  losartan 25milliGRAM(s) Oral daily  gabapentin 100milliGRAM(s) Oral two times a day  escitalopram 10milliGRAM(s) Oral daily  ATENolol  Tablet 50milliGRAM(s) Oral daily  donepezil 10milliGRAM(s) Oral at bedtime  memantine 5milliGRAM(s) Oral daily  levothyroxine 88MICROGram(s) Oral daily  acetaminophen   Tablet 650milliGRAM(s) Oral every 6 hours PRN  aluminum hydroxide/magnesium hydroxide/simethicone Suspension 30milliLiter(s) Oral every 4 hours PRN  pantoprazole    Tablet 40milliGRAM(s) Oral before breakfast  BACItracin   Ointment 1Application(s) Topical two times a day  sodium chloride 0.65% Nasal 1Spray(s) Both Nostrils two times a day  vancomycin    Solution 125milliGRAM(s) Oral every 6 hours        A/P      #Diarrhea episode  -C diff positive   -start po vancomycin   -monitor her for electrolytes imbalence     #Episode of epistaxis  -monitor h/h. No longer epistaxis   -ENT evaluation appreciated   -bactrum and saline nasal spray bid     #Acute blood loss anemia due to possibly combination of gi bleed and epistaxis   -ct to monitor and transfuse prn   -had episode of bloody bowel movement here during hospitalization but CT did not show any active bleeding, if bleeding happens again then bleeding scan     #CAD- home meds     #Afib   -no AC   -ct BB  -might need to be away from AC. Risk of bleeding will be high    #dvt pr- scd
83 yo F  PMH significant for Afib on Coumadin, HTN, GERD, hypothyroidism, dementia, CAD s/p CABG, Mitral valve repair in 2007,HLD, Hx of epistaxis admitted from PMD's office for worsening of generalized weakness and OROZCO. Pt was recently discharged from  after she was managed for epistaxis and questionable gi bleeed. During that admission underwent EGD did not show any acute pathology. Her coumadin was stopped at that time which was resumed recently and this episode started       #Review of system- rest of review of systems are negative except as mentioned in HPI    PHYSICAL EXAM:  Vital Signs Last 24 Hrs  T(C): 36.9, Max: 36.9 (03-07 @ 16:25)  T(F): 98.5, Max: 98.5 (03-07 @ 16:25)  HR: 59 (59 - 60)  BP: 125/60 (103/60 - 125/60)  BP(mean): --  RR: 17 (17 - 17)  SpO2: 100% (100% - 100%)  GENERAL: comfortable   HEAD:  Atraumatic, Normocephalic  EYES: EOMI, PERRLA, conjunctiva and sclera clear  HEENT: Moist mucous membranes  NECK: Supple, No JVD  NERVOUS SYSTEM:  Non focal, alert, follows command  CHEST/LUNG: Clear to auscultation bilaterally; No rales, rhonchi, wheezing, or rubs  HEART: Regular rate and rhythm; No murmurs, rubs, or gallops  ABDOMEN: Soft, Nontender, Nondistended; Bowel sounds present  GENITOURINARY- Voiding, no palpable bladder  EXTREMITIES:  2+ Peripheral Pulses, No clubbing, cyanosis, or edema  MUSCULOSKELETAL No muscle tenderness, Muscle tone normal, No joint tenderness, no Joint swelling, Joint range of motion-normal  SKIN-no rash, no lesion    LABS:                        8.4    6.0   )-----------( 159      ( 07 Mar 2017 06:49 )             26.8     07 Mar 2017 06:49    150    |  114    |  20     ----------------------------<  79     4.7     |  28     |  0.92     Ca    7.7        07 Mar 2017 06:49    TPro  5.6    /  Alb  2.6    /  TBili  0.4    /  DBili  x      /  AST  25     /  ALT  11     /  AlkPhos  106    06 Mar 2017 15:48    PT/INR - ( 06 Mar 2017 15:48 )   PT: 23.0 sec;   INR: 2.08 ratio        Standing medicine  losartan 25milliGRAM(s) Oral daily  gabapentin 100milliGRAM(s) Oral two times a day  escitalopram 10milliGRAM(s) Oral daily  ATENolol  Tablet 50milliGRAM(s) Oral daily  donepezil 10milliGRAM(s) Oral at bedtime  memantine 5milliGRAM(s) Oral daily  levothyroxine 88MICROGram(s) Oral daily  acetaminophen   Tablet 650milliGRAM(s) Oral every 6 hours PRN  aluminum hydroxide/magnesium hydroxide/simethicone Suspension 30milliLiter(s) Oral every 4 hours PRN  dextrose 5%. 1000milliLiter(s) IV Continuous <Continuous>  pantoprazole    Tablet 40milliGRAM(s) Oral before breakfast        A/P    #Episode of epistaxis  -monitor h/h. No longer epistaxis     #Acute blood loss anemia due to possibly combination of gi bleed and epistaxis   -ct to monitor and transfuse prn   -had episode of bloody bowel movement did not show any active bleeding, if bleeding happens again then bleeding scan     #CAD- home meds     #Afib   -hold off on AC   -ct BB  -might need to be away from AC. Risk of bleeding will be high    #dvt pr-
84y old  Female who presents with a chief complaint of c/o nose bleed and weakness (06 Mar 2017 20:04)    HPI:    85 yo F  PMH significant for Afib on Coumadin, HTN, GERD, hypothyroidism, dementia, CAD s/p CABG, Mitral valve repair in 2007,HLD, Hx of epistaxis admitted from PMD's office for worsening of generalized weakness and OROZCO. Pt was recently discharged from  after she was managed for epistaxis and questionable gi bleeed. During that admission underwent EGD did not show any acute pathology. Her coumadin was stopped at that time which was resumed recently and this episode started       #Review of system- rest of review of systems are negative except as mentioned in HPI    PHYSICAL EXAM:  Vital Signs Last 24 Hrs  T(C): 37, Max: 37.1 (03-06 @ 18:53)  T(F): 98.6, Max: 98.8 (03-07 @ 00:27)  HR: 66 (60 - 77)  BP: 110/59 (93/49 - 120/57)  BP(mean): --  RR: 16 (15 - 17)  SpO2: 100% (96% - 100%)  GENERAL: comfortable   HEAD:  Atraumatic, Normocephalic  EYES: EOMI, PERRLA, conjunctiva and sclera clear  HEENT: Moist mucous membranes  NECK: Supple, No JVD  NERVOUS SYSTEM:  Non focal, alert, follows command  CHEST/LUNG: Clear to auscultation bilaterally; No rales, rhonchi, wheezing, or rubs  HEART: Regular rate and rhythm; No murmurs, rubs, or gallops  ABDOMEN: Soft, Nontender, Nondistended; Bowel sounds present  GENITOURINARY- Voiding, no palpable bladder  EXTREMITIES:  2+ Peripheral Pulses, No clubbing, cyanosis, or edema  MUSCULOSKELETAL No muscle tenderness, Muscle tone normal, No joint tenderness, no Joint swelling, Joint range of motion-normal  SKIN-no rash, no lesion    LABS:                        8.4    6.0   )-----------( 159      ( 07 Mar 2017 06:49 )             26.8     07 Mar 2017 06:49    150    |  114    |  20     ----------------------------<  79     4.7     |  28     |  0.92     Ca    7.7        07 Mar 2017 06:49    TPro  5.6    /  Alb  2.6    /  TBili  0.4    /  DBili  x      /  AST  25     /  ALT  11     /  AlkPhos  106    06 Mar 2017 15:48    PT/INR - ( 06 Mar 2017 15:48 )   PT: 23.0 sec;   INR: 2.08 ratio        Standing medicine  losartan 25milliGRAM(s) Oral daily  gabapentin 100milliGRAM(s) Oral two times a day  escitalopram 10milliGRAM(s) Oral daily  ATENolol  Tablet 50milliGRAM(s) Oral daily  donepezil 10milliGRAM(s) Oral at bedtime  memantine 5milliGRAM(s) Oral daily  levothyroxine 88MICROGram(s) Oral daily  acetaminophen   Tablet 650milliGRAM(s) Oral every 6 hours PRN  aluminum hydroxide/magnesium hydroxide/simethicone Suspension 30milliLiter(s) Oral every 4 hours PRN  dextrose 5%. 1000milliLiter(s) IV Continuous <Continuous>  pantoprazole    Tablet 40milliGRAM(s) Oral before breakfast        A/P    #Episode of epistaxis  -monitor h/h, ENT evaluation, does not appears like GI bleed. Most likely epistaxis/blood she is swallowing  -not a candidate for ongoing anticoagulation   -start diet   -change iv ppi to po     #CAD- home meds     #Afib   -hold off on AC   -ct BB    #dvt pr- now has therapeutic inr
HPI:  83 yo F  PMH significant for Afib on Coumadin, HTN, GERD, hypothyroidism, dementia, CAD s/p CABG, Mitral valve repair in 2007,HLD, Hx of epistaxis BIB daughter from PMD's office for worsening of generalized weakness and OROZCO. Pt was recently discharged from  on 02/17/2017,  admitted for GI bleed and Epistaxis with HB:6.9, Stool guiaic +, In patient EGD was normal. On discharge 02/17/2017, H&H: 9.4/28.7. Today H&H: 7.8/25 in the ED. Pt is c/o episodes of epistaxis x 5-6 days at  home. Denies any out pt f/u  with ENT/GI  after discharge. Son reported that Pt restarted Coumadin 2 mg PO x 5 days and since then Epistaxis restarted.     #Review of system- rest of review of systems are negative except as mentioned in HPI    PHYSICAL EXAM:    Vital Signs Last 24 Hrs  T(C): 36.8, Max: 36.8 (03-10 @ 05:36)  T(F): 98.2, Max: 98.2 (03-10 @ 05:36)  HR: 78 (62 - 78)  BP: 100/59 (100/59 - 130/70)  BP(mean): --  RR: 18 (18 - 18)  SpO2: 100% (100% - 100%)    GENERAL: comfortable   HEAD:  Atraumatic, Normocephalic  EYES: EOMI, PERRLA, conjunctiva and sclera clear  HEENT: Moist mucous membranes  NECK: Supple, No JVD  NERVOUS SYSTEM: non focal Motor Strength 5/5 B/L upper and lower extremities; DTRs 2+ intact and symmetric  CHEST/LUNG: Clear to auscultation bilaterally; No rales, rhonchi, wheezing, or rubs  HEART:S1S2 normal, no murmer  ABDOMEN: Soft, Nontender, Nondistended; Bowel sounds present  GENITOURINARY- Voiding, no palpable bladder  EXTREMITIES:  2+ Peripheral Pulses, No clubbing, cyanosis, or edema  MUSCULOSKELETAL No muscle tenderness, Muscle tone normal, No joint tenderness, no Joint swelling, Joint range of motion-normal  SKIN-no rash, no lesion  PSYCH- Mood stable  LYMPH Node- No palpable lymph node    LABS:                        9.2    6.7   )-----------( 248      ( 10 Mar 2017 11:26 )             29.6     10 Mar 2017 11:25    144    |  110    |  5      ----------------------------<  104    4.0     |  26     |  0.75     Ca    7.7        10 Mar 2017 11:25  Phos  3.0       10 Mar 2017 11:25    TPro  x      /  Alb  2.4    /  TBili  x      /  DBili  x      /  AST  x      /  ALT  x      /  AlkPhos  x      10 Mar 2017 11:25    PT/INR - ( 10 Mar 2017 06:41 )   PT: 17.2 sec;   INR: 1.56 ratio         Standing medicine  losartan 25milliGRAM(s) Oral daily  gabapentin 100milliGRAM(s) Oral two times a day  escitalopram 10milliGRAM(s) Oral daily  ATENolol  Tablet 50milliGRAM(s) Oral daily  donepezil 10milliGRAM(s) Oral at bedtime  memantine 5milliGRAM(s) Oral daily  levothyroxine 88MICROGram(s) Oral daily  acetaminophen   Tablet 650milliGRAM(s) Oral every 6 hours PRN  aluminum hydroxide/magnesium hydroxide/simethicone Suspension 30milliLiter(s) Oral every 4 hours PRN  pantoprazole    Tablet 40milliGRAM(s) Oral before breakfast  BACItracin   Ointment 1Application(s) Topical two times a day  sodium chloride 0.65% Nasal 1Spray(s) Both Nostrils two times a day    A/P    #Diarrhea episode  -C diff to follow   -ct to monitor her     #Episode of epistaxis  -monitor h/h. No longer epistaxis   -ENT evaluation appreciated   -bactrum and saline nasal spray bid     #Acute blood loss anemia due to possibly combination of gi bleed and epistaxis   -ct to monitor and transfuse prn   -had episode of bloody bowel movement here during hospitalization but CT did not show any active bleeding, if bleeding happens again then bleeding scan     #CAD- home meds     #Afib   -hold off on AC   -ct BB  -might need to be away from AC. Risk of bleeding will be high    #dvt pr- scd
Patient is a 84y old  Female who presents with a chief complaint of c/o nose bleed and weakness (06 Mar 2017 20:04)      Subective:  Had multiple marroon stools in last 24 hours but CTA abdomen neg for bleed and H/H. Stable    PAST MEDICAL & SURGICAL HISTORY:  Anemia  Cataract: implants   Cardiac Arrest: - lead to CABG and mitral valve repair  Spinal Stenosis  Osteoarthritis: spine, joints  Hypothyroid: last TFT&#x27;s 2 weeks ago - normal  CAD (Coronary Artery Disease) of Bypass Graft: bypass times 1,  2007 [facility]  Mitral valve repair: CAD 2007 placed on coumadin to remain as per Dr Fernandez  Hyperlipidemia  HTN - Hypertension:   Pacemaker: LACW  - Medtronic ADDROI/ [id number] - [facility]  Atrial Fibrillation:   on baby aspirin to maintain as per Dr Fernandez  Depression: 3 years  History of Tonsillectomy: as child  Blepharoptosis:  - bilateral  partial thyroidectomy:   Breast Cyst: left - benign   hysterectomy:   Facet block: of spine   Spinal Disorder: spinal stimulator trial  - then removed  pacemaker: LACW  Medtronic ADDROI/[id number]  mitral valve repair/ CAB bypass times 1 - [facility]      MEDICATIONS  (STANDING):  losartan 25milliGRAM(s) Oral daily  gabapentin 100milliGRAM(s) Oral two times a day  escitalopram 10milliGRAM(s) Oral daily  ATENolol  Tablet 50milliGRAM(s) Oral daily  donepezil 10milliGRAM(s) Oral at bedtime  memantine 5milliGRAM(s) Oral daily  levothyroxine 88MICROGram(s) Oral daily  dextrose 5%. 1000milliLiter(s) IV Continuous <Continuous>  pantoprazole    Tablet 40milliGRAM(s) Oral before breakfast    MEDICATIONS  (PRN):  acetaminophen   Tablet 650milliGRAM(s) Oral every 6 hours PRN pain and fever  aluminum hydroxide/magnesium hydroxide/simethicone Suspension 30milliLiter(s) Oral every 4 hours PRN Dyspepsia      REVIEW OF SYSTEMS:    RESPIRATORY: No shortness of breath  CARDIOVASCULAR: No chest pain  All other review of systems is negative unless indicated above.    Vital Signs Last 24 Hrs  T(C): 36.9, Max: 36.9 ( @ 16:25)  T(F): 98.5, Max: 98.5 ( @ 16:25)  HR: 59 (59 - 60)  BP: 125/60 (103/60 - 125/60)  BP(mean): --  RR: 17 (17 - 17)  SpO2: 100% (100% - 100%)    PHYSICAL EXAM:    Constitutional: NAD, well-developed  Respiratory: CTAB  Cardiovascular: S1 and S2, RRR  Gastrointestinal: BS+, soft, NT/ND  Extremities: No peripheral edema  Psychiatric: Normal mood, normal affect  RECTAL: Melena    LABS:                        8.3    4.9   )-----------( 165      ( 07 Mar 2017 22:31 )             26.4     07 Mar 2017 06:49    150    |  114    |  20     ----------------------------<  79     4.7     |  28     |  0.92     Ca    7.7        07 Mar 2017 06:49    TPro  5.6    /  Alb  2.6    /  TBili  0.4    /  DBili  x      /  AST  25     /  ALT  11     /  AlkPhos  106    06 Mar 2017 15:48    PT/INR - ( 08 Mar 2017 06:38 )   PT: 23.1 sec;   INR: 2.08 ratio         PTT - ( 06 Mar 2017 15:48 )  PTT:36.8 sec  LIVER FUNCTIONS - ( 06 Mar 2017 15:48 )  Alb: 2.6 g/dL / Pro: 5.6 gm/dL / ALK PHOS: 106 U/L / ALT: 11 U/L / AST: 25 U/L / GGT: x
Patient is a 84y old  Female who presents with a chief complaint of c/o nose bleed and weakness (06 Mar 2017 20:04)      Subective:  No further significant BPR  H/H stable  Patient feels good    PAST MEDICAL & SURGICAL HISTORY:  Anemia  Cataract: implants   Cardiac Arrest: - lead to CABG and mitral valve repair  Spinal Stenosis  Osteoarthritis: spine, joints  Hypothyroid: last TFT&#x27;s 2 weeks ago - normal  CAD (Coronary Artery Disease) of Bypass Graft: bypass times 1,  2007 [facility]  Mitral valve repair: CAD 2007 placed on coumadin to remain as per Dr Fernandez  Hyperlipidemia  HTN - Hypertension:   Pacemaker: LACW  - Medtronic ADDROI/ [id number] - [place] [facility]  Atrial Fibrillation:   on baby aspirin to maintain as per Dr Fernandez  Depression: 3 years  History of Tonsillectomy: as child  Blepharoptosis:  - bilateral  partial thyroidectomy:   Breast Cyst: left - benign   hysterectomy:   Facet block: of spine   Spinal Disorder: spinal stimulator trial  - then removed  pacemaker: LACW  Medtronic ADDROI/[id number]  mitral valve repair/ CAB bypass times 1 - [facility]      MEDICATIONS  (STANDING):  losartan 25milliGRAM(s) Oral daily  gabapentin 100milliGRAM(s) Oral two times a day  escitalopram 10milliGRAM(s) Oral daily  ATENolol  Tablet 50milliGRAM(s) Oral daily  donepezil 10milliGRAM(s) Oral at bedtime  memantine 5milliGRAM(s) Oral daily  levothyroxine 88MICROGram(s) Oral daily  dextrose 5%. 1000milliLiter(s) IV Continuous <Continuous>  pantoprazole    Tablet 40milliGRAM(s) Oral before breakfast    MEDICATIONS  (PRN):  acetaminophen   Tablet 650milliGRAM(s) Oral every 6 hours PRN pain and fever  aluminum hydroxide/magnesium hydroxide/simethicone Suspension 30milliLiter(s) Oral every 4 hours PRN Dyspepsia      REVIEW OF SYSTEMS:    RESPIRATORY: No shortness of breath  CARDIOVASCULAR: No chest pain  All other review of systems is negative unless indicated above.    Vital Signs Last 24 Hrs  T(C): 36.9, Max: 37.1 (03-08 @ 21:06)  T(F): 98.5, Max: 98.7 (03-08 @ 21:06)  HR: 60 (59 - 65)  BP: 135/64 (103/56 - 135/64)  BP(mean): --  RR: 17 (16 - 17)  SpO2: 100% (97% - 100%)    PHYSICAL EXAM:    Constitutional: NAD, well-developed  Respiratory: CTAB  Cardiovascular: S1 and S2,  Gastrointestinal: BS+, soft, NT/ND  Extremities: No peripheral edema  Psychiatric: Normal mood, normal affect    LABS:                        8.5    6.1   )-----------( 185      ( 08 Mar 2017 21:54 )             27.2           PT/INR - ( 09 Mar 2017 06:16 )   PT: 21.0 sec;   INR: 1.90 ratio               RADIOLOGY & ADDITIONAL STUDIES:

## 2017-03-11 NOTE — DISCHARGE NOTE ADULT - CARE PROVIDERS DIRECT ADDRESSES
,DirectAddress_Unknown,[email or web address],DirectAddress_Unknown

## 2017-03-11 NOTE — DISCHARGE NOTE ADULT - PATIENT PORTAL LINK FT
“You can access the [facility] Patient Portal, offered by [facility], by registering with the following website: [email or web address]”

## 2017-03-11 NOTE — DISCHARGE NOTE ADULT - CARE PLAN
Principal Discharge DX:	Anemia due to blood loss  Goal:	take your medicine, follow up in pmd, gi office for ongoing management  Instructions for follow-up, activity and diet:	as above

## 2017-03-11 NOTE — DISCHARGE NOTE ADULT - COMMUNITY RESOURCES
Pt being d.c home with home care services through [facility].  Son is transporting pt home.

## 2017-03-11 NOTE — DISCHARGE NOTE ADULT - CARE PROVIDER_API CALL
Boubacar Rosario), Gastroenterology; Internal Medicine  [address] [place] [address]  Phone: [phone]  Fax: [phone]    Keyla Sifuentes), Internal Medicine  [address] [place] [address]  Phone: [phone]  Fax: [phone]

## 2017-03-12 LAB
CULTURE RESULTS: SIGNIFICANT CHANGE UP
SPECIMEN SOURCE: SIGNIFICANT CHANGE UP

## 2017-03-14 DIAGNOSIS — Z88.1 ALLERGY STATUS TO OTHER ANTIBIOTIC AGENTS STATUS: ICD-10-CM

## 2017-03-14 DIAGNOSIS — M19.90 UNSPECIFIED OSTEOARTHRITIS, UNSPECIFIED SITE: ICD-10-CM

## 2017-03-14 DIAGNOSIS — R04.0 EPISTAXIS: ICD-10-CM

## 2017-03-14 DIAGNOSIS — Z79.01 LONG TERM (CURRENT) USE OF ANTICOAGULANTS: ICD-10-CM

## 2017-03-14 DIAGNOSIS — M48.00 SPINAL STENOSIS, SITE UNSPECIFIED: ICD-10-CM

## 2017-03-14 DIAGNOSIS — Z98.890 OTHER SPECIFIED POSTPROCEDURAL STATES: ICD-10-CM

## 2017-03-14 DIAGNOSIS — Z95.0 PRESENCE OF CARDIAC PACEMAKER: ICD-10-CM

## 2017-03-14 DIAGNOSIS — I10 ESSENTIAL (PRIMARY) HYPERTENSION: ICD-10-CM

## 2017-03-14 DIAGNOSIS — B96.89 OTHER SPECIFIED BACTERIAL AGENTS AS THE CAUSE OF DISEASES CLASSIFIED ELSEWHERE: ICD-10-CM

## 2017-03-14 DIAGNOSIS — E87.0 HYPEROSMOLALITY AND HYPERNATREMIA: ICD-10-CM

## 2017-03-14 DIAGNOSIS — E78.5 HYPERLIPIDEMIA, UNSPECIFIED: ICD-10-CM

## 2017-03-14 DIAGNOSIS — F03.90 UNSPECIFIED DEMENTIA, UNSPECIFIED SEVERITY, WITHOUT BEHAVIORAL DISTURBANCE, PSYCHOTIC DISTURBANCE, MOOD DISTURBANCE, AND ANXIETY: ICD-10-CM

## 2017-03-14 DIAGNOSIS — Z86.74 PERSONAL HISTORY OF SUDDEN CARDIAC ARREST: ICD-10-CM

## 2017-03-14 DIAGNOSIS — I95.9 HYPOTENSION, UNSPECIFIED: ICD-10-CM

## 2017-03-14 DIAGNOSIS — I48.91 UNSPECIFIED ATRIAL FIBRILLATION: ICD-10-CM

## 2017-03-14 DIAGNOSIS — I25.10 ATHEROSCLEROTIC HEART DISEASE OF NATIVE CORONARY ARTERY WITHOUT ANGINA PECTORIS: ICD-10-CM

## 2017-03-14 DIAGNOSIS — E86.0 DEHYDRATION: ICD-10-CM

## 2017-03-14 DIAGNOSIS — D64.9 ANEMIA, UNSPECIFIED: ICD-10-CM

## 2017-03-14 DIAGNOSIS — Z88.8 ALLERGY STATUS TO OTHER DRUGS, MEDICAMENTS AND BIOLOGICAL SUBSTANCES STATUS: ICD-10-CM

## 2017-03-14 DIAGNOSIS — E03.9 HYPOTHYROIDISM, UNSPECIFIED: ICD-10-CM

## 2017-03-14 DIAGNOSIS — K21.9 GASTRO-ESOPHAGEAL REFLUX DISEASE WITHOUT ESOPHAGITIS: ICD-10-CM

## 2017-03-14 DIAGNOSIS — Z95.1 PRESENCE OF AORTOCORONARY BYPASS GRAFT: ICD-10-CM

## 2017-03-14 DIAGNOSIS — R19.7 DIARRHEA, UNSPECIFIED: ICD-10-CM

## 2017-03-14 DIAGNOSIS — D50.0 IRON DEFICIENCY ANEMIA SECONDARY TO BLOOD LOSS (CHRONIC): ICD-10-CM

## 2017-03-14 DIAGNOSIS — T45.515A ADVERSE EFFECT OF ANTICOAGULANTS, INITIAL ENCOUNTER: ICD-10-CM

## 2017-03-27 ENCOUNTER — INPATIENT (INPATIENT)
Facility: HOSPITAL | Age: 82
LOS: 1 days | Discharge: HOME HEALTH | End: 2017-03-29
Attending: INTERNAL MEDICINE | Admitting: FAMILY MEDICINE
Payer: MEDICARE

## 2017-03-27 VITALS — HEIGHT: 70 IN | WEIGHT: 145.06 LBS

## 2017-03-27 LAB
ALBUMIN SERPL ELPH-MCNC: 2.6 G/DL — LOW (ref 3.3–5)
ALP SERPL-CCNC: 103 U/L — SIGNIFICANT CHANGE UP (ref 40–120)
ALT FLD-CCNC: 12 U/L — SIGNIFICANT CHANGE UP (ref 12–78)
ANION GAP SERPL CALC-SCNC: 7 MMOL/L — SIGNIFICANT CHANGE UP (ref 5–17)
ANISOCYTOSIS BLD QL: SLIGHT — SIGNIFICANT CHANGE UP
APTT BLD: 33 SEC — SIGNIFICANT CHANGE UP (ref 27.5–37.4)
AST SERPL-CCNC: 15 U/L — SIGNIFICANT CHANGE UP (ref 15–37)
BASOPHILS # BLD AUTO: 0.1 K/UL — SIGNIFICANT CHANGE UP (ref 0–0.2)
BASOPHILS NFR BLD AUTO: 2 % — SIGNIFICANT CHANGE UP (ref 0–2)
BILIRUB SERPL-MCNC: 0.5 MG/DL — SIGNIFICANT CHANGE UP (ref 0.2–1.2)
BLD GP AB SCN SERPL QL: SIGNIFICANT CHANGE UP
BUN SERPL-MCNC: 14 MG/DL — SIGNIFICANT CHANGE UP (ref 7–23)
CALCIUM SERPL-MCNC: 7.9 MG/DL — LOW (ref 8.5–10.1)
CHLORIDE SERPL-SCNC: 109 MMOL/L — HIGH (ref 96–108)
CO2 SERPL-SCNC: 28 MMOL/L — SIGNIFICANT CHANGE UP (ref 22–31)
CREAT SERPL-MCNC: 1.01 MG/DL — SIGNIFICANT CHANGE UP (ref 0.5–1.3)
ELLIPTOCYTES BLD QL SMEAR: SLIGHT — SIGNIFICANT CHANGE UP
EOSINOPHIL # BLD AUTO: 0.2 K/UL — SIGNIFICANT CHANGE UP (ref 0–0.5)
EOSINOPHIL NFR BLD AUTO: 2.6 % — SIGNIFICANT CHANGE UP (ref 0–6)
GLUCOSE SERPL-MCNC: 84 MG/DL — SIGNIFICANT CHANGE UP (ref 70–99)
HCT VFR BLD CALC: 26.7 % — LOW (ref 34.5–45)
HGB BLD-MCNC: 8.4 G/DL — LOW (ref 11.5–15.5)
HYPOCHROMIA BLD QL: SLIGHT — SIGNIFICANT CHANGE UP
INR BLD: 1.16 RATIO — SIGNIFICANT CHANGE UP (ref 0.88–1.16)
LG PLATELETS BLD QL AUTO: SLIGHT — SIGNIFICANT CHANGE UP
LYMPHOCYTES # BLD AUTO: 1.4 K/UL — SIGNIFICANT CHANGE UP (ref 1–3.3)
LYMPHOCYTES # BLD AUTO: 22.4 % — SIGNIFICANT CHANGE UP (ref 13–44)
MACROCYTES BLD QL: SLIGHT — SIGNIFICANT CHANGE UP
MANUAL DIF COMMENT BLD-IMP: SIGNIFICANT CHANGE UP
MCHC RBC-ENTMCNC: 31.3 PG — SIGNIFICANT CHANGE UP (ref 27–34)
MCHC RBC-ENTMCNC: 31.5 GM/DL — LOW (ref 32–36)
MCV RBC AUTO: 99.4 FL — SIGNIFICANT CHANGE UP (ref 80–100)
MICROCYTES BLD QL: SLIGHT — SIGNIFICANT CHANGE UP
MONOCYTES # BLD AUTO: 0.5 K/UL — SIGNIFICANT CHANGE UP (ref 0–0.9)
MONOCYTES NFR BLD AUTO: 7.2 % — SIGNIFICANT CHANGE UP (ref 2–14)
NEUTROPHILS # BLD AUTO: 4.1 K/UL — SIGNIFICANT CHANGE UP (ref 1.8–7.4)
NEUTROPHILS NFR BLD AUTO: 65.8 % — SIGNIFICANT CHANGE UP (ref 43–77)
OVALOCYTES BLD QL SMEAR: SLIGHT — SIGNIFICANT CHANGE UP
PLAT MORPH BLD: NORMAL — SIGNIFICANT CHANGE UP
PLATELET # BLD AUTO: 212 K/UL — SIGNIFICANT CHANGE UP (ref 150–400)
POIKILOCYTOSIS BLD QL AUTO: SLIGHT — SIGNIFICANT CHANGE UP
POLYCHROMASIA BLD QL SMEAR: SLIGHT — SIGNIFICANT CHANGE UP
POTASSIUM SERPL-MCNC: 4.2 MMOL/L — SIGNIFICANT CHANGE UP (ref 3.5–5.3)
POTASSIUM SERPL-SCNC: 4.2 MMOL/L — SIGNIFICANT CHANGE UP (ref 3.5–5.3)
PROT SERPL-MCNC: 5.6 GM/DL — LOW (ref 6–8.3)
PROTHROM AB SERPL-ACNC: 12.6 SEC — SIGNIFICANT CHANGE UP (ref 9.8–12.7)
RBC # BLD: 2.68 M/UL — LOW (ref 3.8–5.2)
RBC # FLD: 17.8 % — HIGH (ref 10.3–14.5)
RBC BLD AUTO: (no result)
SODIUM SERPL-SCNC: 144 MMOL/L — SIGNIFICANT CHANGE UP (ref 135–145)
TYPE + AB SCN PNL BLD: SIGNIFICANT CHANGE UP
WBC # BLD: 6.3 K/UL — SIGNIFICANT CHANGE UP (ref 3.8–10.5)
WBC # FLD AUTO: 6.3 K/UL — SIGNIFICANT CHANGE UP (ref 3.8–10.5)

## 2017-03-27 PROCEDURE — 99285 EMERGENCY DEPT VISIT HI MDM: CPT

## 2017-03-27 PROCEDURE — 93010 ELECTROCARDIOGRAM REPORT: CPT

## 2017-03-27 PROCEDURE — 71010: CPT | Mod: 26

## 2017-03-27 RX ORDER — ACETAMINOPHEN 500 MG
650 TABLET ORAL EVERY 6 HOURS
Qty: 0 | Refills: 0 | Status: DISCONTINUED | OUTPATIENT
Start: 2017-03-27 | End: 2017-03-29

## 2017-03-27 RX ORDER — LEVOTHYROXINE SODIUM 125 MCG
88 TABLET ORAL DAILY
Qty: 0 | Refills: 0 | Status: DISCONTINUED | OUTPATIENT
Start: 2017-03-27 | End: 2017-03-29

## 2017-03-27 RX ORDER — DOCUSATE SODIUM 100 MG
100 CAPSULE ORAL DAILY
Qty: 0 | Refills: 0 | Status: DISCONTINUED | OUTPATIENT
Start: 2017-03-27 | End: 2017-03-29

## 2017-03-27 RX ORDER — SODIUM CHLORIDE 9 MG/ML
3 INJECTION INTRAMUSCULAR; INTRAVENOUS; SUBCUTANEOUS ONCE
Qty: 0 | Refills: 0 | Status: COMPLETED | OUTPATIENT
Start: 2017-03-27 | End: 2017-03-27

## 2017-03-27 RX ORDER — PANTOPRAZOLE SODIUM 20 MG/1
40 TABLET, DELAYED RELEASE ORAL
Qty: 0 | Refills: 0 | Status: DISCONTINUED | OUTPATIENT
Start: 2017-03-27 | End: 2017-03-29

## 2017-03-27 RX ORDER — ALPRAZOLAM 0.25 MG
0.25 TABLET ORAL AT BEDTIME
Qty: 0 | Refills: 0 | Status: DISCONTINUED | OUTPATIENT
Start: 2017-03-27 | End: 2017-03-28

## 2017-03-27 RX ORDER — HEPARIN SODIUM 5000 [USP'U]/ML
5000 INJECTION INTRAVENOUS; SUBCUTANEOUS EVERY 12 HOURS
Qty: 0 | Refills: 0 | Status: DISCONTINUED | OUTPATIENT
Start: 2017-03-27 | End: 2017-03-28

## 2017-03-27 RX ADMIN — SODIUM CHLORIDE 3 MILLILITER(S): 9 INJECTION INTRAMUSCULAR; INTRAVENOUS; SUBCUTANEOUS at 15:28

## 2017-03-27 RX ADMIN — Medication 0.25 MILLIGRAM(S): at 21:32

## 2017-03-27 RX ADMIN — HEPARIN SODIUM 5000 UNIT(S): 5000 INJECTION INTRAVENOUS; SUBCUTANEOUS at 21:32

## 2017-03-27 NOTE — H&P ADULT - ASSESSMENT
Pt is an 83 y/o woman with a h/o anemia,  GI bleeding, CABGx1, cardiac ablation, PM,  who presents with anemia.  She was sent by her PCP for symptomatic anemia, with c/o SOB, fatigue, and generalized weakness x2 weeks.  Pt was recently admitted for GI bleed, with no known source, per son.    Coumadin was discontinued 2 weeks ago due to anemia.   Pt denies dizziness, palpitations,  chest pain, dark stools or blood in her stool.      Pt is admitted w/    I. Symptomatic anemia  - 1 unit PRBC,  followed by lasix  - repeat H/H  - stool guiaic  - falls risk  II. Afib,  off coumadin due to GI bleed and increasing anemia  - rate controlled  - cont meds    III. CAD  - cont B Blocker Pt is an 85 y/o woman with a h/o anemia,  GI bleeding, CABGx1, cardiac ablation, PM,  who presents with anemia.  She was sent by Dr. Carr for symptomatic anemia, with c/o SOB, fatigue, and generalized weakness x2 weeks.  He  reported Hemoglobin dropped from 9.2 - 7.3 on outpt labs.  Pt was recently admitted for GI bleed, with no known source, per son.    Coumadin was discontinued 2 weeks ago due to anemia.   Pt denies dizziness, palpitations,   chest pain, dark stools or blood in her stool.      Pt is admitted w/    I. Symptomatic anemia  - 1 unit PRBC,  followed by lasix  - O2 support  - repeat H/H  - stool guiaic  - falls risk    II. Afib,  High Chads score, off coumadin due to GI bleed and increasing anemia  - rate controlled  - cont B Blocker    III. CAD  - cont B Blocker, ACE    IV. DVT prophylaxis, no heparin due to anemia  - SCDs

## 2017-03-27 NOTE — PATIENT PROFILE ADULT. - TEACHING/LEARNING LEARNING PREFERENCES
computer/internet/audio/written material/skill demonstration/individual instruction/group instruction/verbal instruction

## 2017-03-27 NOTE — ED PROVIDER NOTE - PMH
Anemia    Atrial Fibrillation  2007  on baby aspirin to maintain as per Dr Fernandez  CAD (Coronary Artery Disease) of Bypass Graft  bypass times 1,  12/2007 [facility] [place]  Cardiac Arrest  2007- lead to CABG and mitral valve repair  Cataract  implants 2007  HTN - Hypertension  1988  Hyperlipidemia    Hypothyroid  last TFT's 2 weeks ago - normal  Mitral valve repair  CAD 12/2007 placed on coumadin to remain as per Dr Fernandez  Osteoarthritis  spine, joints  Pacemaker  LACW 2010 - Medtronic ERENDIRA/ [id number] - [place] /[facility]  Spinal Stenosis Anemia    Atrial Fibrillation  2007  on baby aspirin to maintain as per Dr Fernandez  CAD (Coronary Artery Disease) of Bypass Graft  bypass times 1,  12/2007 [facility] [place]  Cardiac Arrest  2007- lead to CABG and mitral valve repair  Cataract  implants 2007  HTN - Hypertension  1988  Hyperlipidemia    Hypothyroid  last TFT's 2 weeks ago - normal  Mitral valve repair  CAD 12/2007 placed on coumadin to remain as per Dr Fernandez  Osteoarthritis  spine, joints  Pacemaker  LACW 2010 - [employer]tronic ERENDIRA/ [id number] - [place] /[facility]  Spinal Stenosis

## 2017-03-27 NOTE — ED PROVIDER NOTE - OBJECTIVE STATEMENT
83 y/o female with a h/o anemia, CABGx1, cardiac ablation, PM, BIB son, sent in by her PCP for anemia, c/o SOB, fatigue, and generalized weakness x2 weeks. +mild bilat LE swelling. Pt was recently admitted for GI bleed, with no known source, per son. Pt has not been on anti-coagulants over the passed 2 weeks. Pt denies any CP. Dr. Duff (PMD).

## 2017-03-27 NOTE — ED PROVIDER NOTE - NS ED MD SCRIBE ATTENDING SCRIBE SECTIONS
HISTORY OF PRESENT ILLNESS/PAST MEDICAL/SURGICAL/SOCIAL HISTORY/REVIEW OF SYSTEMS/RESULTS/PHYSICAL EXAM/PROGRESS NOTE/DISPOSITION

## 2017-03-27 NOTE — H&P ADULT - HISTORY OF PRESENT ILLNESS
Pt is an 83 y/o woman with a h/o anemia,  GI bleeding, CABGx1, cardiac ablation, PM,  who presents with anemia.  She was sent by her PCP for symptomatic anemia, with c/o SOB, fatigue, and generalized weakness x2 weeks.  Pt was recently admitted for GI bleed, with no known source, per son.    Coumadin was discontinued 2 weeks ago due to anemia.   Pt denies dizziness, palpatations,  chest pain, dark stools or blood in her stool.  PMHx: :: CAD S/P CABG AFIB on Coumadin HTN GERD Hypothyroidism Hemerhoids Unremarkable Endoscopy, Upper and Lower 2016  Echo Oct 2015  EF 50-55%  PSHx: :: MULTIPLE BACK SURGERIES 2007 SUNITHA 2007 CABG 2006 BENIGN BREAT TUMOR REMOVED 2006 PARTIAL THYROIDECTOMY 2006 CATARACT SURGERY 2005 TENDON SURGERY Partial Thyroidectomy PPM  Family History: Able to Obtain Family History: Yes; Father Living: NoFather History:: LUNG DZ AGE 71-; Mother LIving: NoMother History:: CARDIAC DZ; Sister Living: YesSister History:: CVA; Sister Living: NoSister History:: ALS; Child Living: NoChild History:: PANCREATIC CA; Child Living: NoChild History:: LUNG DZ  Social History: :: Lives at home.   Pt reports 1 glass of wine 2x/week.   No tobacco, alcohol or illicit drug abuse. Pt is an 83 y/o woman with a h/o anemia,  GI bleeding, CABGx1, cardiac ablation, PM,  who presents with anemia.  She was sent by Dr. Carr for symptomatic anemia, with c/o SOB, fatigue, and generalized weakness x2 weeks.  He  reported Hemoglobin dropped from 9.2 - 7.3 on outpt labs.  Pt was recently admitted for GI bleed, with no known source, per son.    Coumadin was discontinued 2 weeks ago due to anemia.   Pt denies dizziness, palpitations,   chest pain, dark stools or blood in her stool.  PMHx: :: CAD S/P CABG AFIB on Coumadin HTN GERD Hypothyroidism Hemorrhoids Unremarkable Endoscopy, Upper and Lower 2016  Echo Oct 2015  EF 50-55%  PSHx: :: MULTIPLE BACK SURGERIES 2007 SUNITHA 2007 CABG 2006 BENIGN BREAT TUMOR REMOVED 2006 PARTIAL THYROIDECTOMY 2006 CATARACT SURGERY 2005 TENDON SURGERY Partial Thyroidectomy PPM  Family History: Able to Obtain Family History: Yes; Father Living: NoFather History:: LUNG DZ AGE 71-; Mother LIving: NoMother History:: CARDIAC DZ; Sister Living: YesSister History:: CVA; Sister Living: NoSister History:: ALS; Child Living: NoChild History:: PANCREATIC CA; Child Living: NoChild History:: LUNG DZ  Social History: :: Lives at home, her son lives at home.   Pt reports 1 glass of wine 2x/week.   No tobacco, alcohol or illicit drug abuse.  Pt uses a walker

## 2017-03-27 NOTE — PATIENT PROFILE ADULT. - PMH
Anemia    Atrial Fibrillation  2007  on baby aspirin to maintain as per Dr Fernandez  CAD (Coronary Artery Disease) of Bypass Graft  bypass times 1,  12/2007 [facility] [place]  Cardiac Arrest  2007- lead to CABG and mitral valve repair  Cataract  implants 2007  HTN - Hypertension  1988  Hyperlipidemia    Hypothyroid  last TFT's 2 weeks ago - normal  Mitral valve repair  CAD 12/2007 placed on coumadin to remain as per Dr Fernandez  Osteoarthritis  spine, joints  Pacemaker  LACW 2010 - [employer]tronic ERENDIRA/ [id number] - [place] /[facility]  Spinal Stenosis

## 2017-03-27 NOTE — ED STATDOCS - PROGRESS NOTE DETAILS
83 y/o female presents to the ED for anemia with onset worsening in the past week. Last labs noted 9 Hemoglobin. Sent to the ED from PMD's Office, Dr. Sifuentes. C/o weakness, shortness of breath. Patient sent to Main ED for anemia evaluation, possible transfusion.

## 2017-03-27 NOTE — PATIENT PROFILE ADULT. - PSH
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

## 2017-03-27 NOTE — ED ADULT NURSE NOTE - OBJECTIVE STATEMENT
Pt BIB by her son for weakness and anemia. As per son, pt was sent in   by her doctor for anemia. Pt was recently d/c from .

## 2017-03-27 NOTE — ED PROVIDER NOTE - PROGRESS NOTE DETAILS
Spoke with Dr. Sifuentes, who provides add'l hx.  Patient with chronic, recurring symptomatic anemia ?etiology.  Saw Dr. Rosario last admission.  Saw hematology today and felt to require transfusion (1 unit) but too unstable to do as outpatient/ED visit.  To be admitted, consult Katie (heme)

## 2017-03-27 NOTE — ED STATDOCS - PMH
Anemia    Atrial Fibrillation  2007  on baby aspirin to maintain as per Dr Fernandez  CAD (Coronary Artery Disease) of Bypass Graft  bypass times 1,  12/2007 [facility] [place]  Cardiac Arrest  2007- lead to CABG and mitral valve repair  Cataract  implants 2007  HTN - Hypertension  1988  Hyperlipidemia    Hypothyroid  last TFT's 2 weeks ago - normal  Mitral valve repair  CAD 12/2007 placed on coumadin to remain as per Dr Fernandez  Osteoarthritis  spine, joints  Pacemaker  LACW 2010 - [employer]tronic ERENDIRA/ [id number] - [place] /[facility]  Spinal Stenosis

## 2017-03-27 NOTE — ED PROVIDER NOTE - PSH
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

## 2017-03-27 NOTE — H&P ADULT - NSHPREVIEWOFSYSTEMS_GEN_ALL_CORE
Vital Signs Last 24 Hrs  T(C): 36.3, Max: 36.8 (03-27 @ 17:40)  T(F): 97.3, Max: 98.2 (03-27 @ 17:40)  HR: 92 (61 - 92)  BP: 106/45 (97/49 - 114/64)  BP(mean): --  RR: 15 (15 - 18)  SpO2: 97% (96% - 100%)

## 2017-03-28 LAB
ANION GAP SERPL CALC-SCNC: 10 MMOL/L — SIGNIFICANT CHANGE UP (ref 5–17)
BUN SERPL-MCNC: 13 MG/DL — SIGNIFICANT CHANGE UP (ref 7–23)
CALCIUM SERPL-MCNC: 8.2 MG/DL — LOW (ref 8.5–10.1)
CHLORIDE SERPL-SCNC: 109 MMOL/L — HIGH (ref 96–108)
CO2 SERPL-SCNC: 27 MMOL/L — SIGNIFICANT CHANGE UP (ref 22–31)
CREAT SERPL-MCNC: 0.98 MG/DL — SIGNIFICANT CHANGE UP (ref 0.5–1.3)
ERYTHROCYTE [SEDIMENTATION RATE] IN BLOOD: 11 MM/HR — SIGNIFICANT CHANGE UP (ref 0–20)
FERRITIN SERPL-MCNC: 72.8 NG/ML — SIGNIFICANT CHANGE UP (ref 15–150)
GLUCOSE SERPL-MCNC: 88 MG/DL — SIGNIFICANT CHANGE UP (ref 70–99)
HCT VFR BLD CALC: 27.6 % — LOW (ref 34.5–45)
HGB BLD-MCNC: 8.8 G/DL — LOW (ref 11.5–15.5)
IRON SATN MFR SERPL: 24 % — SIGNIFICANT CHANGE UP (ref 14–50)
IRON SATN MFR SERPL: 55 UG/DL — SIGNIFICANT CHANGE UP (ref 30–160)
POTASSIUM SERPL-MCNC: 4 MMOL/L — SIGNIFICANT CHANGE UP (ref 3.5–5.3)
POTASSIUM SERPL-SCNC: 4 MMOL/L — SIGNIFICANT CHANGE UP (ref 3.5–5.3)
PROT SERPL-MCNC: 4.8 G/DL — LOW (ref 6–8.3)
PROT SERPL-MCNC: 4.8 G/DL — LOW (ref 6–8.3)
SODIUM SERPL-SCNC: 146 MMOL/L — HIGH (ref 135–145)
TIBC SERPL-MCNC: 234 UG/DL — SIGNIFICANT CHANGE UP (ref 220–430)
UIBC SERPL-MCNC: 179 UG/DL — SIGNIFICANT CHANGE UP (ref 110–370)

## 2017-03-28 RX ORDER — ESCITALOPRAM OXALATE 10 MG/1
10 TABLET, FILM COATED ORAL DAILY
Qty: 0 | Refills: 0 | Status: DISCONTINUED | OUTPATIENT
Start: 2017-03-28 | End: 2017-03-29

## 2017-03-28 RX ORDER — GABAPENTIN 400 MG/1
100 CAPSULE ORAL
Qty: 0 | Refills: 0 | Status: DISCONTINUED | OUTPATIENT
Start: 2017-03-28 | End: 2017-03-29

## 2017-03-28 RX ORDER — ALPRAZOLAM 0.25 MG
0.25 TABLET ORAL AT BEDTIME
Qty: 0 | Refills: 0 | Status: DISCONTINUED | OUTPATIENT
Start: 2017-03-28 | End: 2017-03-29

## 2017-03-28 RX ORDER — FUROSEMIDE 40 MG
40 TABLET ORAL DAILY
Qty: 0 | Refills: 0 | Status: DISCONTINUED | OUTPATIENT
Start: 2017-03-28 | End: 2017-03-29

## 2017-03-28 RX ORDER — OXYCODONE HYDROCHLORIDE 5 MG/1
0 TABLET ORAL
Qty: 0 | Refills: 0 | COMMUNITY

## 2017-03-28 RX ORDER — DONEPEZIL HYDROCHLORIDE 10 MG/1
10 TABLET, FILM COATED ORAL AT BEDTIME
Qty: 0 | Refills: 0 | Status: DISCONTINUED | OUTPATIENT
Start: 2017-03-28 | End: 2017-03-29

## 2017-03-28 RX ORDER — MEMANTINE HYDROCHLORIDE 10 MG/1
5 TABLET ORAL DAILY
Qty: 0 | Refills: 0 | Status: DISCONTINUED | OUTPATIENT
Start: 2017-03-28 | End: 2017-03-29

## 2017-03-28 RX ORDER — ATENOLOL 25 MG/1
50 TABLET ORAL DAILY
Qty: 0 | Refills: 0 | Status: DISCONTINUED | OUTPATIENT
Start: 2017-03-28 | End: 2017-03-29

## 2017-03-28 RX ORDER — LOSARTAN POTASSIUM 100 MG/1
25 TABLET, FILM COATED ORAL DAILY
Qty: 0 | Refills: 0 | Status: DISCONTINUED | OUTPATIENT
Start: 2017-03-28 | End: 2017-03-29

## 2017-03-28 RX ADMIN — PANTOPRAZOLE SODIUM 40 MILLIGRAM(S): 20 TABLET, DELAYED RELEASE ORAL at 05:59

## 2017-03-28 RX ADMIN — ESCITALOPRAM OXALATE 10 MILLIGRAM(S): 10 TABLET, FILM COATED ORAL at 11:30

## 2017-03-28 RX ADMIN — GABAPENTIN 100 MILLIGRAM(S): 400 CAPSULE ORAL at 17:00

## 2017-03-28 RX ADMIN — MEMANTINE HYDROCHLORIDE 5 MILLIGRAM(S): 10 TABLET ORAL at 11:30

## 2017-03-28 RX ADMIN — Medication 88 MICROGRAM(S): at 05:59

## 2017-03-28 RX ADMIN — GABAPENTIN 100 MILLIGRAM(S): 400 CAPSULE ORAL at 05:59

## 2017-03-28 RX ADMIN — DONEPEZIL HYDROCHLORIDE 10 MILLIGRAM(S): 10 TABLET, FILM COATED ORAL at 21:49

## 2017-03-28 NOTE — CONSULT NOTE ADULT - SUBJECTIVE AND OBJECTIVE BOX
PCP:    REQUESTING PHYSICIAN: Dr. Landin    REASON FOR CONSULT: Anemia    CHIEF COMPLAINT: Anemia    HPI:  Pt is an 83 y/o woman with a h/o anemia,  GI bleeding, CABGx1, cardiac ablation, PM,  who presents with anemia.  She was sent by Dr. Shoemaker for symptomatic anemia, with c/o SOB, fatigue, and generalized weakness x2 weeks.  Reported Hemoglobin dropped from 9.2 - 7.3 on outpatient labs.  Pt was recently admitted for GI bleed, with no known source, per son.    Coumadin was discontinued 2 weeks ago due to anemia.   Pt denies dizziness, palpitations,   chest pain, dark stools or blood in her stool.  PMHx: :: CAD S/P CABG AFIB on Coumadin HTN GERD Hypothyroidism Hemorrhoids Unremarkable Endoscopy, Upper and Lower 2016  Echo Oct 2015  EF 50-55%  PSHx: :: MULTIPLE BACK SURGERIES 2007 SUNITHA 2007 CABG 2006 BENIGN BREAT TUMOR REMOVED 2006 PARTIAL THYROIDECTOMY 2006 CATARACT SURGERY 2005 TENDON SURGERY Partial Thyroidectomy PPM  Family History: Able to Obtain Family History: Yes; Father Living: NoFather History:: LUNG DZ AGE 71-; Mother LIving: NoMother History:: CARDIAC DZ; Sister Living: YesSister History:: CVA; Sister Living: NoSister History:: ALS; Child Living: NoChild History:: PANCREATIC CA; Child Living: NoChild History:: LUNG DZ  Social History: :: Lives at home, her son lives at home.   Pt reports 1 glass of wine 2x/week.   No tobacco, alcohol or illicit drug abuse.  Pt uses a walker (27 Mar 2017 17:34)      REVIEW OF SYSTEMS:    CONSTITUTIONAL: No weakness, fevers or chills  EYES/ENT: No visual changes;  No vertigo or throat pain   NECK: No pain or stiffness  RESPIRATORY: No cough, wheezing, hemoptysis; No shortness of breath  CARDIOVASCULAR: No chest pain or palpitations  GASTROINTESTINAL: No abdominal or epigastric pain. No nausea, vomiting, or hematemesis; No diarrhea or constipation. No melena or hematochezia.  GENITOURINARY: No dysuria, frequency or hematuria  NEUROLOGICAL: No numbness or weakness  SKIN: No itching, burning, rashes, or lesions   All other review of systems is negative unless indicated above    Vital Signs Last 24 Hrs  T(C): 36.7, Max: 36.8 (03-27 @ 17:40)  T(F): 98.1, Max: 98.2 (03-27 @ 17:40)  HR: 62 (61 - 92)  BP: 109/59 (97/49 - 114/64)  BP(mean): --  RR: 17 (15 - 18)  SpO2: 97% (96% - 100%)    I&O's Summary    I & Os for current day (as of 28 Mar 2017 08:26)  =============================================  IN: 331 ml / OUT: 0 ml / NET: 331 ml      CAPILLARY BLOOD GLUCOSE      PHYSICAL EXAM:    Constitutional: NAD, awake and alert, well-developed  HEENT: PERR, EOMI, Normal Hearing, MMM  Neck: Soft and supple, No LAD, No JVD  Respiratory: Breath sounds are clear bilaterally, No wheezing, rales or rhonchi  Cardiovascular: S1 and S2, regular rate and rhythm, no Murmurs, gallops or rubs  Gastrointestinal: Bowel Sounds present, soft, nontender, nondistended, no guarding, no rebound  Extremities: No peripheral edema  Vascular: 2+ peripheral pulses  Neurological: A/O x 3, no focal deficits  Musculoskeletal: 5/5 strength b/l upper and lower extremities  Skin: No rashes    MEDICATIONS:  MEDICATIONS  (STANDING):  levothyroxine 88MICROGram(s) Oral daily  pantoprazole    Tablet 40milliGRAM(s) Oral before breakfast  heparin  Injectable 5000Unit(s) SubCutaneous every 12 hours  losartan 25milliGRAM(s) Oral daily  donepezil 10milliGRAM(s) Oral at bedtime  memantine 5milliGRAM(s) Oral daily  gabapentin 100milliGRAM(s) Oral two times a day  ATENolol  Tablet 50milliGRAM(s) Oral daily  furosemide    Tablet 40milliGRAM(s) Oral daily      LABS: All Labs Reviewed:                        8.8    x     )-----------( x        ( 28 Mar 2017 06:46 )             27.6     28 Mar 2017 06:46    146    |  109    |  13     ----------------------------<  88     4.0     |  27     |  0.98     Ca    8.2        28 Mar 2017 06:46    TPro  5.6    /  Alb  2.6    /  TBili  0.5    /  DBili  x      /  AST  15     /  ALT  12     /  AlkPhos  103    27 Mar 2017 13:49    PT/INR - ( 27 Mar 2017 13:49 )   PT: 12.6 sec;   INR: 1.16 ratio         PTT - ( 27 Mar 2017 13:49 )  PTT:33.0 sec          RADIOLOGY/EKG:    PROCEDURE DATE:  03/07/2017    INTERPRETATION:    CTA Abdomen and pelvis -- Specifc CT protocol for the evaluation of GI   bleeding  (CT of the abdomen and pelvis with and without IV contrast)  IMPRESSION:  No cause of active GI bleeding identified. Thickening of the   gastric folds noted in the gastric fundus and body correlate clinically   for possibility of gastritis.

## 2017-03-28 NOTE — CONSULT NOTE ADULT - SUBJECTIVE AND OBJECTIVE BOX
HPI:  Pt is an 85 y/o woman with a h/o anemia,  GI bleeding, CABGx1, cardiac ablation, PM,  who presents with anemia.  She was sent by Dr. Carr for symptomatic anemia, with c/o SOB, fatigue, and generalized weakness x2 weeks.  He  reported Hemoglobin dropped from 9.2 - 7.3 on outpt labs.  Pt was recently admitted for GI bleed, with no known source, per son.    Coumadin was discontinued 2 weeks ago due to anemia.   Pt denies dizziness, palpitations,   chest pain, dark stools or blood in her stool.     Has had 2 EGDs and one colonoscopy over last 1-2 years without source of GI bleeding.    PSHx: :: MULTIPLE BACK SURGERIES 2007 SUNITHA 2007 CABG 2006 BENIGN BREAT TUMOR REMOVED 2006 PARTIAL THYROIDECTOMY 2006 CATARACT SURGERY 2005 TENDON SURGERY Partial Thyroidectomy PPM  PANCREATIC CA; Child Living: NoChild History:: LUNG DZ      PAST MEDICAL & SURGICAL HISTORY:  Anemia  Cataract: implants   Cardiac Arrest: - lead to CABG and mitral valve repair  Spinal Stenosis  Osteoarthritis: spine, joints  Hypothyroid: last TFT&#x27;s 2 weeks ago - normal  CAD (Coronary Artery Disease) of Bypass Graft: bypass times 1,  2007 [facility]  Mitral valve repair: CAD 2007 placed on coumadin to remain as per Dr Fernandez  Hyperlipidemia  HTN - Hypertension:   Pacemaker: LACW  - [employer] ADDROI/ [id number] - [facility]  Atrial Fibrillation:   on baby aspirin to maintain as per Dr Fernandez  Depression: 3 years  History of Tonsillectomy: as child  Blepharoptosis:  - bilateral  partial thyroidectomy:   Breast Cyst: left - benign   hysterectomy:   Facet block: of spine 2005  Spinal Disorder: spinal stimulator trial  - then removed  pacemaker: LACW  [employer]tronic ADDROI/[id number]  mitral valve repair/ CAB bypass times 1 - [facility]      MEDICATIONS  (STANDING):  levothyroxine 88MICROGram(s) Oral daily  pantoprazole    Tablet 40milliGRAM(s) Oral before breakfast  heparin  Injectable 5000Unit(s) SubCutaneous every 12 hours  losartan 25milliGRAM(s) Oral daily  donepezil 10milliGRAM(s) Oral at bedtime  memantine 5milliGRAM(s) Oral daily  gabapentin 100milliGRAM(s) Oral two times a day  ATENolol  Tablet 50milliGRAM(s) Oral daily  furosemide    Tablet 40milliGRAM(s) Oral daily      Allergies  amoxicillin (Rash)  Daypro (Other)  Keflex (Rash)  Tikosyn (Unknown)    Social History: :: Lives at home, her son lives at home.   Pt reports 1 glass of wine 2x/week.   No tobacco, alcohol or illicit drug abuse.  Pt uses a walker       Family History: Able to Obtain Family History: Yes; Father Living: NoFather History:: LUNG DZ AGE 71-; Mother LIving: NoMother History:: CARDIAC DZ; Sister Living: YesSister History:: CVA; Sister Living: NoSister History:: ALS; Child Living: NoChild History::       As above  Otherwise unremarkable    Vital Signs Last 24 Hrs  T(C): 36.7, Max: 36.8 ( @ 17:40)  T(F): 98.1, Max: 98.2 ( @ 17:40)  HR: 62 (61 - 92)  BP: 109/59 (97/49 - 114/64)  BP(mean): --  RR: 17 (15 - 18)  SpO2: 97% (96% - 100%)    Constitutional: NAD, well-developed  Respiratory: CTAB  Cardiovascular: S1 and S2, RRR  Gastrointestinal: BS+, soft, NT/ND  Extremities: No peripheral edema  Psychiatric: Normal mood, normal affect  Skin: No rashes  Rectal: brown stool, GUAIAC NEGATIVE    LABS:                        8.8    x     )-----------( x        ( 28 Mar 2017 06:46 )             27.6     28 Mar 2017 06:46    146    |  109    |  13     ----------------------------<  88     4.0     |  27     |  0.98     Ca    8.2        28 Mar 2017 06:46    TPro  5.6    /  Alb  2.6    /  TBili  0.5    /  DBili  x      /  AST  15     /  ALT  12     /  AlkPhos  103    27 Mar 2017 13:49    PT/INR - ( 27 Mar 2017 13:49 )   PT: 12.6 sec;   INR: 1.16 ratio         PTT - ( 27 Mar 2017 13:49 )  PTT:33.0 sec  LIVER FUNCTIONS - ( 27 Mar 2017 13:49 )  Alb: 2.6 g/dL / Pro: 5.6 gm/dL / ALK PHOS: 103 U/L / ALT: 12 U/L / AST: 15 U/L / GGT: x

## 2017-03-28 NOTE — PROGRESS NOTE ADULT - SUBJECTIVE AND OBJECTIVE BOX
Patient is a 84y old  Female who presents with a chief complaint of weakness (27 Mar 2017 17:34)      HPI:  Pt is an 83 y/o woman with a h/o anemia,  GI bleeding, CABGx1, cardiac ablation, PM,  who presents with anemia.  She was sent by Dr. Carr for symptomatic anemia, with c/o SOB, fatigue, and generalized weakness x2 weeks.  He  reported Hemoglobin dropped from 9.2 - 7.3 on outpt labs.  Pt was recently admitted for GI bleed, with no known source, per son.    Coumadin was discontinued 2 weeks ago due to anemia.   Pt denies dizziness, palpitations,   chest pain, dark stools or blood in her stool.  PMHx: :: CAD S/P CABG AFIB on Coumadin HTN GERD Hypothyroidism Hemorrhoids Unremarkable Endoscopy, Upper and Lower 2016  Echo Oct 2015  EF 50-55%  PSHx: :: MULTIPLE BACK SURGERIES 2007 SUNITHA 2007 CABG 2006 BENIGN BREAT TUMOR REMOVED 2006 PARTIAL THYROIDECTOMY 2006 CATARACT SURGERY 2005 TENDON SURGERY Partial Thyroidectomy PPM  Family History: Able to Obtain Family History: Yes; Father Living: NoFather History:: LUNG DZ AGE 71-; Mother LIving: NoMother History:: CARDIAC DZ; Sister Living: YesSister History:: CVA; Sister Living: NoSister History:: ALS; Child Living: NoChild History:: PANCREATIC CA; Child Living: NoChild History:: LUNG DZ  Social History: :: Lives at home, her son lives at home.   Pt reports 1 glass of wine 2x/week.   No tobacco, alcohol or illicit drug abuse.  Pt uses a walker (27 Mar 2017 17:34).    3/28: No complaints. No fever, chills, abd pain, hematuria, hematochezia, melana or recent bleeding.  Denies SOB, CP, dizziness or weakness.      Review of system- Rest of the review of system are normal except mentioned in HPI      Daily Height in cm: 177.8 (27 Mar 2017 21:40)    Daily     Vital Signs Last 24 Hrs  T(C): 36.6, Max: 36.8 (03-27 @ 17:40)  T(F): 97.9, Max: 98.2 (03-27 @ 17:40)  HR: 60 (60 - 92)  BP: 98/51 (97/49 - 114/64)  BP(mean): --  RR: 17 (15 - 18)  SpO2: 97% (96% - 100%)    PHYSICAL EXAM:    Constitutional: NAD, awake and alert, well-developed  HEENT: PERR, EOMI, Normal Hearing, MMM  Neck: Soft and supple, No LAD, No JVD  Respiratory: Breath sounds are clear bilaterally, No wheezing, rales or rhonchi  Cardiovascular: S1 and S2, regular rate and rhythm, no Murmurs, gallops or rubs  Gastrointestinal: Bowel Sounds present, soft, nontender, nondistended, no guarding, no rebound  Extremities: No peripheral edema  Vascular: 2+ peripheral pulses  Neurological: A/O x 3, no focal deficits  Musculoskeletal: 5/5 strength b/l upper and lower extremities  Skin: No rashes            Allergies    amoxicillin (Rash)  Daypro (Other)  Keflex (Rash)  Tikosyn (Unknown)    Intolerances      MEDICATIONS  (STANDING):  levothyroxine 88MICROGram(s) Oral daily  pantoprazole    Tablet 40milliGRAM(s) Oral before breakfast  heparin  Injectable 5000Unit(s) SubCutaneous every 12 hours  losartan 25milliGRAM(s) Oral daily  donepezil 10milliGRAM(s) Oral at bedtime  memantine 5milliGRAM(s) Oral daily  gabapentin 100milliGRAM(s) Oral two times a day  ATENolol  Tablet 50milliGRAM(s) Oral daily  furosemide    Tablet 40milliGRAM(s) Oral daily  escitalopram 10milliGRAM(s) Oral daily    MEDICATIONS  (PRN):  acetaminophen   Tablet. 650milliGRAM(s) Oral every 6 hours PRN Mild Pain (1 - 3)  docusate sodium 100milliGRAM(s) Oral daily PRN Constipation  ALPRAZolam 0.25milliGRAM(s) Oral at bedtime PRN insomnia                                    8.8    x     )-----------( x        ( 28 Mar 2017 06:46 )             27.6     28 Mar 2017 06:46    146    |  109    |  13     ----------------------------<  88     4.0     |  27     |  0.98     Ca    8.2        28 Mar 2017 06:46    TPro  5.6    /  Alb  2.6    /  TBili  0.5    /  DBili  x      /  AST  15     /  ALT  12     /  AlkPhos  103    27 Mar 2017 13:49    CAPILLARY BLOOD GLUCOSE    LIVER FUNCTIONS - ( 27 Mar 2017 13:49 )  Alb: 2.6 g/dL / Pro: 5.6 gm/dL / ALK PHOS: 103 U/L / ALT: 12 U/L / AST: 15 U/L / GGT: x           PT/INR - ( 27 Mar 2017 13:49 )   PT: 12.6 sec;   INR: 1.16 ratio         PTT - ( 27 Mar 2017 13:49 )  PTT:33.0 sec    Assessment and Plan:   Assessment:  · Assessment		    Pt is an 83 y/o woman with a h/o anemia,  GI bleeding, CABGx1, cardiac ablation, PM,  who presents with symptomatic anemia.     Pt is admitted w/    I. Symptomatic anemia  - s/p 1 unit PRBC,  followed by lasix now feeling better.  - O2 support  - repeat H/H in AM  - anemia studies pending  - heme evaluation appreciated  - stool guiaic negative  - outpatient pill cam study with Dr. Rosario.    II. Afib,  High Chads score, off coumadin due to GI bleed and increasing anemia  - rate controlled  - cont B Blocker    III. CAD  - cont B Blocker, ACE    IV. DVT prophylaxis, no heparin due to anemia  - SCDs      Attending Statement:  >40 minutes spent on total encounter; more than 50% of the visit was spent counseling and/or coordinating care by the attending physician.

## 2017-03-28 NOTE — CONSULT NOTE ADULT - ASSESSMENT
1) Anemia - Guiac negative with stable H/H on labs at [facility] (now Hgb of 8.8).  Discussed with GI.  Patient has had episodes of epistaxis which likely are contributing to her anemia.  Patient has had 2 EGD in the last 1-2 years and GI plans for possible outpatient small bowel capsule.  Iron studies pending.  Will add ESR and reticulocyte count and monoclonal labs..

## 2017-03-28 NOTE — CONSULT NOTE ADULT - ASSESSMENT
Imp;  Chronic anemia but guaiac negative. Still cannot definitively rule out intermittent GI source of bleeding (would presumably be small bowel) but should consider other etiologies as well    Rec:  Check iron studies  Heme eval pending  No plans to repeat endoscopic evaluation inpatient.  However, I would advocate for outpatient capsule endoscopy (hospital does not have capsule equipment).

## 2017-03-29 VITALS — WEIGHT: 136.69 LBS

## 2017-03-29 LAB
% ALBUMIN: 54.1 % — SIGNIFICANT CHANGE UP
% ALPHA 1: 7.9 % — SIGNIFICANT CHANGE UP
% ALPHA 2: 10.2 % — SIGNIFICANT CHANGE UP
% BETA: 10.8 % — SIGNIFICANT CHANGE UP
% GAMMA: 17 % — SIGNIFICANT CHANGE UP
ALBUMIN SERPL ELPH-MCNC: 2.6 G/DL — LOW (ref 3.6–5.5)
ALBUMIN/GLOB SERPL ELPH: 1.2 RATIO — SIGNIFICANT CHANGE UP
ALPHA1 GLOB SERPL ELPH-MCNC: 0.4 G/DL — SIGNIFICANT CHANGE UP (ref 0.1–0.4)
ALPHA2 GLOB SERPL ELPH-MCNC: 0.5 G/DL — SIGNIFICANT CHANGE UP (ref 0.5–1)
B-GLOBULIN SERPL ELPH-MCNC: 0.5 G/DL — SIGNIFICANT CHANGE UP (ref 0.5–1)
GAMMA GLOBULIN: 0.8 G/DL — SIGNIFICANT CHANGE UP (ref 0.6–1.6)
HAPTOGLOB SERPL-MCNC: 52 MG/DL — SIGNIFICANT CHANGE UP (ref 34–200)
HCT VFR BLD CALC: 28.5 % — LOW (ref 34.5–45)
HGB BLD-MCNC: 8.9 G/DL — LOW (ref 11.5–15.5)
INTERPRETATION SERPL IFE-IMP: SIGNIFICANT CHANGE UP
KAPPA LC SER QL IFE: 3.07 MG/DL — HIGH (ref 0.33–1.94)
KAPPA/LAMBDA FREE LIGHT CHAIN RATIO, SERUM: 1.36 RATIO — SIGNIFICANT CHANGE UP (ref 0.26–1.65)
LAMBDA LC SER QL IFE: 2.26 MG/DL — SIGNIFICANT CHANGE UP (ref 0.57–2.63)
LDH SERPL L TO P-CCNC: 154 U/L — SIGNIFICANT CHANGE UP (ref 50–242)
MCHC RBC-ENTMCNC: 30.4 PG — SIGNIFICANT CHANGE UP (ref 27–34)
MCHC RBC-ENTMCNC: 31.2 GM/DL — LOW (ref 32–36)
MCV RBC AUTO: 97.3 FL — SIGNIFICANT CHANGE UP (ref 80–100)
PLATELET # BLD AUTO: 176 K/UL — SIGNIFICANT CHANGE UP (ref 150–400)
PROT PATTERN SERPL ELPH-IMP: SIGNIFICANT CHANGE UP
RBC # BLD: 2.93 M/UL — LOW (ref 3.8–5.2)
RBC # BLD: 2.99 M/UL — LOW (ref 3.8–5.2)
RBC # FLD: 18.3 % — HIGH (ref 10.3–14.5)
RETICS #: 155.8 K/UL — HIGH (ref 25–125)
RETICS/RBC NFR: 5.2 % — HIGH (ref 0.5–2.5)
WBC # BLD: 5.3 K/UL — SIGNIFICANT CHANGE UP (ref 3.8–10.5)
WBC # FLD AUTO: 5.3 K/UL — SIGNIFICANT CHANGE UP (ref 3.8–10.5)

## 2017-03-29 RX ORDER — ATENOLOL 25 MG/1
50 TABLET ORAL
Qty: 0 | Refills: 0 | COMMUNITY

## 2017-03-29 RX ORDER — LOSARTAN POTASSIUM 100 MG/1
1 TABLET, FILM COATED ORAL
Qty: 0 | Refills: 0 | COMMUNITY
Start: 2017-03-29

## 2017-03-29 RX ORDER — FUROSEMIDE 40 MG
1 TABLET ORAL
Qty: 0 | Refills: 0 | COMMUNITY
Start: 2017-03-29

## 2017-03-29 RX ORDER — PANTOPRAZOLE SODIUM 20 MG/1
1 TABLET, DELAYED RELEASE ORAL
Qty: 0 | Refills: 0 | COMMUNITY
Start: 2017-03-29

## 2017-03-29 RX ORDER — ESCITALOPRAM OXALATE 10 MG/1
1 TABLET, FILM COATED ORAL
Qty: 0 | Refills: 0 | COMMUNITY

## 2017-03-29 RX ORDER — DONEPEZIL HYDROCHLORIDE 10 MG/1
1 TABLET, FILM COATED ORAL
Qty: 0 | Refills: 0 | COMMUNITY
Start: 2017-03-29

## 2017-03-29 RX ORDER — FUROSEMIDE 40 MG
20 TABLET ORAL
Qty: 0 | Refills: 0 | COMMUNITY

## 2017-03-29 RX ORDER — LEVOTHYROXINE SODIUM 125 MCG
88 TABLET ORAL
Qty: 0 | Refills: 0 | COMMUNITY

## 2017-03-29 RX ORDER — ALPRAZOLAM 0.25 MG
1 TABLET ORAL
Qty: 0 | Refills: 0 | COMMUNITY
Start: 2017-03-29

## 2017-03-29 RX ORDER — ESCITALOPRAM OXALATE 10 MG/1
1 TABLET, FILM COATED ORAL
Qty: 0 | Refills: 0 | COMMUNITY
Start: 2017-03-29

## 2017-03-29 RX ORDER — ATENOLOL 25 MG/1
1 TABLET ORAL
Qty: 0 | Refills: 0 | COMMUNITY
Start: 2017-03-29

## 2017-03-29 RX ORDER — LEVOTHYROXINE SODIUM 125 MCG
1 TABLET ORAL
Qty: 0 | Refills: 0 | COMMUNITY
Start: 2017-03-29

## 2017-03-29 RX ORDER — MEMANTINE HYDROCHLORIDE 10 MG/1
1 TABLET ORAL
Qty: 0 | Refills: 0 | COMMUNITY
Start: 2017-03-29

## 2017-03-29 RX ORDER — PANTOPRAZOLE SODIUM 20 MG/1
40 TABLET, DELAYED RELEASE ORAL
Qty: 0 | Refills: 0 | COMMUNITY

## 2017-03-29 RX ORDER — GABAPENTIN 400 MG/1
1 CAPSULE ORAL
Qty: 0 | Refills: 0 | COMMUNITY
Start: 2017-03-29

## 2017-03-29 RX ADMIN — Medication 40 MILLIGRAM(S): at 05:30

## 2017-03-29 RX ADMIN — ESCITALOPRAM OXALATE 10 MILLIGRAM(S): 10 TABLET, FILM COATED ORAL at 11:17

## 2017-03-29 RX ADMIN — MEMANTINE HYDROCHLORIDE 5 MILLIGRAM(S): 10 TABLET ORAL at 11:17

## 2017-03-29 RX ADMIN — GABAPENTIN 100 MILLIGRAM(S): 400 CAPSULE ORAL at 05:30

## 2017-03-29 RX ADMIN — LOSARTAN POTASSIUM 25 MILLIGRAM(S): 100 TABLET, FILM COATED ORAL at 05:30

## 2017-03-29 RX ADMIN — Medication 88 MICROGRAM(S): at 05:30

## 2017-03-29 RX ADMIN — PANTOPRAZOLE SODIUM 40 MILLIGRAM(S): 20 TABLET, DELAYED RELEASE ORAL at 10:04

## 2017-03-29 NOTE — PHYSICAL THERAPY INITIAL EVALUATION ADULT - MODALITIES TREATMENT COMMENTS
pt left seated in Shalini-chair post Eval; chair alarm donned; flowtrons in place; lap tray secured; callbell in reach; pt instructed not to get up alone; call nursing for assist; kari well; pt denied pain

## 2017-03-29 NOTE — DISCHARGE NOTE ADULT - MEDICATION SUMMARY - MEDICATIONS TO STOP TAKING
I will STOP taking the medications listed below when I get home from the hospital:    oxyCODONE  --  by mouth    vancomycin 250 mg/5 mL oral solution  -- 5 milliliter(s) by mouth every 6 hours I will STOP taking the medications listed below when I get home from the hospital:    vancomycin 250 mg/5 mL oral solution  -- 5 milliliter(s) by mouth every 6 hours

## 2017-03-29 NOTE — PROGRESS NOTE ADULT - SUBJECTIVE AND OBJECTIVE BOX
Patient is a 84y old  Female who presents with a chief complaint of weakness (27 Mar 2017 17:34)      HPI:  Pt is an 85 y/o woman with a h/o anemia,  GI bleeding, CABGx1, cardiac ablation, PM,  who presents with anemia.  She was sent by Dr. Carr for symptomatic anemia, with c/o SOB, fatigue, and generalized weakness x2 weeks.  He  reported Hemoglobin dropped from 9.2 - 7.3 on outpt labs.  Pt was recently admitted for GI bleed, with no known source, per son.    Coumadin was discontinued 2 weeks ago due to anemia.   Pt denies dizziness, palpitations,   chest pain, dark stools or blood in her stool.  PMHx: :: CAD S/P CABG AFIB on Coumadin HTN GERD Hypothyroidism Hemorrhoids Unremarkable Endoscopy, Upper and Lower   Echo Oct 2015  EF 50-55%  PSHx: :: MULTIPLE BACK SURGERIES 2007 SUNITHA 2007 CABG 2006 BENIGN BREAT TUMOR REMOVED 2006 PARTIAL THYROIDECTOMY 2006 CATARACT SURGERY 2005 TENDON SURGERY Partial Thyroidectomy PPM  Family History: Able to Obtain Family History: Yes; Father Living: NoFather History:: LUNG DZ AGE 71-; Mother LIving: NoMother History:: CARDIAC DZ; Sister Living: YesSister History:: CVA; Sister Living: NoSister History:: ALS; Child Living: NoChild History:: PANCREATIC CA; Child Living: NoChild History:: LUNG DZ  Social History: :: Lives at home, her son lives at home.   Pt reports 1 glass of wine 2x/week.   No tobacco, alcohol or illicit drug abuse.  Pt uses a walker (27 Mar 2017 17:34)      PAST MEDICAL & SURGICAL HISTORY:  Anemia  Cataract: implants   Cardiac Arrest: - lead to CABG and mitral valve repair  Spinal Stenosis  Osteoarthritis: spine, joints  Hypothyroid: last TFT&#x27;s 2 weeks ago - normal  CAD (Coronary Artery Disease) of Bypass Graft: bypass times 1,  2007 [facility]  Mitral valve repair: CAD 2007 placed on coumadin to remain as per Dr Fernandez  Hyperlipidemia  HTN - Hypertension:   Pacemaker: LACW  - Medtronic ADDROI/ [id number] - [facility]  Atrial Fibrillation:   on baby aspirin to maintain as per Dr Fernandez  Depression: 3 years  History of Tonsillectomy: as child  Blepharoptosis:  - bilateral  partial thyroidectomy:   Breast Cyst: left - benign   hysterectomy: 1967  Facet block: of spine   Spinal Disorder: spinal stimulator trial  - then removed  pacemaker: LACW  Medtronic ADDROI/[id number]  mitral valve repair/ CAB bypass times 1 - [facility]      Social History    FAMILY HISTORY:  No pertinent family history in first degree relatives      MEDICATIONS  (STANDING):  levothyroxine 88MICROGram(s) Oral daily  pantoprazole    Tablet 40milliGRAM(s) Oral before breakfast  losartan 25milliGRAM(s) Oral daily  donepezil 10milliGRAM(s) Oral at bedtime  memantine 5milliGRAM(s) Oral daily  gabapentin 100milliGRAM(s) Oral two times a day  ATENolol  Tablet 50milliGRAM(s) Oral daily  furosemide    Tablet 40milliGRAM(s) Oral daily  escitalopram 10milliGRAM(s) Oral daily    MEDICATIONS  (PRN):  acetaminophen   Tablet. 650milliGRAM(s) Oral every 6 hours PRN Mild Pain (1 - 3)  docusate sodium 100milliGRAM(s) Oral daily PRN Constipation  ALPRAZolam 0.25milliGRAM(s) Oral at bedtime PRN insomnia      Allergies    amoxicillin (Rash)  Daypro (Other)  Keflex (Rash)  Tikosyn (Unknown)    Intolerances                          8.9    5.3   )-----------( 176      ( 29 Mar 2017 06:44 )             28.5       Lactate Dehydrogenase, Serum: 154 U/L ( @ 06:44)      PT/INR - ( 27 Mar 2017 13:49 )   PT: 12.6 sec;   INR: 1.16 ratio         PTT - ( 27 Mar 2017 13:49 )  PTT:33.0 sec    28 Mar 2017 06:46    146    |  109    |  13     ----------------------------<  88     4.0     |  27     |  0.98     Ca    8.2        28 Mar 2017 06:46    TPro  4.8    /  Alb  x      /  TBili  x      /  DBili  x      /  AST  x      /  ALT  x      /  AlkPhos  x      28 Mar 2017 11:54

## 2017-03-29 NOTE — DISCHARGE NOTE ADULT - MEDICATION SUMMARY - MEDICATIONS TO TAKE
I will START or STAY ON the medications listed below when I get home from the hospital:    losartan 25 mg oral tablet  -- 1 tab(s) by mouth once a day  -- Indication: For HTN    gabapentin 100 mg oral capsule  -- 1 cap(s) by mouth 2 times a day  -- Indication: For Pain    escitalopram 10 mg oral tablet  -- 1 tab(s) by mouth once a day  -- Indication: For general    ALPRAZolam 0.25 mg oral tablet  -- 1 tab(s) by mouth once a day (at bedtime), As needed, insomnia  -- Indication: For ANxiety    atenolol 50 mg oral tablet  -- 1 tab(s) by mouth once a day  -- Indication: For HTN    donepezil 10 mg oral tablet  -- 1 tab(s) by mouth once a day (at bedtime)  -- Indication: For dementia    furosemide 40 mg oral tablet  -- 1 tab(s) by mouth once a day  -- Indication: For HTN    memantine 5 mg oral tablet  -- 1 tab(s) by mouth once a day  -- Indication: For dementia    pantoprazole 40 mg oral delayed release tablet  -- 1 tab(s) by mouth once a day (before a meal)  -- Indication: For gerd    levothyroxine 88 mcg (0.088 mg) oral tablet  -- 1 tab(s) by mouth once a day  -- Indication: For hypotheyroid I will START or STAY ON the medications listed below when I get home from the hospital:    losartan 25 mg oral tablet  -- 1 tab(s) by mouth once a day  -- Indication: For Hypertension    gabapentin 100 mg oral capsule  -- 1 cap(s) by mouth 2 times a day  -- Indication: For neuropathy    escitalopram 10 mg oral tablet  -- 1 tab(s) by mouth once a day  -- Indication: For Depression, unspecified depression type    ALPRAZolam 0.25 mg oral tablet  -- 1 tab(s) by mouth once a day (at bedtime), As needed, insomnia  -- Indication: For ANxiety    atenolol 50 mg oral tablet  -- 1 tab(s) by mouth once a day  -- Indication: For Hypertension    donepezil 10 mg oral tablet  -- 1 tab(s) by mouth once a day (at bedtime)  -- Indication: For Dementia    furosemide 40 mg oral tablet  -- 1 tab(s) by mouth once a day  -- Indication: For fluid retention    memantine 5 mg oral tablet  -- 1 tab(s) by mouth once a day  -- Indication: For Dementia    pantoprazole 40 mg oral delayed release tablet  -- 1 tab(s) by mouth once a day (before a meal)  -- Indication: For gerd    levothyroxine 88 mcg (0.088 mg) oral tablet  -- 1 tab(s) by mouth once a day  -- Indication: For Hypothyroidism, unspecified type

## 2017-03-29 NOTE — DISCHARGE NOTE ADULT - PLAN OF CARE
symptom free f/u with your PCP for a rpt CBC in 3-5 days  f/u with Dr Miller in 1 week for a possible pill cam to check for possible source of bleed prevent worsening continue escitalopram continue current antihypertensive regimen continue synthroid

## 2017-03-29 NOTE — CONSULT NOTE ADULT - SUBJECTIVE AND OBJECTIVE BOX
Pt is an 85 y/o woman with a h/o anemia,  GI bleeding, CABGx1, cardiac ablation, PM,  who presents with anemia.  She was sent by Dr. Carr for symptomatic anemia, with c/o SOB, fatigue, and generalized weakness x2 weeks.  He  reported Hemoglobin dropped from 9.2 - 7.3 on outpt labs.  Pt was recently admitted for GI bleed, with no known source, per son.    Coumadin was discontinued 2 weeks ago due to anemia.   Pt has signed herself out of rehab and then told home care workers to leave her house. Psychiatry consult called for capacity as pt needs rehab.     Upon interview pt is alert and oriented x3, pleasant, calm, cooperative. MMSE 25/30 with 0/3 item recall. Pt could not name the country we are in, was unable to copy patterns and performed poorly on the clock drawing test. Confabulation was apparent for most of interview with false information about her life and her activities. She told writer that he children drove her to appointment as they won't let her drive anymore, but then later told writer about her active life of seeing her friends and driving around town. She also denies that she refused rehab and denied that healthcare aids were ever offered or came to her house. She could not discuss why she was in the hospital and was not able to list any medical conditions she has (namely anemia and GI bleeding which she is being treated for this hospitalization.)  She was able to name 3 of her four children, 2 of whom  but could not remember when or how.     As per medical team who obtained collateral from family, pt has not been going out with friends no does she drive. She also forgets at times that one of her children  and speaks of them as if they were alive.    Pt denied depression denies SI, no anxiety , No AH/VH/paranoia

## 2017-03-29 NOTE — PROGRESS NOTE ADULT - ASSESSMENT
1) Anemia - Guiac negative with stable H/H on labs at [facility] (now Hgb of 8.9).  Discussed with GI.  Patient has had episodes of epistaxis which likely are contributing to her anemia.  Patient has had 2 EGD in the last 1-2 years and GI plans for possible outpatient small bowel capsule.  Iron studies adequate.  Normal ESR and low protein. Follow up as outpatient

## 2017-03-29 NOTE — DISCHARGE NOTE ADULT - NS AS ACTIVITY OBS
Walking-Outdoors allowed/Walking-Indoors allowed/No Heavy lifting/straining/Do not make important decisions/Showering allowed/Do not drive or operate machinery

## 2017-03-29 NOTE — DISCHARGE NOTE ADULT - HAS THE PATIENT RECEIVED THE INFLUENZA VACCINE DURING THIS VISIT
No No/Pt stated she thinks she received vaccine from Dr. virgen's office. Office called to [facility] no answer, message left. No/As per rabia from dr. carvalho office pt received jan 27, 2017.

## 2017-03-29 NOTE — CONSULT NOTE ADULT - ASSESSMENT
84 year old female with dementia, MMSE 25/30, admitted for anemia and GI bleeding, now unaware of why she is in the hospital, has been refusing rehab and home health aids. Pt does not have capacity to  make the decision not to go to rehab as pt is not aware of her medical condition and is unable to participate in meaningful discussion of her care due to dementia.

## 2017-03-29 NOTE — DISCHARGE NOTE ADULT - CARE PLAN
Principal Discharge DX:	Anemia, unspecified type  Goal:	symptom free  Instructions for follow-up, activity and diet:	f/u with your PCP for a rpt CBC in 3-5 days  f/u with Dr Miller in 1 week for a possible pill cam to check for possible source of bleed  Secondary Diagnosis:	Depression, unspecified depression type  Goal:	prevent worsening  Instructions for follow-up, activity and diet:	continue escitalopram  Secondary Diagnosis:	Secondary hypertension  Goal:	prevent worsening  Instructions for follow-up, activity and diet:	continue current antihypertensive regimen  Secondary Diagnosis:	Hypothyroidism, unspecified type  Goal:	prevent worsening  Instructions for follow-up, activity and diet:	continue synthroid

## 2017-03-29 NOTE — DISCHARGE NOTE ADULT - HOSPITAL COURSE
Pt is an 83 y/o woman with a h/o anemia,  GI bleeding, CABGx1, cardiac ablation, PM,  who presents with symptomatic anemia. H/H stable since admission- no signs of bleeding. Plan is for outpatient pill cam to check fo source of bleed since 2 previous EGD's has been negative.   Patient is medically optimized for discharge home with assistance.

## 2017-03-29 NOTE — DISCHARGE NOTE ADULT - CARE PROVIDERS DIRECT ADDRESSES
,DirectAddress_Unknown,[email or web address],DirectAddress_Unknown

## 2017-03-29 NOTE — PROGRESS NOTE ADULT - SUBJECTIVE AND OBJECTIVE BOX
HPI:  Pt is an 83 y/o woman with a h/o anemia,  GI bleeding, CABGx1, cardiac ablation, PM,  who presents with anemia.  She was sent by Dr. Carr for symptomatic anemia, with c/o SOB, fatigue, and generalized weakness x2 weeks.  He  reported Hemoglobin dropped from 9.2 - 7.3 on outpt labs.  Pt was recently admitted for GI bleed, with no known source, per son.    Coumadin was discontinued 2 weeks ago due to anemia.       3/28: No complaints. No fever, chills, abd pain, hematuria, hematochezia, melana or recent bleeding.  Denies SOB, CP, dizziness or weakness.  3/29- awake, alert, no overnight events    Review of system- Rest of the review of system are normal except mentioned in HPI    Vital Signs Last 24 Hrs  T(C): 36.7, Max: 36.7 (03-28 @ 21:36)  T(F): 98, Max: 98.1 (03-28 @ 21:36)  HR: 68 (60 - 68)  BP: 99/45 (96/39 - 135/71)  BP(mean): --  RR: 17 (17 - 18)  SpO2: 97% (95% - 98%)    Vital Signs Last 24 Hrs  T(C): 36.6, Max: 36.8 (03-27 @ 17:40)  T(F): 97.9, Max: 98.2 (03-27 @ 17:40)  HR: 60 (60 - 92)  BP: 98/51 (97/49 - 114/64)  BP(mean): --  RR: 17 (15 - 18)  SpO2: 97% (96% - 100%)    PHYSICAL EXAM:    Constitutional: NAD, awake and alert, well-developed  HEENT: PERR, EOMI, Normal Hearing, MMM  Neck: Soft and supple, No LAD, No JVD  Respiratory: Breath sounds are clear bilaterally, No wheezing, rales or rhonchi  Cardiovascular: S1 and S2, regular rate and rhythm, no Murmurs, gallops or rubs  Gastrointestinal: Bowel Sounds present, soft, nontender, nondistended, no guarding, no rebound  Extremities: No peripheral edema  Vascular: 2+ peripheral pulses  Neurological: A/O x 3, no focal deficits  Musculoskeletal: 5/5 strength b/l upper and lower extremities  Skin: No rashes      Allergies  amoxicillin (Rash)  Daypro (Other)  Keflex (Rash)  Tikosyn (Unknown)    Intolerances    MEDICATIONS  (STANDING):  levothyroxine 88MICROGram(s) Oral daily  pantoprazole    Tablet 40milliGRAM(s) Oral before breakfast  heparin  Injectable 5000Unit(s) SubCutaneous every 12 hours  losartan 25milliGRAM(s) Oral daily  donepezil 10milliGRAM(s) Oral at bedtime  memantine 5milliGRAM(s) Oral daily  gabapentin 100milliGRAM(s) Oral two times a day  ATENolol  Tablet 50milliGRAM(s) Oral daily  furosemide    Tablet 40milliGRAM(s) Oral daily  escitalopram 10milliGRAM(s) Oral daily    MEDICATIONS  (PRN):  acetaminophen   Tablet. 650milliGRAM(s) Oral every 6 hours PRN Mild Pain (1 - 3)  docusate sodium 100milliGRAM(s) Oral daily PRN Constipation  ALPRAZolam 0.25milliGRAM(s) Oral at bedtime PRN insomnia                            8.9    5.3   )-----------( 176      ( 29 Mar 2017 06:44 )             28.5     28 Mar 2017 06:46    146    |  109    |  13     ----------------------------<  88     4.0     |  27     |  0.98     Ca    8.2        28 Mar 2017 06:46    TPro  4.8    /  Alb  x      /  TBili  x      /  DBili  x      /  AST  x      /  ALT  x      /  AlkPhos  x      28 Mar 2017 11:54               8.8    x     )-----------( x        ( 28 Mar 2017 06:46 )             27.6     28 Mar 2017 06:46    146    |  109    |  13     ----------------------------<  88     4.0     |  27     |  0.98     Ca    8.2        28 Mar 2017 06:46    TPro  5.6    /  Alb  2.6    /  TBili  0.5    /  DBili  x      /  AST  15     /  ALT  12     /  AlkPhos  103    27 Mar 2017 13:49    CAPILLARY BLOOD GLUCOSE    LIVER FUNCTIONS - ( 27 Mar 2017 13:49 )  Alb: 2.6 g/dL / Pro: 5.6 gm/dL / ALK PHOS: 103 U/L / ALT: 12 U/L / AST: 15 U/L / GGT: x           PT/INR - ( 27 Mar 2017 13:49 )   PT: 12.6 sec;   INR: 1.16 ratio         PTT - ( 27 Mar 2017 13:49 )  PTT:33.0 sec    Assessment and Plan:   Assessment:  · Assessment		    Pt is an 83 y/o woman with a h/o anemia,  GI bleeding, CABGx1, cardiac ablation, PM,  who presents with symptomatic anemia.     Pt is admitted w/    I. Symptomatic anemia  - s/p 1 unit PRBC, h/h stable  - O2 support  - anemia studies pending  - heme evaluation appreciated  - stool guiaic negative  - outpatient pill cam study with Dr. Rosario.    II. Afib,  High Chads score, off coumadin due to GI bleed and increasing anemia  - rate controlled  - cont B Blocker    III. CAD  - cont B Blocker, ACE    IV. DVT prophylaxis, no heparin due to anemia  - SCDs      Psych consult for capacity. As per Dr Santoyo- patient has no capacity. HPI:  Pt is an 85 y/o woman with a h/o anemia,  GI bleeding, CABGx1, cardiac ablation, PM,  who presents with anemia.  She was sent by Dr. Carr for symptomatic anemia, with c/o SOB, fatigue, and generalized weakness x2 weeks.  He  reported Hemoglobin dropped from 9.2 - 7.3 on outpt labs.  Pt was recently admitted for GI bleed, with no known source, per son.    Coumadin was discontinued 2 weeks ago due to anemia.       3/28: No complaints. No fever, chills, abd pain, hematuria, hematochezia, melana or recent bleeding.  Denies SOB, CP, dizziness or weakness.  3/29- awake, alert, no overnight events.  Pleasant but confused.  Pt found to confabulate.     Review of system- Rest of the review of system are normal except mentioned in HPI    Vital Signs Last 24 Hrs  T(C): 36.7, Max: 36.7 (03-28 @ 21:36)  T(F): 98, Max: 98.1 (03-28 @ 21:36)  HR: 68 (60 - 68)  BP: 99/45 (96/39 - 135/71)  BP(mean): --  RR: 17 (17 - 18)  SpO2: 97% (95% - 98%)    PHYSICAL EXAM:    Constitutional: NAD, awake and alert, well-developed  HEENT: PERR, EOMI, Normal Hearing, MMM  Neck: Soft and supple, No LAD, No JVD  Respiratory: Breath sounds are clear bilaterally, No wheezing, rales or rhonchi  Cardiovascular: S1 and S2, regular rate and rhythm, no Murmurs, gallops or rubs  Gastrointestinal: Bowel Sounds present, soft, nontender, nondistended, no guarding, no rebound  Extremities: No peripheral edema  Vascular: 2+ peripheral pulses  Neurological: A/O x 3, no focal deficits  Musculoskeletal: 5/5 strength b/l upper and lower extremities  Skin: No rashes      Allergies  amoxicillin (Rash)  Daypro (Other)  Keflex (Rash)  Tikosyn (Unknown)    Intolerances    MEDICATIONS  (STANDING):  levothyroxine 88MICROGram(s) Oral daily  pantoprazole    Tablet 40milliGRAM(s) Oral before breakfast  losartan 25milliGRAM(s) Oral daily  donepezil 10milliGRAM(s) Oral at bedtime  memantine 5milliGRAM(s) Oral daily  gabapentin 100milliGRAM(s) Oral two times a day  ATENolol  Tablet 50milliGRAM(s) Oral daily  furosemide    Tablet 40milliGRAM(s) Oral daily  escitalopram 10milliGRAM(s) Oral daily    MEDICATIONS  (PRN):  acetaminophen   Tablet. 650milliGRAM(s) Oral every 6 hours PRN Mild Pain (1 - 3)  docusate sodium 100milliGRAM(s) Oral daily PRN Constipation  ALPRAZolam 0.25milliGRAM(s) Oral at bedtime PRN insomnia                                    8.9    5.3   )-----------( 176      ( 29 Mar 2017 06:44 )             28.5     28 Mar 2017 06:46    146    |  109    |  13     ----------------------------<  88     4.0     |  27     |  0.98     Ca    8.2        28 Mar 2017 06:46    TPro  4.8    /  Alb  2.6    /  TBili  x      /  DBili  x      /  AST  x      /  ALT  x      /  AlkPhos  x      28 Mar 2017 11:54    CAPILLARY BLOOD GLUCOSE    LIVER FUNCTIONS - ( 28 Mar 2017 11:54 )  Alb: 2.6 g/dL / Pro: 4.8 g/dL / ALK PHOS: x     / ALT: x     / AST: x     / GGT: x                     Assessment and Plan:   Assessment:  · Assessment		    Pt is an 85 y/o woman with a h/o anemia,  GI bleeding, CABGx1, cardiac ablation, PM,  who presents with symptomatic anemia.     Pt is admitted w/    I. Symptomatic anemia: RESOLVED.  - s/p 1 unit PRBC, h/h stable  - O2 support  - anemia studies noted  - heme evaluation appreciated - outpatient follow up.  - stool guiaic negative  - outpatient pill cam study with Dr. Rosario.    II. Afib,  High Chads score, off coumadin due to GI bleed and increasing anemia  - rate controlled  - cont B Blocker  - no anticoagulation at this time.  Re-evaluate as outpatient.    III. CAD  - cont B Blocker, ACE    IV. DVT prophylaxis, no heparin due to anemia  - SCDs      Psych consult for capacity. As per Dr Santoyo- patient has no capacity to make her own medical decisions.  Pt to be discharged home.  Per social work family to be there to take care of her. HPI:  Pt is an 85 y/o woman with a h/o anemia,  GI bleeding, CABGx1, cardiac ablation, PM,  who presents with anemia.  She was sent by Dr. Carr for symptomatic anemia, with c/o SOB, fatigue, and generalized weakness x2 weeks.  He  reported Hemoglobin dropped from 9.2 - 7.3 on outpt labs.  Pt was recently admitted for GI bleed, with no known source, per son.    Coumadin was discontinued 2 weeks ago due to anemia.       3/28: No complaints. No fever, chills, abd pain, hematuria, hematochezia, melana or recent bleeding.  Denies SOB, CP, dizziness or weakness.  3/29- awake, alert, no overnight events.  Pleasant but confused.  Pt found to confabulate.     Review of system- Rest of the review of system are normal except mentioned in HPI    Vital Signs Last 24 Hrs  T(C): 36.7, Max: 36.7 (03-28 @ 21:36)  T(F): 98, Max: 98.1 (03-28 @ 21:36)  HR: 68 (60 - 68)  BP: 99/45 (96/39 - 135/71)  BP(mean): --  RR: 17 (17 - 18)  SpO2: 97% (95% - 98%)    PHYSICAL EXAM:    Constitutional: NAD, awake and alert, well-developed  HEENT: PERR, EOMI, Normal Hearing, MMM  Neck: Soft and supple, No LAD, No JVD  Respiratory: Breath sounds are clear bilaterally, No wheezing, rales or rhonchi  Cardiovascular: S1 and S2, regular rate and rhythm, no Murmurs, gallops or rubs  Gastrointestinal: Bowel Sounds present, soft, nontender, nondistended, no guarding, no rebound  Extremities: No peripheral edema  Vascular: 2+ peripheral pulses  Neurological: A/O x 3, no focal deficits  Musculoskeletal: 5/5 strength b/l upper and lower extremities  Skin: No rashes      Allergies  amoxicillin (Rash)  Daypro (Other)  Keflex (Rash)  Tikosyn (Unknown)    Intolerances    MEDICATIONS  (STANDING):  levothyroxine 88MICROGram(s) Oral daily  pantoprazole    Tablet 40milliGRAM(s) Oral before breakfast  losartan 25milliGRAM(s) Oral daily  donepezil 10milliGRAM(s) Oral at bedtime  memantine 5milliGRAM(s) Oral daily  gabapentin 100milliGRAM(s) Oral two times a day  ATENolol  Tablet 50milliGRAM(s) Oral daily  furosemide    Tablet 40milliGRAM(s) Oral daily  escitalopram 10milliGRAM(s) Oral daily    MEDICATIONS  (PRN):  acetaminophen   Tablet. 650milliGRAM(s) Oral every 6 hours PRN Mild Pain (1 - 3)  docusate sodium 100milliGRAM(s) Oral daily PRN Constipation  ALPRAZolam 0.25milliGRAM(s) Oral at bedtime PRN insomnia                                    8.9    5.3   )-----------( 176      ( 29 Mar 2017 06:44 )             28.5     28 Mar 2017 06:46    146    |  109    |  13     ----------------------------<  88     4.0     |  27     |  0.98     Ca    8.2        28 Mar 2017 06:46    TPro  4.8    /  Alb  2.6    /  TBili  x      /  DBili  x      /  AST  x      /  ALT  x      /  AlkPhos  x      28 Mar 2017 11:54    CAPILLARY BLOOD GLUCOSE    LIVER FUNCTIONS - ( 28 Mar 2017 11:54 )  Alb: 2.6 g/dL / Pro: 4.8 g/dL / ALK PHOS: x     / ALT: x     / AST: x     / GGT: x                     Assessment and Plan:   Assessment:  · Assessment		    Pt is an 85 y/o woman with a h/o anemia,  GI bleeding, CABGx1, cardiac ablation, PM,  who presents with symptomatic anemia.     Pt is admitted w/    I. Symptomatic anemia: RESOLVED.  - s/p 1 unit PRBC, h/h stable  - O2 support  - anemia studies noted  - heme evaluation appreciated - outpatient follow up.  - stool guiaic negative  - outpatient pill cam study with Dr. Rosario.    II. Afib,  High Chads score, off coumadin due to GI bleed and increasing anemia  - rate controlled  - cont B Blocker  - no anticoagulation at this time.  Re-evaluate as outpatient.    III. CAD  - cont B Blocker, ACE    IV. DVT prophylaxis, no heparin due to anemia  - SCDs      Psych consult for capacity. As per Dr Santoyo- patient has no capacity to make her own medical decisions.  Pt to be discharged home.  Per social work family to be there to take care of her.    Attending Statement:  >30 minutes spent on total encounter and discharge; more than 50% of the visit was spent counseling and/or coordinating care by the attending physician.

## 2017-03-29 NOTE — DISCHARGE NOTE ADULT - PATIENT PORTAL LINK FT
“You can access the [facility] Patient Portal, offered by [facility], by registering with the following website: [email or web address]”

## 2017-03-29 NOTE — DISCHARGE NOTE ADULT - CARE PROVIDER_API CALL
Boubacar Rosario), Gastroenterology; Internal Medicine  [address] [facility] [address] [place] [address]  Phone: [phone]  Fax: [phone]    Keyla Sifuentes), Internal Medicine  [address] [place] [address]  Phone: [phone]  Fax: [phone]

## 2017-03-31 DIAGNOSIS — Z95.2 PRESENCE OF PROSTHETIC HEART VALVE: ICD-10-CM

## 2017-03-31 DIAGNOSIS — K21.9 GASTRO-ESOPHAGEAL REFLUX DISEASE WITHOUT ESOPHAGITIS: ICD-10-CM

## 2017-03-31 DIAGNOSIS — E78.5 HYPERLIPIDEMIA, UNSPECIFIED: ICD-10-CM

## 2017-03-31 DIAGNOSIS — K64.9 UNSPECIFIED HEMORRHOIDS: ICD-10-CM

## 2017-03-31 DIAGNOSIS — I25.10 ATHEROSCLEROTIC HEART DISEASE OF NATIVE CORONARY ARTERY WITHOUT ANGINA PECTORIS: ICD-10-CM

## 2017-03-31 DIAGNOSIS — I10 ESSENTIAL (PRIMARY) HYPERTENSION: ICD-10-CM

## 2017-03-31 DIAGNOSIS — F32.9 MAJOR DEPRESSIVE DISORDER, SINGLE EPISODE, UNSPECIFIED: ICD-10-CM

## 2017-03-31 DIAGNOSIS — Z79.82 LONG TERM (CURRENT) USE OF ASPIRIN: ICD-10-CM

## 2017-03-31 DIAGNOSIS — D64.9 ANEMIA, UNSPECIFIED: ICD-10-CM

## 2017-03-31 DIAGNOSIS — M47.9 SPONDYLOSIS, UNSPECIFIED: ICD-10-CM

## 2017-03-31 DIAGNOSIS — Z79.01 LONG TERM (CURRENT) USE OF ANTICOAGULANTS: ICD-10-CM

## 2017-03-31 DIAGNOSIS — I48.91 UNSPECIFIED ATRIAL FIBRILLATION: ICD-10-CM

## 2017-03-31 DIAGNOSIS — Z95.1 PRESENCE OF AORTOCORONARY BYPASS GRAFT: ICD-10-CM

## 2017-03-31 DIAGNOSIS — E03.9 HYPOTHYROIDISM, UNSPECIFIED: ICD-10-CM

## 2017-03-31 DIAGNOSIS — Z86.74 PERSONAL HISTORY OF SUDDEN CARDIAC ARREST: ICD-10-CM

## 2017-03-31 DIAGNOSIS — Z95.0 PRESENCE OF CARDIAC PACEMAKER: ICD-10-CM

## 2017-04-11 ENCOUNTER — APPOINTMENT (OUTPATIENT)
Dept: ELECTROPHYSIOLOGY | Facility: CLINIC | Age: 82
End: 2017-04-11

## 2017-07-18 ENCOUNTER — APPOINTMENT (OUTPATIENT)
Dept: ELECTROPHYSIOLOGY | Facility: CLINIC | Age: 82
End: 2017-07-18

## 2017-10-17 ENCOUNTER — APPOINTMENT (OUTPATIENT)
Dept: ELECTROPHYSIOLOGY | Facility: CLINIC | Age: 82
End: 2017-10-17
Payer: MEDICARE

## 2017-10-17 PROCEDURE — 93296 REM INTERROG EVL PM/IDS: CPT

## 2017-10-17 PROCEDURE — 93294 REM INTERROG EVL PM/LDLS PM: CPT

## 2018-01-12 ENCOUNTER — APPOINTMENT (OUTPATIENT)
Dept: ELECTROPHYSIOLOGY | Facility: CLINIC | Age: 83
End: 2018-01-12
Payer: MEDICARE

## 2018-01-12 DIAGNOSIS — I49.5 SICK SINUS SYNDROME: ICD-10-CM

## 2018-01-12 PROCEDURE — 93279 PRGRMG DEV EVAL PM/LDLS PM: CPT

## 2018-02-21 ENCOUNTER — INPATIENT (INPATIENT)
Facility: HOSPITAL | Age: 83
LOS: 4 days | Discharge: NURSING, REHAB OR LONG-TERM CARE | End: 2018-02-26
Attending: INTERNAL MEDICINE | Admitting: INTERNAL MEDICINE
Payer: MEDICARE

## 2018-02-21 VITALS
TEMPERATURE: 98 F | OXYGEN SATURATION: 100 % | RESPIRATION RATE: 16 BRPM | HEIGHT: 63 IN | WEIGHT: 149.91 LBS | SYSTOLIC BLOOD PRESSURE: 119 MMHG | HEART RATE: 84 BPM | DIASTOLIC BLOOD PRESSURE: 75 MMHG

## 2018-02-21 DIAGNOSIS — Z95.0 PRESENCE OF CARDIAC PACEMAKER: ICD-10-CM

## 2018-02-21 DIAGNOSIS — I48.2 CHRONIC ATRIAL FIBRILLATION: ICD-10-CM
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
## 2018-02-21 DIAGNOSIS — S32.9XXA FRACTURE OF UNSPECIFIED PARTS OF LUMBOSACRAL SPINE AND PELVIS, INITIAL ENCOUNTER FOR CLOSED FRACTURE: ICD-10-CM

## 2018-02-21 DIAGNOSIS — D64.9 ANEMIA, UNSPECIFIED: ICD-10-CM

## 2018-02-21 DIAGNOSIS — I25.810 ATHEROSCLEROSIS OF CORONARY ARTERY BYPASS GRAFT(S) WITHOUT ANGINA PECTORIS: ICD-10-CM

## 2018-02-21 LAB
ADD ON TEST-SPECIMEN IN LAB: SIGNIFICANT CHANGE UP
ALBUMIN SERPL ELPH-MCNC: 4.1 G/DL — SIGNIFICANT CHANGE UP (ref 3.3–5)
ALP SERPL-CCNC: 177 U/L — HIGH (ref 40–120)
ALT FLD-CCNC: 24 U/L — SIGNIFICANT CHANGE UP (ref 12–78)
ANION GAP SERPL CALC-SCNC: 6 MMOL/L — SIGNIFICANT CHANGE UP (ref 5–17)
APPEARANCE UR: (no result)
APTT BLD: 32.4 SEC — SIGNIFICANT CHANGE UP (ref 27.5–37.4)
AST SERPL-CCNC: 30 U/L — SIGNIFICANT CHANGE UP (ref 15–37)
BACTERIA # UR AUTO: (no result)
BASOPHILS # BLD AUTO: 0.1 K/UL — SIGNIFICANT CHANGE UP (ref 0–0.2)
BASOPHILS NFR BLD AUTO: 1.5 % — SIGNIFICANT CHANGE UP (ref 0–2)
BILIRUB SERPL-MCNC: 1.1 MG/DL — SIGNIFICANT CHANGE UP (ref 0.2–1.2)
BILIRUB UR-MCNC: NEGATIVE — SIGNIFICANT CHANGE UP
BUN SERPL-MCNC: 22 MG/DL — SIGNIFICANT CHANGE UP (ref 7–23)
CALCIUM SERPL-MCNC: 8.8 MG/DL — SIGNIFICANT CHANGE UP (ref 8.5–10.1)
CHLORIDE SERPL-SCNC: 101 MMOL/L — SIGNIFICANT CHANGE UP (ref 96–108)
CK SERPL-CCNC: 141 U/L — SIGNIFICANT CHANGE UP (ref 26–192)
CO2 SERPL-SCNC: 29 MMOL/L — SIGNIFICANT CHANGE UP (ref 22–31)
COLOR SPEC: (no result)
COMMENT - URINE: SIGNIFICANT CHANGE UP
CREAT SERPL-MCNC: 1.44 MG/DL — HIGH (ref 0.5–1.3)
DIFF PNL FLD: NEGATIVE — SIGNIFICANT CHANGE UP
EOSINOPHIL # BLD AUTO: 0.1 K/UL — SIGNIFICANT CHANGE UP (ref 0–0.5)
EOSINOPHIL NFR BLD AUTO: 1.1 % — SIGNIFICANT CHANGE UP (ref 0–6)
EPI CELLS # UR: SIGNIFICANT CHANGE UP
GLUCOSE BLDC GLUCOMTR-MCNC: 102 MG/DL — HIGH (ref 70–99)
GLUCOSE SERPL-MCNC: 90 MG/DL — SIGNIFICANT CHANGE UP (ref 70–99)
GLUCOSE UR QL: NEGATIVE MG/DL — SIGNIFICANT CHANGE UP
HCT VFR BLD CALC: 43.2 % — SIGNIFICANT CHANGE UP (ref 34.5–45)
HGB BLD-MCNC: 13.5 G/DL — SIGNIFICANT CHANGE UP (ref 11.5–15.5)
INR BLD: 1.11 RATIO — SIGNIFICANT CHANGE UP (ref 0.88–1.16)
KETONES UR-MCNC: (no result)
LEUKOCYTE ESTERASE UR-ACNC: (no result)
LYMPHOCYTES # BLD AUTO: 0.9 K/UL — LOW (ref 1–3.3)
LYMPHOCYTES # BLD AUTO: 13.2 % — SIGNIFICANT CHANGE UP (ref 13–44)
MAGNESIUM SERPL-MCNC: 2 MG/DL — SIGNIFICANT CHANGE UP (ref 1.6–2.6)
MCHC RBC-ENTMCNC: 31.4 GM/DL — LOW (ref 32–36)
MCHC RBC-ENTMCNC: 31.8 PG — SIGNIFICANT CHANGE UP (ref 27–34)
MCV RBC AUTO: 101.4 FL — HIGH (ref 80–100)
MONOCYTES # BLD AUTO: 0.6 K/UL — SIGNIFICANT CHANGE UP (ref 0–0.9)
MONOCYTES NFR BLD AUTO: 8.2 % — SIGNIFICANT CHANGE UP (ref 2–14)
NEUTROPHILS # BLD AUTO: 5.4 K/UL — SIGNIFICANT CHANGE UP (ref 1.8–7.4)
NEUTROPHILS NFR BLD AUTO: 75.9 % — SIGNIFICANT CHANGE UP (ref 43–77)
NITRITE UR-MCNC: NEGATIVE — SIGNIFICANT CHANGE UP
PH UR: 5 — SIGNIFICANT CHANGE UP (ref 5–8)
PLATELET # BLD AUTO: 103 K/UL — LOW (ref 150–400)
POTASSIUM SERPL-MCNC: 3.9 MMOL/L — SIGNIFICANT CHANGE UP (ref 3.5–5.3)
POTASSIUM SERPL-SCNC: 3.9 MMOL/L — SIGNIFICANT CHANGE UP (ref 3.5–5.3)
PROT SERPL-MCNC: 7.7 GM/DL — SIGNIFICANT CHANGE UP (ref 6–8.3)
PROT UR-MCNC: 100 MG/DL
PROTHROM AB SERPL-ACNC: 12 SEC — SIGNIFICANT CHANGE UP (ref 9.8–12.7)
RBC # BLD: 4.26 M/UL — SIGNIFICANT CHANGE UP (ref 3.8–5.2)
RBC # FLD: 13.8 % — SIGNIFICANT CHANGE UP (ref 10.3–14.5)
RBC CASTS # UR COMP ASSIST: NEGATIVE /HPF — SIGNIFICANT CHANGE UP (ref 0–4)
SODIUM SERPL-SCNC: 136 MMOL/L — SIGNIFICANT CHANGE UP (ref 135–145)
SP GR SPEC: 1.01 — SIGNIFICANT CHANGE UP (ref 1.01–1.02)
TROPONIN I SERPL-MCNC: <0.015 NG/ML — SIGNIFICANT CHANGE UP (ref 0.01–0.04)
UROBILINOGEN FLD QL: 1 MG/DL
WBC # BLD: 7.1 K/UL — SIGNIFICANT CHANGE UP (ref 3.8–10.5)
WBC # FLD AUTO: 7.1 K/UL — SIGNIFICANT CHANGE UP (ref 3.8–10.5)
WBC UR QL: SIGNIFICANT CHANGE UP

## 2018-02-21 PROCEDURE — 70450 CT HEAD/BRAIN W/O DYE: CPT | Mod: 26

## 2018-02-21 PROCEDURE — 93010 ELECTROCARDIOGRAM REPORT: CPT

## 2018-02-21 PROCEDURE — 73501 X-RAY EXAM HIP UNI 1 VIEW: CPT | Mod: 26

## 2018-02-21 PROCEDURE — 71045 X-RAY EXAM CHEST 1 VIEW: CPT | Mod: 26

## 2018-02-21 PROCEDURE — 99285 EMERGENCY DEPT VISIT HI MDM: CPT

## 2018-02-21 RX ORDER — PANTOPRAZOLE SODIUM 20 MG/1
40 TABLET, DELAYED RELEASE ORAL
Qty: 0 | Refills: 0 | Status: DISCONTINUED | OUTPATIENT
Start: 2018-02-21 | End: 2018-02-26

## 2018-02-21 RX ORDER — DONEPEZIL HYDROCHLORIDE 10 MG/1
10 TABLET, FILM COATED ORAL AT BEDTIME
Qty: 0 | Refills: 0 | Status: DISCONTINUED | OUTPATIENT
Start: 2018-02-21 | End: 2018-02-26

## 2018-02-21 RX ORDER — FERROUS SULFATE 325(65) MG
325 TABLET ORAL DAILY
Qty: 0 | Refills: 0 | Status: DISCONTINUED | OUTPATIENT
Start: 2018-02-21 | End: 2018-02-26

## 2018-02-21 RX ORDER — ESCITALOPRAM OXALATE 10 MG/1
10 TABLET, FILM COATED ORAL DAILY
Qty: 0 | Refills: 0 | Status: DISCONTINUED | OUTPATIENT
Start: 2018-02-21 | End: 2018-02-26

## 2018-02-21 RX ORDER — SODIUM CHLORIDE 9 MG/ML
3 INJECTION INTRAMUSCULAR; INTRAVENOUS; SUBCUTANEOUS EVERY 8 HOURS
Qty: 0 | Refills: 0 | Status: DISCONTINUED | OUTPATIENT
Start: 2018-02-21 | End: 2018-02-26

## 2018-02-21 RX ORDER — OXYCODONE HYDROCHLORIDE 5 MG/1
10 TABLET ORAL THREE TIMES A DAY
Qty: 0 | Refills: 0 | Status: DISCONTINUED | OUTPATIENT
Start: 2018-02-21 | End: 2018-02-26

## 2018-02-21 RX ORDER — GABAPENTIN 400 MG/1
100 CAPSULE ORAL
Qty: 0 | Refills: 0 | Status: DISCONTINUED | OUTPATIENT
Start: 2018-02-21 | End: 2018-02-26

## 2018-02-21 RX ORDER — OXYCODONE HYDROCHLORIDE 5 MG/1
5 TABLET ORAL EVERY 4 HOURS
Qty: 0 | Refills: 0 | Status: DISCONTINUED | OUTPATIENT
Start: 2018-02-21 | End: 2018-02-21

## 2018-02-21 RX ORDER — MORPHINE SULFATE 50 MG/1
2 CAPSULE, EXTENDED RELEASE ORAL ONCE
Qty: 0 | Refills: 0 | Status: DISCONTINUED | OUTPATIENT
Start: 2018-02-21 | End: 2018-02-21

## 2018-02-21 RX ORDER — ACETAMINOPHEN 500 MG
650 TABLET ORAL EVERY 6 HOURS
Qty: 0 | Refills: 0 | Status: DISCONTINUED | OUTPATIENT
Start: 2018-02-21 | End: 2018-02-26

## 2018-02-21 RX ORDER — ENOXAPARIN SODIUM 100 MG/ML
30 INJECTION SUBCUTANEOUS DAILY
Qty: 0 | Refills: 0 | Status: DISCONTINUED | OUTPATIENT
Start: 2018-02-22 | End: 2018-02-26

## 2018-02-21 RX ORDER — ATENOLOL 25 MG/1
50 TABLET ORAL DAILY
Qty: 0 | Refills: 0 | Status: DISCONTINUED | OUTPATIENT
Start: 2018-02-21 | End: 2018-02-22

## 2018-02-21 RX ORDER — FERROUS SULFATE 325(65) MG
325 TABLET ORAL DAILY
Qty: 0 | Refills: 0 | Status: DISCONTINUED | OUTPATIENT
Start: 2018-02-21 | End: 2018-02-21

## 2018-02-21 RX ORDER — ASCORBIC ACID 60 MG
1 TABLET,CHEWABLE ORAL
Qty: 0 | Refills: 0 | COMMUNITY

## 2018-02-21 RX ORDER — FOLIC ACID 0.8 MG
1 TABLET ORAL
Qty: 0 | Refills: 0 | COMMUNITY

## 2018-02-21 RX ORDER — CALCIUM CARBONATE 500(1250)
1 TABLET ORAL DAILY
Qty: 0 | Refills: 0 | Status: DISCONTINUED | OUTPATIENT
Start: 2018-02-21 | End: 2018-02-26

## 2018-02-21 RX ORDER — FUROSEMIDE 40 MG
20 TABLET ORAL DAILY
Qty: 0 | Refills: 0 | Status: DISCONTINUED | OUTPATIENT
Start: 2018-02-21 | End: 2018-02-22

## 2018-02-21 RX ORDER — LEVOTHYROXINE SODIUM 125 MCG
88 TABLET ORAL DAILY
Qty: 0 | Refills: 0 | Status: DISCONTINUED | OUTPATIENT
Start: 2018-02-21 | End: 2018-02-26

## 2018-02-21 RX ORDER — CHOLECALCIFEROL (VITAMIN D3) 125 MCG
1 CAPSULE ORAL
Qty: 0 | Refills: 0 | COMMUNITY

## 2018-02-21 RX ORDER — MEMANTINE HYDROCHLORIDE 10 MG/1
5 TABLET ORAL DAILY
Qty: 0 | Refills: 0 | Status: DISCONTINUED | OUTPATIENT
Start: 2018-02-21 | End: 2018-02-26

## 2018-02-21 RX ORDER — LOSARTAN POTASSIUM 100 MG/1
25 TABLET, FILM COATED ORAL DAILY
Qty: 0 | Refills: 0 | Status: DISCONTINUED | OUTPATIENT
Start: 2018-02-21 | End: 2018-02-22

## 2018-02-21 RX ORDER — CALCIUM CARBONATE 500(1250)
1 TABLET ORAL DAILY
Qty: 0 | Refills: 0 | Status: DISCONTINUED | OUTPATIENT
Start: 2018-02-21 | End: 2018-02-21

## 2018-02-21 RX ORDER — ONDANSETRON 8 MG/1
4 TABLET, FILM COATED ORAL EVERY 6 HOURS
Qty: 0 | Refills: 0 | Status: DISCONTINUED | OUTPATIENT
Start: 2018-02-21 | End: 2018-02-26

## 2018-02-21 RX ORDER — ACETAMINOPHEN 500 MG
1000 TABLET ORAL ONCE
Qty: 0 | Refills: 0 | Status: COMPLETED | OUTPATIENT
Start: 2018-02-21 | End: 2018-02-21

## 2018-02-21 RX ORDER — PREGABALIN 225 MG/1
1 CAPSULE ORAL
Qty: 0 | Refills: 0 | COMMUNITY

## 2018-02-21 RX ADMIN — Medication 1000 MILLIGRAM(S): at 11:45

## 2018-02-21 RX ADMIN — SODIUM CHLORIDE 3 MILLILITER(S): 9 INJECTION INTRAMUSCULAR; INTRAVENOUS; SUBCUTANEOUS at 22:05

## 2018-02-21 RX ADMIN — Medication 1000 MILLIGRAM(S): at 10:30

## 2018-02-21 RX ADMIN — Medication 20 MILLIGRAM(S): at 18:30

## 2018-02-21 RX ADMIN — MORPHINE SULFATE 2 MILLIGRAM(S): 50 CAPSULE, EXTENDED RELEASE ORAL at 11:36

## 2018-02-21 RX ADMIN — OXYCODONE HYDROCHLORIDE 10 MILLIGRAM(S): 5 TABLET ORAL at 18:27

## 2018-02-21 RX ADMIN — DONEPEZIL HYDROCHLORIDE 10 MILLIGRAM(S): 10 TABLET, FILM COATED ORAL at 23:38

## 2018-02-21 RX ADMIN — OXYCODONE HYDROCHLORIDE 10 MILLIGRAM(S): 5 TABLET ORAL at 17:57

## 2018-02-21 RX ADMIN — LOSARTAN POTASSIUM 25 MILLIGRAM(S): 100 TABLET, FILM COATED ORAL at 18:30

## 2018-02-21 RX ADMIN — GABAPENTIN 100 MILLIGRAM(S): 400 CAPSULE ORAL at 18:30

## 2018-02-21 RX ADMIN — MORPHINE SULFATE 2 MILLIGRAM(S): 50 CAPSULE, EXTENDED RELEASE ORAL at 17:30

## 2018-02-21 NOTE — PATIENT PROFILE ADULT. - PSH
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 [place]  pacemaker  LACW 2010 Medtronic ADDROI/[id number]  partial thyroidectomy  1962  Spinal Disorder  spinal stimulator trial 2010 - then removed

## 2018-02-21 NOTE — ED PROVIDER NOTE - OBJECTIVE STATEMENT
pt found down on floor by son this morning.  pt cannot say what happened.  son states that although she cannot remember what happened she appears normal to him right now.  no recent illness or increased confusion.  pt c/o pain to L groin

## 2018-02-21 NOTE — H&P ADULT - PMH
Anemia    Atrial Fibrillation  2007  on baby aspirin to maintain as per Dr Fernandez  CAD (Coronary Artery Disease) of Bypass Graft  bypass times 1,  12/2007 [facility] [place]  Cardiac Arrest  2007- lead to CABG and mitral valve repair  Cataract  implants 2007  HTN - Hypertension  1988  Hyperlipidemia    Hypothyroid  last TFT's 2 weeks ago - normal  Mitral valve repair  CAD 12/2007 placed on coumadin to remain as per Dr Fernandez  Osteoarthritis  spine, joints  Pacemaker  LACW 2010 - Medtronic ERENDIRA/ [id number] - [place] [facility]  Spinal Stenosis

## 2018-02-21 NOTE — H&P ADULT - HISTORY OF PRESENT ILLNESS
· Chief Complaint: The patient is a 85y Female complaining of fall.	  · Unable to Obtain: Dementia	  · HPI Objective Statement: pt found down on floor by son this morning.  pt cannot say what happened.  son states that although she cannot remember what happened she appears normal to him right now.  no recent illness or increased confusion.  pt c/o pain to L groin	  Patient is a poor historian, no complaints at the time of exam.  Pain controlled.    Vital Signs Last 24 Hrs  T(C): 37.3 (21 Feb 2018 14:37), Max: 37.3 (21 Feb 2018 14:37)  T(F): 99.1 (21 Feb 2018 14:37), Max: 99.1 (21 Feb 2018 14:37)  HR: 60 (21 Feb 2018 14:15) (60 - 84)  BP: 77/44 (21 Feb 2018 14:15) (77/44 - 119/75)  BP(mean): --  RR: 17 (21 Feb 2018 12:07) (16 - 18)  SpO2: 98% (21 Feb 2018 14:15) (96% - 100%)

## 2018-02-21 NOTE — ED ADULT NURSE NOTE - OBJECTIVE STATEMENT
Pt. c/o of groin pain s/p fall this AM. Pt. has unknown LOC, no blood thinners. Pt. son at bedside reports that his mother called him from the other room to say she fell. Pt. states she did not feel well and does not remember falling. Pt. denies CP, HA, double vision, d/n/v, weakness. Pt. has PMSx4. Pt. is Disoriented to time and situation. Pt. c/o of groin pain s/p fall this AM. Pt. has unknown LOC, denies blood thinners. Pt. son at bedside reports that his mother called him from the other room to say she fell. Pt. states she did not feel well and does not remember falling. Pt. denies CP, HA, double vision, d/n/v, weakness. Pt. has PMSx4. Pt. is Disoriented to time and situation.

## 2018-02-21 NOTE — H&P ADULT - RS GEN PE MLT RESP DETAILS PC
good air movement/no intercostal retractions/no rales/PPM in place/breath sounds equal/airway patent/no wheezes

## 2018-02-21 NOTE — ED PROVIDER NOTE - PMH
Anemia    Atrial Fibrillation  2007  on baby aspirin to maintain as per Dr Fernandez  CAD (Coronary Artery Disease) of Bypass Graft  bypass times 1,  12/2007 [facility] [place]  Cardiac Arrest  2007- lead to CABG and mitral valve repair  Cataract  implants 2007  HTN - Hypertension  1988  Hyperlipidemia    Hypothyroid  last TFT's 2 weeks ago - normal  Mitral valve repair  CAD 12/2007 placed on coumadin to remain as per Dr Fernandez  Osteoarthritis  spine, joints  Pacemaker  LACW 2010 - Medtronic ERENDIRA/ [id number] - [place] [facility]  Spinal Stenosis

## 2018-02-21 NOTE — ED PROVIDER NOTE - PSH
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

## 2018-02-21 NOTE — ED PROVIDER NOTE - ENMT, MLM
NCAT.  Airway patent, Nasal mucosa clear. Mouth with normal mucosa. Throat has no vesicles, no oropharyngeal exudates and uvula is midline.

## 2018-02-21 NOTE — ED ADULT NURSE REASSESSMENT NOTE - NS ED NURSE REASSESS COMMENT FT1
pain improved after morphine administration.  bp dropped to 80's over 40's.  patient awake, alert and oriented, at baseline. dr kay made aware, fluid bolus administered, will monitor for effect

## 2018-02-21 NOTE — H&P ADULT - PSH
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

## 2018-02-21 NOTE — ED PROVIDER NOTE - MUSCULOSKELETAL, MLM
Spine appears normal, range of motion is not limited, no muscle FROM all joints except for L hip, pain with ranging in inner groin

## 2018-02-21 NOTE — ED ADULT NURSE REASSESSMENT NOTE - NS ED NURSE REASSESS COMMENT FT1
Pt. able to tolerate PO intake of 90 ml water, has increased complaints of pain in left groin, MD made aware will order PO tylenol.

## 2018-02-21 NOTE — ED ADULT TRIAGE NOTE - CHIEF COMPLAINT QUOTE
Syncopal episode at home, patient states she was in living room and got dizzy. Cannot recall incident. C/O back pain

## 2018-02-22 LAB
CULTURE RESULTS: NO GROWTH — SIGNIFICANT CHANGE UP
HCT VFR BLD CALC: 36.3 % — SIGNIFICANT CHANGE UP (ref 34.5–45)
HGB BLD-MCNC: 12 G/DL — SIGNIFICANT CHANGE UP (ref 11.5–15.5)
MCHC RBC-ENTMCNC: 33.1 GM/DL — SIGNIFICANT CHANGE UP (ref 32–36)
MCHC RBC-ENTMCNC: 33.1 PG — SIGNIFICANT CHANGE UP (ref 27–34)
MCV RBC AUTO: 99.9 FL — SIGNIFICANT CHANGE UP (ref 80–100)
PLATELET # BLD AUTO: 83 K/UL — LOW (ref 150–400)
RBC # BLD: 3.63 M/UL — LOW (ref 3.8–5.2)
RBC # FLD: 13.9 % — SIGNIFICANT CHANGE UP (ref 10.3–14.5)
SPECIMEN SOURCE: SIGNIFICANT CHANGE UP
WBC # BLD: 4.8 K/UL — SIGNIFICANT CHANGE UP (ref 3.8–10.5)
WBC # FLD AUTO: 4.8 K/UL — SIGNIFICANT CHANGE UP (ref 3.8–10.5)

## 2018-02-22 PROCEDURE — 93279 PRGRMG DEV EVAL PM/LDLS PM: CPT | Mod: 26

## 2018-02-22 PROCEDURE — 71045 X-RAY EXAM CHEST 1 VIEW: CPT | Mod: 26

## 2018-02-22 RX ORDER — SODIUM CHLORIDE 9 MG/ML
1000 INJECTION INTRAMUSCULAR; INTRAVENOUS; SUBCUTANEOUS
Qty: 0 | Refills: 0 | Status: COMPLETED | OUTPATIENT
Start: 2018-02-22 | End: 2018-02-23

## 2018-02-22 RX ADMIN — OXYCODONE HYDROCHLORIDE 10 MILLIGRAM(S): 5 TABLET ORAL at 15:45

## 2018-02-22 RX ADMIN — Medication 325 MILLIGRAM(S): at 11:42

## 2018-02-22 RX ADMIN — OXYCODONE HYDROCHLORIDE 10 MILLIGRAM(S): 5 TABLET ORAL at 08:15

## 2018-02-22 RX ADMIN — DONEPEZIL HYDROCHLORIDE 10 MILLIGRAM(S): 10 TABLET, FILM COATED ORAL at 21:53

## 2018-02-22 RX ADMIN — OXYCODONE HYDROCHLORIDE 10 MILLIGRAM(S): 5 TABLET ORAL at 16:15

## 2018-02-22 RX ADMIN — PANTOPRAZOLE SODIUM 40 MILLIGRAM(S): 20 TABLET, DELAYED RELEASE ORAL at 06:14

## 2018-02-22 RX ADMIN — SODIUM CHLORIDE 3 MILLILITER(S): 9 INJECTION INTRAMUSCULAR; INTRAVENOUS; SUBCUTANEOUS at 21:52

## 2018-02-22 RX ADMIN — SODIUM CHLORIDE 75 MILLILITER(S): 9 INJECTION INTRAMUSCULAR; INTRAVENOUS; SUBCUTANEOUS at 12:15

## 2018-02-22 RX ADMIN — SODIUM CHLORIDE 3 MILLILITER(S): 9 INJECTION INTRAMUSCULAR; INTRAVENOUS; SUBCUTANEOUS at 11:49

## 2018-02-22 RX ADMIN — GABAPENTIN 100 MILLIGRAM(S): 400 CAPSULE ORAL at 06:14

## 2018-02-22 RX ADMIN — Medication 1 TABLET(S): at 11:43

## 2018-02-22 RX ADMIN — ESCITALOPRAM OXALATE 10 MILLIGRAM(S): 10 TABLET, FILM COATED ORAL at 11:42

## 2018-02-22 RX ADMIN — MEMANTINE HYDROCHLORIDE 5 MILLIGRAM(S): 10 TABLET ORAL at 11:46

## 2018-02-22 RX ADMIN — SODIUM CHLORIDE 3 MILLILITER(S): 9 INJECTION INTRAMUSCULAR; INTRAVENOUS; SUBCUTANEOUS at 05:58

## 2018-02-22 RX ADMIN — OXYCODONE HYDROCHLORIDE 10 MILLIGRAM(S): 5 TABLET ORAL at 07:58

## 2018-02-22 RX ADMIN — Medication 1 TABLET(S): at 11:42

## 2018-02-22 RX ADMIN — ENOXAPARIN SODIUM 30 MILLIGRAM(S): 100 INJECTION SUBCUTANEOUS at 11:49

## 2018-02-22 RX ADMIN — Medication 88 MICROGRAM(S): at 06:14

## 2018-02-22 RX ADMIN — GABAPENTIN 100 MILLIGRAM(S): 400 CAPSULE ORAL at 17:29

## 2018-02-22 NOTE — PHYSICAL THERAPY INITIAL EVALUATION ADULT - GENERAL OBSERVATIONS, REHAB EVAL
recumbent in bed rolled toward R side gripping upper SR,IVF infusing LUE,nasal O2,awake,alert,pleasant & cooperative

## 2018-02-22 NOTE — PROVIDER CONTACT NOTE (OTHER) - REASON
Hospitalist consult
Pt desat, Bp's  discrepancies, crackles in lungs
consult for pacemaker interrigation
Consult

## 2018-02-22 NOTE — PROVIDER CONTACT NOTE (OTHER) - SITUATION
Cardiology consult called into Dr. Lujan's group. Will come to assess the patient.
interrigation of PPM s/p fall possible syncope. called consult and will come assess patient
medical managemt  notified of Bp 89/49, 91/47 arm, 130/54 Left leg, 02 sats 89-91%RA, 95% 2L Nc, Lungs w scattered crackles at the bases, pt asymptomatic/denies sob.

## 2018-02-22 NOTE — PROGRESS NOTE ADULT - SUBJECTIVE AND OBJECTIVE BOX
CC:Patient is a 85y old  Female who presents with a chief complaint of I fell at home (21 Feb 2018 17:54)      Subjective:  Pt seen and examined at bedside with chaperone. Pt is awake, alert, cooperative, comfortable, confused with dementia, pt in no acute distress. No reported c/o fever, chills, chest pain, SOB, abd pain, N/V/D, hemoptysis, hematemesis, hematuria, hematochexia, headache, diplopia, vertigo, dizzyness. Pt tolerating diet, (+) void, (+) bowel function, fall risk precautions    ROS:  limited ROS secondary to dementia    Vital Signs Last 24 Hrs  T(C): 37.2 (22 Feb 2018 05:15), Max: 37.5 (21 Feb 2018 16:22)  T(F): 98.9 (22 Feb 2018 05:15), Max: 99.5 (21 Feb 2018 16:22)  HR: 60 (22 Feb 2018 10:17) (60 - 68)  BP: 87/41 (22 Feb 2018 10:17) (77/44 - 139/54)  BP(mean): --  RR: 16 (22 Feb 2018 10:17) (15 - 17)  SpO2: 96% (22 Feb 2018 10:17) (91% - 100%)    Labs:      CARDIAC MARKERS ( 21 Feb 2018 07:27 )  <0.015 ng/mL / x     / 141 U/L / x     / x                                12.0   4.8   )-----------( 83       ( 22 Feb 2018 02:11 )             36.3     CBC Full  -  ( 22 Feb 2018 02:11 )  WBC Count : 4.8 K/uL  Hemoglobin : 12.0 g/dL  Hematocrit : 36.3 %  Platelet Count - Automated : 83 K/uL  Mean Cell Volume : 99.9 fl  Mean Cell Hemoglobin : 33.1 pg  Mean Cell Hemoglobin Concentration : 33.1 gm/dL  Auto Neutrophil # : x  Auto Lymphocyte # : x  Auto Monocyte # : x  Auto Eosinophil # : x  Auto Basophil # : x  Auto Neutrophil % : x  Auto Lymphocyte % : x  Auto Monocyte % : x  Auto Eosinophil % : x  Auto Basophil % : x    02-21    136  |  101  |  22  ----------------------------<  90  3.9   |  29  |  1.44<H>    Ca    8.8      21 Feb 2018 07:27  Mg     2.0     02-21    TPro  7.7  /  Alb  4.1  /  TBili  1.1  /  DBili  x   /  AST  30  /  ALT  24  /  AlkPhos  177<H>  02-21    LIVER FUNCTIONS - ( 21 Feb 2018 07:27 )  Alb: 4.1 g/dL / Pro: 7.7 gm/dL / ALK PHOS: 177 U/L / ALT: 24 U/L / AST: 30 U/L / GGT: x           PT/INR - ( 21 Feb 2018 07:27 )   PT: 12.0 sec;   INR: 1.11 ratio         PTT - ( 21 Feb 2018 07:27 )  PTT:32.4 sec      Meds:  acetaminophen   Tablet 650 milliGRAM(s) Oral every 6 hours PRN  ATENolol  Tablet 50 milliGRAM(s) Oral daily  calcium carbonate 500 mG (Tums) Chewable 1 Tablet(s) Chew daily  donepezil 10 milliGRAM(s) Oral at bedtime  enoxaparin Injectable 30 milliGRAM(s) SubCutaneous daily  escitalopram 10 milliGRAM(s) Oral daily  ferrous    sulfate 325 milliGRAM(s) Oral daily  furosemide    Tablet 20 milliGRAM(s) Oral daily  gabapentin 100 milliGRAM(s) Oral two times a day  levothyroxine 88 MICROGram(s) Oral daily  losartan 25 milliGRAM(s) Oral daily  memantine 5 milliGRAM(s) Oral daily  multivitamin 1 Tablet(s) Oral daily  ondansetron Injectable 4 milliGRAM(s) IV Push every 6 hours PRN  oxyCODONE    IR 10 milliGRAM(s) Oral three times a day PRN  pantoprazole    Tablet 40 milliGRAM(s) Oral before breakfast  sodium chloride 0.9% lock flush 3 milliLiter(s) IV Push every 8 hours  sodium chloride 0.9%. 1000 milliLiter(s) IV Continuous <Continuous>      Radiology:      Physical exam:  GCS of 14  Pt in no acute distress  Airway is patent  Breathing is symmetric and unlabored  CN II-XII grossly intact  HEENT: normocephalic, RUSTY, EOM wnl, no gross craniofacial bony patholgy to exam  Neck: No tracheal deviation, no JVD, no crepitus, no ecchymosis, no hematoma  Chest: No gross rib or sternal pathology or tenderness to exam, no crepitus, no ecchymosis, no hematoma  Resp: CTAB  CVS: S1S2(+)  ABD: bowel sounds (+), soft, nontender, non distended, no rebound, no guarding, no rigidity, no pelvic instability to exam (+) tenderness to left pelvic region from known fracture pathology  EXT: no gross calf tenderness or edema to exam b/l, pt has good capillary refill in all digits. Sensoromotor function grossly intact, on VTE prophylaxis  Skin: no adverse skin changes to exam

## 2018-02-22 NOTE — PHYSICAL THERAPY INITIAL EVALUATION ADULT - LEVEL OF INDEPENDENCE, REHAB EVAL
moderate assist (50% patients effort)/rolling to R uninvolved hemipelvis/minimum assist (75% patients effort)

## 2018-02-22 NOTE — PROGRESS NOTE ADULT - SUBJECTIVE AND OBJECTIVE BOX
pt resting in bed,  denies  complaints , NAD  called by RN as she noted crackles and decreased 02 sat to 89 on RA, now 92% on 2L    Vital Signs Last 24 Hrs  T(C): 36.7 (22 Feb 2018 00:40), Max: 37.5 (21 Feb 2018 16:22)  T(F): 98.1 (22 Feb 2018 00:40), Max: 99.5 (21 Feb 2018 16:22)  HR: 61 (22 Feb 2018 00:40) (60 - 84)  BP: 130/44 (22 Feb 2018 00:44) (77/44 - 139/54)  BP(mean): --  RR: 16 (22 Feb 2018 00:40) (15 - 18)  SpO2: 95% (22 Feb 2018 00:40) (91% - 100%)    Heart S1S2, RRR rate 75    Lungs: rales B/L Bases, no wheeze      Abd: Soft, NT , + BS      A/P: S/P fall, pubic rami Fx,      BP low on arm, 130/54 on leg     Obtain correct  cuff size for Pt arm     CXR: atelectesis vs congestion at bases      Encourage incentive spirometer     Seen with Hospitalist /full consult to follow

## 2018-02-22 NOTE — PROGRESS NOTE ADULT - ASSESSMENT
A/P:  Left pelvic fracture  Medical comorbidities of Dementia, Anemia, Atrial Fibrillation, CAD, Cardiac Arrest  2007, HTN, Hyperlipidemia, Hypothyroid, Osteoarthritis of spine, joints, Spinal Stenosis.  GI/DVT prophylaxis  Pain control  F/U labs  Cardiology consult pending  Hospitalist consult pending for medical management  Pt stable from trauma surgical standpoint  Cont current care and meds  Pt aware of and agrees with all of the above

## 2018-02-22 NOTE — PROVIDER CONTACT NOTE (OTHER) - NAME OF MD/NP/PA/DO NOTIFIED:
Dr. Lujan
HOSPITALIST, SPOKE WITH DR. ORELLANA. MD AWARE OF CONSULT.
ROBINSON, SPOKE WITH RONA.
Carlee WHITING and Dr David
Dr. Loera

## 2018-02-22 NOTE — PROVIDER CONTACT NOTE (OTHER) - ACTION/TREATMENT ORDERED:
Stat CBC, chest xray, and pt seen by Carlee WHITING  Pt seen by Dr David, no new orders. Take manual Bp only, take on leg until appropriate size manual cuff  obtained.
Dr. David made aware

## 2018-02-22 NOTE — PHYSICAL THERAPY INITIAL EVALUATION ADULT - PLANNED THERAPY INTERVENTIONS, PT EVAL
gait training/strengthening/bed mobility training/pain relieving modalities PRN to L groin/transfer training/balance training/ROM

## 2018-02-22 NOTE — PHYSICAL THERAPY INITIAL EVALUATION ADULT - CRITERIA FOR SKILLED THERAPEUTIC INTERVENTIONS
functional limitations in following categories/risk reduction/prevention/impairments found/therapy frequency/anticipated discharge recommendation/anticipated equipment needs at discharge/rehab potential/predicted duration of therapy intervention

## 2018-02-22 NOTE — PHYSICAL THERAPY INITIAL EVALUATION ADULT - PRECAUTIONS/LIMITATIONS, REHAB EVAL
+PPM,h/o cardiac arrest 12/2007,underwent [id number],MVR; spinal stimulator leads seen on imaging/fall precautions

## 2018-02-22 NOTE — PHYSICAL THERAPY INITIAL EVALUATION ADULT - DIAGNOSIS, PT EVAL
fall,acute L inferior & superior pubic rami fxs. fall, acute L inferior & superior pubic rami fxs,gait antalgia/impairment

## 2018-02-22 NOTE — PHYSICAL THERAPY INITIAL EVALUATION ADULT - ACTIVE RANGE OF MOTION EXAMINATION, REHAB EVAL
bilateral upper extremity Active ROM was WFL (within functional limits)/Right LE Active ROM was WFL (within functional limits)/AAROM LLE WFL/deficits as listed below

## 2018-02-23 LAB
ANION GAP SERPL CALC-SCNC: 8 MMOL/L — SIGNIFICANT CHANGE UP (ref 5–17)
BUN SERPL-MCNC: 39 MG/DL — HIGH (ref 7–23)
CALCIUM SERPL-MCNC: 8.1 MG/DL — LOW (ref 8.5–10.1)
CHLORIDE SERPL-SCNC: 107 MMOL/L — SIGNIFICANT CHANGE UP (ref 96–108)
CO2 SERPL-SCNC: 24 MMOL/L — SIGNIFICANT CHANGE UP (ref 22–31)
CREAT SERPL-MCNC: 2.42 MG/DL — HIGH (ref 0.5–1.3)
GLUCOSE SERPL-MCNC: 88 MG/DL — SIGNIFICANT CHANGE UP (ref 70–99)
HCT VFR BLD CALC: 37.2 % — SIGNIFICANT CHANGE UP (ref 34.5–45)
HGB BLD-MCNC: 11.7 G/DL — SIGNIFICANT CHANGE UP (ref 11.5–15.5)
MAGNESIUM SERPL-MCNC: 2.1 MG/DL — SIGNIFICANT CHANGE UP (ref 1.6–2.6)
MCHC RBC-ENTMCNC: 31.5 GM/DL — LOW (ref 32–36)
MCHC RBC-ENTMCNC: 32.2 PG — SIGNIFICANT CHANGE UP (ref 27–34)
MCV RBC AUTO: 102.2 FL — HIGH (ref 80–100)
PHOSPHATE SERPL-MCNC: 4.7 MG/DL — HIGH (ref 2.5–4.5)
PLATELET # BLD AUTO: 77 K/UL — LOW (ref 150–400)
POTASSIUM SERPL-MCNC: 4.2 MMOL/L — SIGNIFICANT CHANGE UP (ref 3.5–5.3)
POTASSIUM SERPL-SCNC: 4.2 MMOL/L — SIGNIFICANT CHANGE UP (ref 3.5–5.3)
RBC # BLD: 3.64 M/UL — LOW (ref 3.8–5.2)
RBC # FLD: 13.9 % — SIGNIFICANT CHANGE UP (ref 10.3–14.5)
SODIUM SERPL-SCNC: 139 MMOL/L — SIGNIFICANT CHANGE UP (ref 135–145)
WBC # BLD: 6.2 K/UL — SIGNIFICANT CHANGE UP (ref 3.8–10.5)
WBC # FLD AUTO: 6.2 K/UL — SIGNIFICANT CHANGE UP (ref 3.8–10.5)

## 2018-02-23 RX ORDER — SODIUM CHLORIDE 9 MG/ML
1000 INJECTION INTRAMUSCULAR; INTRAVENOUS; SUBCUTANEOUS
Qty: 0 | Refills: 0 | Status: DISCONTINUED | OUTPATIENT
Start: 2018-02-23 | End: 2018-02-25

## 2018-02-23 RX ADMIN — Medication 650 MILLIGRAM(S): at 13:09

## 2018-02-23 RX ADMIN — Medication 88 MICROGRAM(S): at 06:23

## 2018-02-23 RX ADMIN — DONEPEZIL HYDROCHLORIDE 10 MILLIGRAM(S): 10 TABLET, FILM COATED ORAL at 21:26

## 2018-02-23 RX ADMIN — SODIUM CHLORIDE 75 MILLILITER(S): 9 INJECTION INTRAMUSCULAR; INTRAVENOUS; SUBCUTANEOUS at 06:47

## 2018-02-23 RX ADMIN — GABAPENTIN 100 MILLIGRAM(S): 400 CAPSULE ORAL at 18:02

## 2018-02-23 RX ADMIN — Medication 325 MILLIGRAM(S): at 11:06

## 2018-02-23 RX ADMIN — SODIUM CHLORIDE 3 MILLILITER(S): 9 INJECTION INTRAMUSCULAR; INTRAVENOUS; SUBCUTANEOUS at 20:50

## 2018-02-23 RX ADMIN — SODIUM CHLORIDE 3 MILLILITER(S): 9 INJECTION INTRAMUSCULAR; INTRAVENOUS; SUBCUTANEOUS at 06:22

## 2018-02-23 RX ADMIN — MEMANTINE HYDROCHLORIDE 5 MILLIGRAM(S): 10 TABLET ORAL at 11:06

## 2018-02-23 RX ADMIN — Medication 1 TABLET(S): at 11:06

## 2018-02-23 RX ADMIN — ENOXAPARIN SODIUM 30 MILLIGRAM(S): 100 INJECTION SUBCUTANEOUS at 11:07

## 2018-02-23 RX ADMIN — ESCITALOPRAM OXALATE 10 MILLIGRAM(S): 10 TABLET, FILM COATED ORAL at 11:07

## 2018-02-23 RX ADMIN — SODIUM CHLORIDE 3 MILLILITER(S): 9 INJECTION INTRAMUSCULAR; INTRAVENOUS; SUBCUTANEOUS at 13:07

## 2018-02-23 RX ADMIN — GABAPENTIN 100 MILLIGRAM(S): 400 CAPSULE ORAL at 06:23

## 2018-02-23 RX ADMIN — PANTOPRAZOLE SODIUM 40 MILLIGRAM(S): 20 TABLET, DELAYED RELEASE ORAL at 06:23

## 2018-02-23 NOTE — PROGRESS NOTE ADULT - ASSESSMENT
A/P:  Left pelvic fracture  Medical comorbidities of Dementia, Anemia, Atrial Fibrillation, CAD, Cardiac Arrest  2007, HTN, Hyperlipidemia, Hypothyroid, Osteoarthritis of spine, joints, Spinal Stenosis.  GI/DVT prophylaxis  Pain control  F/U labs  Cardiology consult  Hospitalist consult for medical management  Pt stable from trauma surgical standpoint  Cont current care and meds  Social service for d/c planning

## 2018-02-23 NOTE — PROGRESS NOTE ADULT - SUBJECTIVE AND OBJECTIVE BOX
CC:Patient is a 85y old  Female who presents with a chief complaint of I fell at home (21 Feb 2018 17:54)      Subjective:  Pt seen and examined at bedside with chaperone. Pt is awake, alert, cooperative, comfortable, confused with dementia, pt in no acute distress. No reported c/o fever, chills, chest pain, SOB, abd pain, N/V/D, hemoptysis, hematemesis, hematuria, hematochexia, headache, diplopia, vertigo, dizzyness. Pt tolerating diet, (+) void, (+) bowel function, fall risk precautions    ROS:  limited ROS secondary to dementia      Vital Signs Last 24 Hrs  T(C): 37 (23 Feb 2018 05:26), Max: 37.3 (22 Feb 2018 19:51)  T(F): 98.6 (23 Feb 2018 05:26), Max: 99.1 (22 Feb 2018 19:51)  HR: 61 (23 Feb 2018 05:26) (57 - 61)  BP: 90/40 (23 Feb 2018 05:26) (90/40 - 93/42)  BP(mean): --  RR: 17 (23 Feb 2018 05:26) (16 - 17)  SpO2: 96% (23 Feb 2018 05:26) (96% - 98%)    Labs:                      12.0   4.8   )-----------( 83       ( 22 Feb 2018 02:11 )             36.3     CBC Full  -  ( 22 Feb 2018 02:11 )  WBC Count : 4.8 K/uL  Hemoglobin : 12.0 g/dL  Hematocrit : 36.3 %  Platelet Count - Automated : 83 K/uL  Mean Cell Volume : 99.9 fl  Mean Cell Hemoglobin : 33.1 pg  Mean Cell Hemoglobin Concentration : 33.1 gm/dL  Auto Neutrophil # : x  Auto Lymphocyte # : x  Auto Monocyte # : x  Auto Eosinophil # : x  Auto Basophil # : x  Auto Neutrophil % : x  Auto Lymphocyte % : x  Auto Monocyte % : x  Auto Eosinophil % : x  Auto Basophil % : x    02-23    139  |  107  |  39<H>  ----------------------------<  88  4.2   |  24  |  2.42<H>    Ca    8.1<L>      23 Feb 2018 08:41  Phos  4.7     02-23  Mg     2.1     02-23              Meds:  acetaminophen   Tablet 650 milliGRAM(s) Oral every 6 hours PRN  calcium carbonate 500 mG (Tums) Chewable 1 Tablet(s) Chew daily  donepezil 10 milliGRAM(s) Oral at bedtime  enoxaparin Injectable 30 milliGRAM(s) SubCutaneous daily  escitalopram 10 milliGRAM(s) Oral daily  ferrous    sulfate 325 milliGRAM(s) Oral daily  gabapentin 100 milliGRAM(s) Oral two times a day  levothyroxine 88 MICROGram(s) Oral daily  memantine 5 milliGRAM(s) Oral daily  multivitamin 1 Tablet(s) Oral daily  ondansetron Injectable 4 milliGRAM(s) IV Push every 6 hours PRN  oxyCODONE    IR 10 milliGRAM(s) Oral three times a day PRN  pantoprazole    Tablet 40 milliGRAM(s) Oral before breakfast  sodium chloride 0.9% lock flush 3 milliLiter(s) IV Push every 8 hours  sodium chloride 0.9%. 1000 milliLiter(s) IV Continuous <Continuous>      Radiology:      Physical exam:  GCS of 14  Pt in no acute distress  Airway is patent  Breathing is symmetric and unlabored  CN II-XII grossly intact  HEENT: normocephalic, RUSTY, EOM wnl, no gross craniofacial bony patholgy to exam  Neck: No tracheal deviation, no JVD, no crepitus, no ecchymosis, no hematoma  Chest: No gross rib or sternal pathology or tenderness to exam, no crepitus, no ecchymosis, no hematoma  Resp: CTAB  CVS: S1S2(+)  ABD: bowel sounds (+), soft, nontender, non distended, no rebound, no guarding, no rigidity, no pelvic instability to exam (+) tenderness to left pelvic region from known fracture pathology  EXT: no gross calf tenderness or edema to exam b/l, pt has good capillary refill in all digits. Sensoromotor function grossly intact, on VTE prophylaxis  Skin: no adverse skin changes to exam

## 2018-02-23 NOTE — CONSULT NOTE ADULT - SUBJECTIVE AND OBJECTIVE BOX
CHIEF COMPLAINT:    HPI:  · Chief Complaint: The patient is a 85y Female complaining of fall.	  · Unable to Obtain: Dementia	  · HPI Objective Statement: pt found down on floor by son this morning.  pt cannot say what happened.  son states that although she cannot remember what happened she appears normal to him right now.  no recent illness or increased confusion.  pt c/o pain to L groin	  Patient is a poor historian, no complaints at the time of exam.  Pain controlled.    EKG on admission showed V pacing rate 69. PM interrogation showed V pacing with normal VVIR PM function. Hgb has gone down from 13.5 to 12.0(). Renal function abnormal, pt on IVF, probable dehydration. No chest pain.    Vital Signs Last 24 Hrs  T(C): 37.3 (2018 14:37), Max: 37.3 (2018 14:37)  T(F): 99.1 (2018 14:37), Max: 99.1 (2018 14:37)  HR: 60 (2018 14:15) (60 - 84)  BP: 77/44 (2018 14:15) (77/44 - 119/75)  BP(mean): --  RR: 17 (2018 12:07) (16 - 18)  SpO2: 98% (2018 14:15) (96% - 100%) (2018 14:50)      PAST MEDICAL & SURGICAL HISTORY:  Anemia  Cataract: implants   Cardiac Arrest: - lead to CABG and mitral valve repair  Spinal Stenosis  Osteoarthritis: spine, joints  Hypothyroid: last TFT&#x27;s 2 weeks ago - normal  CAD (Coronary Artery Disease) of Bypass Graft: bypass times 1,  2007 [facility]  Mitral valve repair: CAD 2007 placed on coumadin to remain as per Dr Fernandez  Hyperlipidemia  HTN - Hypertension:   Pacemaker: STAN  - Medtronic ADDROI/ [id number] - [facility]  Atrial Fibrillation:   on baby aspirin to maintain as per Dr Fernandez  Depression: 3 years  History of Tonsillectomy: as child  Blepharoptosis:  - bilateral  partial thyroidectomy:   Breast Cyst: left - benign   hysterectomy:   Facet block: of spine   Spinal Disorder: spinal stimulator trial  - then removed  pacemaker: STAN  Medtronic ADDROI/[id number]  mitral valve repair/ CAB bypass times 1 - [facility]      Allergies    amoxicillin (Rash)  Daypro (Other)  Keflex (Rash)  Tikosyn (Unknown)    Intolerances        Occupation:  Alochol: Denied  Smoking: Denied  Drug Use: Denied  Marital Status:         FAMILY HISTORY:  No pertinent family history in first degree relatives      REVIEW OF SYSTEMS:    CONSTITUTIONAL: No weakness, fevers or chills  EYES/ENT: No visual changes;  No vertigo or throat pain   NECK: No pain or stiffness  RESPIRATORY: No cough, wheezing, hemoptysis; No shortness of breath  CARDIOVASCULAR: No chest pain or palpitations  GASTROINTESTINAL: No abdominal or epigastric pain. No nausea, vomiting, or hematemesis; No diarrhea or constipation. No melena or hematochezia.  GENITOURINARY: No dysuria, frequency or hematuria  NEUROLOGICAL: No numbness or weakness  SKIN: No itching, burning, rashes, or lesions   All other review of systems is negative unless indicated above    Vital Signs Last 24 Hrs  T(C): 37 (2018 05:26), Max: 37.3 (2018 19:51)  T(F): 98.6 (2018 05:26), Max: 99.1 (2018 19:51)  HR: 61 (2018 05:26) (57 - 61)  BP: 90/40 (2018 05:26) (87/41 - 93/42)  BP(mean): --  RR: 17 (2018 05:26) (16 - 17)  SpO2: 96% (2018 05:26) (96% - 98%)    I&O's Summary    2018 07:01  -  2018 07:00  --------------------------------------------------------  IN: 240 mL / OUT: 0 mL / NET: 240 mL        PHYSICAL EXAM:    Constitutional: NAD, awake and alert, well-developed  HEENT: PERR, EOMI,  No oral cyananosis.  Neck:  supple,  No JVD  Respiratory: Breath sounds are clear bilaterally, No wheezing, rales or rhonchi  Cardiovascular: S1 and S2, regular rate and rhythm, no Murmurs, gallops or rubs  Gastrointestinal: Bowel Sounds present, soft, nontender.   Extremities: No peripheral edema. No clubbing or cyanosis.  Vascular: 2+ peripheral pulses  Neurological: A/O x 3, no focal deficits  Musculoskeletal: no calf tenderness.  Skin: No rashes.    MEDICATIONS:  MEDICATIONS  (STANDING):  calcium carbonate 500 mG (Tums) Chewable 1 Tablet(s) Chew daily  donepezil 10 milliGRAM(s) Oral at bedtime  enoxaparin Injectable 30 milliGRAM(s) SubCutaneous daily  escitalopram 10 milliGRAM(s) Oral daily  ferrous    sulfate 325 milliGRAM(s) Oral daily  gabapentin 100 milliGRAM(s) Oral two times a day  levothyroxine 88 MICROGram(s) Oral daily  memantine 5 milliGRAM(s) Oral daily  multivitamin 1 Tablet(s) Oral daily  pantoprazole    Tablet 40 milliGRAM(s) Oral before breakfast  sodium chloride 0.9% lock flush 3 milliLiter(s) IV Push every 8 hours  sodium chloride 0.9%. 1000 milliLiter(s) (75 mL/Hr) IV Continuous <Continuous>      LABS: All Labs Reviewed:                        12.0   4.8   )-----------( 83       ( 2018 02:11 )             36.3                         13.5   7.1   )-----------( 103      ( 2018 07:27 )             43.2     2018 07:27    136    |  101    |  22     ----------------------------<  90     3.9     |  29     |  1.44     Ca    8.8        2018 07:27  Mg     2.0       2018 07:27    TPro  7.7    /  Alb  4.1    /  TBili  1.1    /  DBili  x      /  AST  30     /  ALT  24     /  AlkPhos  177    2018 07:27          Blood Culture: Organism --  Gram Stain Blood -- Gram Stain --  Specimen Source .Urine None  Culture-Blood --            RADIOLOGY/EKG:
HISTORY OBTAINED FROM AVAILABLE RECORDS AS PATIENT WITH DEMENTIA AND IS A POOR HISTORIAN.  C/C: FALL    HPI: 85F, pmh of dementia, spinal stenosis, CAD, CABG, Cardiac ablation, PPM, DM, Atrial fibrillation, HTN, GERD, hypothyroidism who presented to the ER after a fall. She was apparently found by her son. EMS was called and was brought to the ER. In ED found to have left superior/inferior pubic rami fractures and was admitted to trauma service. Hospitalist service consulted for medical comanagement. Events leading to fall are unclear. Patient has dementia and cannot provide accurate history. At the time of interview and exam denied pain/sob/dizziness/lightheadeness/sob or chest pain. No f/c/r. No dysuria. No cough.     ROS: all 10 systems reviewed and is as above otherwise negative, but limited due to dementia    PMH: as above  PSH: multiple back surgeries, mitral valve repair, CABG, PPM, SUNITHA, Breast lumpectomy, partial thyroidectomy, cataract surgery  F/H: father-lung disease, mother-heart disease  Social: denies tobacco/etoh  Allergies: amoxicillin, keflex, tikosyn, daypro  Home meds: see med rec    Vital Signs Last 24 Hrs  T(C): 37.2 (2018 05:15), Max: 37.5 (2018 16:22)  T(F): 98.9 (2018 05:15), Max: 99.5 (2018 16:22)  HR: 60 (2018 10:17) (60 - 68)  BP: 87/41 (2018 10:17) (85/46 - 139/54)  RR: 16 (2018 10:17) (15 - 16)  SpO2: 96% (2018 10:17) (91% - 100%)    PHYSICAL EXAM:    GENERAL: Elderly female, laying in bed, Comfortable, no acute distress   HEAD:  Normocephalic, atraumatic  EYES: EOMI, PERRLA  HEENT: dry mucous membranes  NECK: Supple, No JVD  NERVOUS SYSTEM:  confused, non focal  CHEST/LUNG: Clear to auscultation bilaterally  HEART:s1, s2+ Regular rate and rhythm  ABDOMEN: Soft, Nontender, Nondistended, Bowel sounds present  GENITOURINARY: Voiding, no palpable bladder  EXTREMITIES:   No clubbing, cyanosis. trace b/l le edema  MUSCULOSKELETAL- Normal tone  SKIN-no rash    LABS:                        12.0   4.8   )-----------( 83       ( 2018 02:11 )             36.3         136  |  101  |  22  ----------------------------<  90  3.9   |  29  |  1.44<H>    Ca    8.8      2018 07:27  Mg     2.0         TPro  7.7  /  Alb  4.1  /  TBili  1.1  /  DBili  x   /  AST  30  /  ALT  24  /  AlkPhos  177<H>      PT/INR - ( 2018 07:27 )   PT: 12.0 sec;   INR: 1.11 ratio         PTT - ( 2018 07:27 )  PTT:32.4 sec  Urinalysis Basic - ( 2018 08:17 )    Color: Rhonda / Appearance: Slightly Turbid / S.015 / pH: x  Gluc: x / Ketone: Trace  / Bili: Negative / Urobili: 1 mg/dL   Blood: x / Protein: 100 mg/dL / Nitrite: Negative   Leuk Esterase: Trace / RBC: Negative /HPF / WBC 0-2   Sq Epi: x / Non Sq Epi: Occasional / Bacteria: Few    POCT Blood Glucose.: 102 mg/dL (2018 14:49)    CARDIAC MARKERS ( 2018 07:27 )  <0.015 ng/mL / x     / 141 U/L / x     / x         CT Head No Cont (18 @ 08:33) >  IMPRESSION:       No acute intracranial findings.    Xray Hip w/ Pelvis 1 View, Left (18 @ 08:45) >  Impression:    Question left superior and inferior pubic rami fractures. No definite   femoral neck fractures however CT or MRI can be obtained if concern for   occult fracture      ASSESSMENT AND PLAN:  85F, PMH AS ABOVE A/W:    1. Unwitnessed fall with left superior/inferior pubic rami fractures:  -can not rule out syncope  -check orthostatic vitals  -hold diuretics/antihypertensives  -interrogate ppm  -cardio consulted  -CT head negative  -pain control  -physical therapy    2. JC:  -hold diuretic/arb  -ivf-->monitor for s/s of chf    3. H/o HTN now with hypotension:  -does not seem to be symptomatic  -monitor  -hold arb/lasix/bb    4. HLD:  -statin    5. Atrial fibrillation:  -rate controlled  -hold bb for hypotension  -does not appear to be on a/c    6. ?CHF:  -hold lasix  -check echo    7. H/o CAD/CABG/MItral valve repair:  -hold bb for hypotension  -statin    8. Dementia:  -aricept/namenda    9. Depression:  -continue lexapro    10, Hypothyroid:  -continue synthroid    11. Thrombocytopenia:  -?new   -repeat in am    12. DVT px    Thanks for this consult, will follow.
Patient is a 85y Female whom presented to the hospital with a history of CAD/VHD, OA, HLD, HTN, AF with renal evaluation of JC. Patient here with fall, possible syncope and noted with pubic rami fracture now renal evaluation of JC. Patient with limited intake during stay she reports, prior possible as well. Patient on chronic diuretics as outpatient, maintained as inpatient. IVF started today, lasix held. Patient has no  complaints, no SOB.    PAST MEDICAL & SURGICAL HISTORY:  Anemia  Cataract: implants   Cardiac Arrest: - lead to CABG and mitral valve repair  Spinal Stenosis  Osteoarthritis: spine, joints  Hypothyroid: last TFT&#x27;s 2 weeks ago - normal  CAD (Coronary Artery Disease) of Bypass Graft: bypass times 1,  2007 [facility]  Mitral valve repair: CAD 2007 placed on coumadin to remain as per Dr Fernandez  Hyperlipidemia  HTN - Hypertension:   Pacemaker: LACW  - Medtronic ADDROI/ [id number] - [facility]  Atrial Fibrillation:   on baby aspirin to maintain as per Dr Fernandez  Depression: 3 years  History of Tonsillectomy: as child  Blepharoptosis:  - bilateral  partial thyroidectomy:   Breast Cyst: left - benign   hysterectomy:   Facet block: of spine   Spinal Disorder: spinal stimulator trial  - then removed  pacemaker: LACW  Medtronic ADDROI/[id number]  mitral valve repair/ CAB bypass times 1 - [facility]      MEDICATIONS  (STANDING):  calcium carbonate 500 mG (Tums) Chewable 1 Tablet(s) Chew daily  donepezil 10 milliGRAM(s) Oral at bedtime  enoxaparin Injectable 30 milliGRAM(s) SubCutaneous daily  escitalopram 10 milliGRAM(s) Oral daily  ferrous    sulfate 325 milliGRAM(s) Oral daily  gabapentin 100 milliGRAM(s) Oral two times a day  levothyroxine 88 MICROGram(s) Oral daily  memantine 5 milliGRAM(s) Oral daily  multivitamin 1 Tablet(s) Oral daily  pantoprazole    Tablet 40 milliGRAM(s) Oral before breakfast  sodium chloride 0.9% lock flush 3 milliLiter(s) IV Push every 8 hours  sodium chloride 0.9%. 1000 milliLiter(s) (75 mL/Hr) IV Continuous <Continuous>    MEDICATIONS  (PRN):  acetaminophen   Tablet 650 milliGRAM(s) Oral every 6 hours PRN pain and temp  ondansetron Injectable 4 milliGRAM(s) IV Push every 6 hours PRN Nausea and/or Vomiting  oxyCODONE    IR 10 milliGRAM(s) Oral three times a day PRN Severe Pain (7 - 10)      Allergies    amoxicillin (Rash)  Daypro (Other)  Keflex (Rash)  Tikosyn (Unknown)    Intolerances        SOCIAL HISTORY:  No etoh/cigg    FAMILY HISTORY:  No pertinent family history in first degree relatives of renal disease      REVIEW OF SYSTEMS:    CONSTITUTIONAL: stable weakness, denies fevers or chills  EYES/ENT: No visual changes;  No vertigo or throat pain   NECK: No pain or stiffness  RESPIRATORY: No cough, wheezing, hemoptysis; No shortness of breath  CARDIOVASCULAR: No chest pain or palpitations  GASTROINTESTINAL: No abdominal or epigastric pain. No nausea, vomiting, or hematemesis; No diarrhea or constipation. No melena or hematochezia.  GENITOURINARY: No dysuria, frequency or hematuria  NEUROLOGICAL: No numbness or weakness  SKIN: No itching, burning, rashes, or lesions   All other review of systems is negative unless indicated above.      T(C): , Max: 37.3 (18 @ 19:51)  T(F): , Max: 99.1 (18 @ 19:51)  HR: 61 (18 @ 12:15)  BP: 92/46 (18 @ 12:15)  BP(mean): --  RR: 18 (18 @ 12:15)  SpO2: 96% (18 @ 12:15)  Wt(kg): --     @ 07:01  -   @ 07:00  --------------------------------------------------------  IN: 240 mL / OUT: 0 mL / NET: 240 mL          PHYSICAL EXAM:    Constitutional: NAD, frail  HEENT: PERRLA, EOMI,  MMM  Neck: No LAD, No JVD  Respiratory: dist BS  Cardiovascular: S1 and S2, RRR  Gastrointestinal: BS+, soft, NT/ND  Extremities: LE chronic changes  Neurological: Alert and conversant  : No Tara  Skin: No rashes  Access: Not applicable        LABS:                        11.7   6.2   )-----------( 77       ( 2018 12:12 )             37.2     2018 08:41    139    |  107    |  39     ----------------------------<  88     4.2     |  24     |  2.42   2018 07:27    136    |  101    |  22     ----------------------------<  90     3.9     |  29     |  1.44     Ca    8.1        2018 08:41  Ca    8.8        2018 07:27  Phos  4.7       2018 08:41  Mg     2.1       2018 08:41  Mg     2.0       2018 07:27    TPro  7.7    /  Alb  4.1    /  TBili  1.1    /  DBili  x      /  AST  30     /  ALT  24     /  AlkPhos  177    2018 07:27          Urine Studies:          RADIOLOGY & ADDITIONAL STUDIES:

## 2018-02-23 NOTE — CONSULT NOTE ADULT - ASSESSMENT
1. Unwitnessed fall with left superior/inferior pubic rami fractures:  -can not rule out syncope  -check orthostatic vitals  -hold diuretics/antihypertensives  -interrogate ppm, as above  -CT head negative  -pain control  -physical therapy    2. JC:  -hold diuretic/arb  -ivf-->monitor for s/s of chf    3. H/o HTN now with hypotension probably due to dehydration, but Hgb droppine  -repeat CBC and BMP  -does not seem to be symptomatic  -monitor  -hold arb/lasix/bb    4. HLD:  -statin    5. Atrial fibrillation:  -rate controlled  -hold bb for hypotension  -does not appear to be on a/c  -I would not a/c pt due to falls    6. ?CHF:  -hold lasix  -check echo    7. H/o CAD/CABG/MItral valve repair:  -hold bb for hypotension  -statin    8. Dementia:  -aricept/namenda    9. Depression:  -continue lexapro    10, Hypothyroid:  -continue synthroid    11. Thrombocytopenia:  -?new   -repeat in am    12. DVT px

## 2018-02-23 NOTE — PROGRESS NOTE ADULT - SUBJECTIVE AND OBJECTIVE BOX
c/c: fall    HPI: 85F, pmh of dementia, spinal stenosis, CAD, CABG, Cardiac ablation, PPM, DM, Atrial fibrillation, HTN, GERD, hypothyroidism who presented to the ER after a fall. She was apparently found by her son. EMS was called and was brought to the ER. In ED found to have left superior/inferior pubic rami fractures and was admitted to trauma service. Hospitalist service consulted for medical comanagement. Events leading to fall are unclear. Patient has dementia and cannot provide accurate history.  Hospital course notable for hypotension/anibal    2/23: pt seen and examined this am. Felt well. No complaints.     ROS: all 10 systems reviewed and is as above otherwise negative, but limited due to dementia    Vital Signs Last 24 Hrs  T(C): 37 (23 Feb 2018 12:15), Max: 37.3 (22 Feb 2018 19:51)  T(F): 98.6 (23 Feb 2018 12:15), Max: 99.1 (22 Feb 2018 19:51)  HR: 61 (23 Feb 2018 12:15) (57 - 61)  BP: 92/46 (23 Feb 2018 12:15) (90/40 - 93/42)  RR: 18 (23 Feb 2018 12:15) (16 - 18)  SpO2: 96% (23 Feb 2018 12:15) (96% - 98%)    PHYSICAL EXAM:    GENERAL: Elderly female, laying in bed, Comfortable, no acute distress   HEAD:  Normocephalic, atraumatic  EYES: EOMI, PERRLA  HEENT: dry mucous membranes  NECK: Supple, No JVD  NERVOUS SYSTEM:  confused, non focal  CHEST/LUNG: Clear to auscultation bilaterally  HEART:s1, s2+ Regular rate and rhythm  ABDOMEN: Soft, Nontender, Nondistended, Bowel sounds present  GENITOURINARY: Voiding, no palpable bladder  EXTREMITIES:   No clubbing, cyanosis. trace b/l le edema  MUSCULOSKELETAL- Normal tone  SKIN-no rash c/c: fall    HPI: 85F, pmh of dementia, spinal stenosis, CAD, CABG, Cardiac ablation, PPM, DM, Atrial fibrillation, HTN, GERD, hypothyroidism who presented to the ER after a fall. She was apparently found by her son. EMS was called and was brought to the ER. In ED found to have left superior/inferior pubic rami fractures and was admitted to trauma service. Hospitalist service consulted for medical comanagement. Events leading to fall are unclear. Patient has dementia and cannot provide accurate history.  Hospital course notable for hypotension/jc    2/23: pt seen and examined this am. Felt well. No complaints.     ROS: all 10 systems reviewed and is as above otherwise negative, but limited due to dementia    Vital Signs Last 24 Hrs  T(C): 37 (23 Feb 2018 12:15), Max: 37.3 (22 Feb 2018 19:51)  T(F): 98.6 (23 Feb 2018 12:15), Max: 99.1 (22 Feb 2018 19:51)  HR: 61 (23 Feb 2018 12:15) (57 - 61)  BP: 92/46 (23 Feb 2018 12:15) (90/40 - 93/42)  RR: 18 (23 Feb 2018 12:15) (16 - 18)  SpO2: 96% (23 Feb 2018 12:15) (96% - 98%)    PHYSICAL EXAM:    GENERAL: Elderly female, laying in bed, Comfortable, no acute distress   HEAD:  Normocephalic, atraumatic  EYES: EOMI, PERRLA  HEENT: dry mucous membranes  NECK: Supple, No JVD  NERVOUS SYSTEM:  confused, non focal  CHEST/LUNG: Clear to auscultation bilaterally  HEART:s1, s2+ Regular rate and rhythm  ABDOMEN: Soft, Nontender, Nondistended, Bowel sounds present  GENITOURINARY: Voiding, no palpable bladder  EXTREMITIES:   No clubbing, cyanosis. trace b/l le edema  MUSCULOSKELETAL- Normal tone  SKIN-no rash    LABS:                        11.7   6.2   )-----------( 77       ( 23 Feb 2018 12:12 )             37.2     02-23    139  |  107  |  39<H>  ----------------------------<  88  4.2   |  24  |  2.42<H>    Ca    8.1<L>      23 Feb 2018 08:41  Phos  4.7     02-23  Mg     2.1     02-23    ASSESSMENT AND PLAN:    1. Unwitnessed fall with left superior/inferior pubic rami fractures:  -can not rule out syncope  -noted to be hypotensive  -hold diuretics/antihypertensives  - ppm interogation unremarkable.  -cardio consult appreciated, f/u echo  -CT head negative  -pain control  -physical therapy    2. JC:  -?atn from hypotension  -continue hold diuretic/arb  -ivf-->monitor for s/s of chf  -renal sono  -nephrology eval    3. H/o HTN now with hypotension:  -hold arb/lasix/bb  -ivf  -f/u echo  -check am cortisol.    4. HLD:  -statin    5. Atrial fibrillation:  -rate controlled  -hold bb for hypotension  -does not appear to be on a/c and would continue to hold off due to fall risk    6. ?CHF:  -hold lasix  -check echo    7. H/o CAD/CABG/MItral valve repair:  -hold bb for hypotension  -statin    8. Dementia:  -aricept/namenda    9. Depression:  -continue lexapro    10, Hypothyroid:  -continue synthroid    11. Thrombocytopenia:  -?new  -monitor

## 2018-02-23 NOTE — CONSULT NOTE ADULT - ASSESSMENT
85y Female whom presented to the hospital with a history of CAD/VHD, OA, HLD, HTN, AF with renal evaluation of AK in the setting of fall, possible syncope and noted with pubic rami fracture. Clinically appears pre-renal likely from limited intake, diuretic use.    JC  -Agree with hydration  -diuretic hold  -Urine studies  -renal imaging pending  -NSAID avoidance  -Labs in AM    [facility]  -Dr. Lujan following  -Monitor volume status closely with IVF    Pubic rami fracture  -Ortho/PT    thanks, will follow with you    d/c with Dr. Black 85y Female whom presented to the hospital with a history of CAD/VHD, OA, HLD, HTN, AF with renal evaluation of AK in the setting of fall, possible syncope and noted with pubic rami fracture. Clinically appears pre-renal likely from limited intake, diuretic use.    JC  -Agree with hydration  -diuretic hold  -Urine studies if function continues to rise/able to obtain  -renal imaging pending  -NSAID avoidance  -Labs in AM    [facility]  -Dr. Lujan following  -Monitor volume status closely with IVF    Pubic rami fracture  -Ortho/PT    thanks, will follow with you    d/c with Dr. Black

## 2018-02-24 ENCOUNTER — TRANSCRIPTION ENCOUNTER (OUTPATIENT)
Age: 83
End: 2018-02-24

## 2018-02-24 LAB
ANION GAP SERPL CALC-SCNC: 8 MMOL/L — SIGNIFICANT CHANGE UP (ref 5–17)
BASOPHILS # BLD AUTO: 0.1 K/UL — SIGNIFICANT CHANGE UP (ref 0–0.2)
BASOPHILS NFR BLD AUTO: 1.3 % — SIGNIFICANT CHANGE UP (ref 0–2)
BUN SERPL-MCNC: 43 MG/DL — HIGH (ref 7–23)
CALCIUM SERPL-MCNC: 7.9 MG/DL — LOW (ref 8.5–10.1)
CHLORIDE SERPL-SCNC: 106 MMOL/L — SIGNIFICANT CHANGE UP (ref 96–108)
CO2 SERPL-SCNC: 24 MMOL/L — SIGNIFICANT CHANGE UP (ref 22–31)
CREAT SERPL-MCNC: 2.47 MG/DL — HIGH (ref 0.5–1.3)
EOSINOPHIL # BLD AUTO: 0.1 K/UL — SIGNIFICANT CHANGE UP (ref 0–0.5)
EOSINOPHIL NFR BLD AUTO: 2.7 % — SIGNIFICANT CHANGE UP (ref 0–6)
GLUCOSE SERPL-MCNC: 89 MG/DL — SIGNIFICANT CHANGE UP (ref 70–99)
HCT VFR BLD CALC: 35.4 % — SIGNIFICANT CHANGE UP (ref 34.5–45)
HGB BLD-MCNC: 11.8 G/DL — SIGNIFICANT CHANGE UP (ref 11.5–15.5)
LYMPHOCYTES # BLD AUTO: 0.9 K/UL — LOW (ref 1–3.3)
LYMPHOCYTES # BLD AUTO: 16.9 % — SIGNIFICANT CHANGE UP (ref 13–44)
MAGNESIUM SERPL-MCNC: 2.2 MG/DL — SIGNIFICANT CHANGE UP (ref 1.6–2.6)
MCHC RBC-ENTMCNC: 33.2 PG — SIGNIFICANT CHANGE UP (ref 27–34)
MCHC RBC-ENTMCNC: 33.3 GM/DL — SIGNIFICANT CHANGE UP (ref 32–36)
MCV RBC AUTO: 99.8 FL — SIGNIFICANT CHANGE UP (ref 80–100)
MONOCYTES # BLD AUTO: 0.6 K/UL — SIGNIFICANT CHANGE UP (ref 0–0.9)
MONOCYTES NFR BLD AUTO: 12.1 % — SIGNIFICANT CHANGE UP (ref 2–14)
NEUTROPHILS # BLD AUTO: 3.5 K/UL — SIGNIFICANT CHANGE UP (ref 1.8–7.4)
NEUTROPHILS NFR BLD AUTO: 67 % — SIGNIFICANT CHANGE UP (ref 43–77)
PHOSPHATE SERPL-MCNC: 4.2 MG/DL — SIGNIFICANT CHANGE UP (ref 2.5–4.5)
PLATELET # BLD AUTO: 78 K/UL — LOW (ref 150–400)
POTASSIUM SERPL-MCNC: 4 MMOL/L — SIGNIFICANT CHANGE UP (ref 3.5–5.3)
POTASSIUM SERPL-SCNC: 4 MMOL/L — SIGNIFICANT CHANGE UP (ref 3.5–5.3)
RBC # BLD: 3.55 M/UL — LOW (ref 3.8–5.2)
RBC # FLD: 13.8 % — SIGNIFICANT CHANGE UP (ref 10.3–14.5)
SODIUM SERPL-SCNC: 138 MMOL/L — SIGNIFICANT CHANGE UP (ref 135–145)
TSH SERPL-MCNC: 1.97 UU/ML — SIGNIFICANT CHANGE UP (ref 0.36–3.74)
WBC # BLD: 5.3 K/UL — SIGNIFICANT CHANGE UP (ref 3.8–10.5)
WBC # FLD AUTO: 5.3 K/UL — SIGNIFICANT CHANGE UP (ref 3.8–10.5)

## 2018-02-24 RX ORDER — ACETAMINOPHEN 500 MG
2 TABLET ORAL
Qty: 0 | Refills: 0 | COMMUNITY
Start: 2018-02-24

## 2018-02-24 RX ORDER — DONEPEZIL HYDROCHLORIDE 10 MG/1
1 TABLET, FILM COATED ORAL
Qty: 0 | Refills: 0 | COMMUNITY
Start: 2018-02-24

## 2018-02-24 RX ORDER — MEMANTINE HYDROCHLORIDE 10 MG/1
1 TABLET ORAL
Qty: 0 | Refills: 0 | COMMUNITY
Start: 2018-02-24

## 2018-02-24 RX ORDER — PANTOPRAZOLE SODIUM 20 MG/1
1 TABLET, DELAYED RELEASE ORAL
Qty: 0 | Refills: 0 | COMMUNITY
Start: 2018-02-24

## 2018-02-24 RX ORDER — OXYCODONE HYDROCHLORIDE 5 MG/1
1 TABLET ORAL
Qty: 0 | Refills: 0 | COMMUNITY

## 2018-02-24 RX ORDER — ESCITALOPRAM OXALATE 10 MG/1
1 TABLET, FILM COATED ORAL
Qty: 0 | Refills: 0 | COMMUNITY
Start: 2018-02-24

## 2018-02-24 RX ORDER — FERROUS SULFATE 325(65) MG
1 TABLET ORAL
Qty: 0 | Refills: 0 | COMMUNITY

## 2018-02-24 RX ORDER — CALCIUM CARBONATE 500(1250)
1 TABLET ORAL
Qty: 0 | Refills: 0 | COMMUNITY
Start: 2018-02-24

## 2018-02-24 RX ORDER — CALCIUM CARBONATE 500(1250)
1 TABLET ORAL
Qty: 0 | Refills: 0 | COMMUNITY

## 2018-02-24 RX ORDER — GABAPENTIN 400 MG/1
1 CAPSULE ORAL
Qty: 0 | Refills: 0 | COMMUNITY
Start: 2018-02-24

## 2018-02-24 RX ORDER — ACETAMINOPHEN 500 MG
2 TABLET ORAL
Qty: 0 | Refills: 0 | COMMUNITY

## 2018-02-24 RX ORDER — OXYCODONE HYDROCHLORIDE 5 MG/1
1 TABLET ORAL
Qty: 0 | Refills: 0 | COMMUNITY
Start: 2018-02-24

## 2018-02-24 RX ORDER — LEVOTHYROXINE SODIUM 125 MCG
1 TABLET ORAL
Qty: 0 | Refills: 0 | COMMUNITY
Start: 2018-02-24

## 2018-02-24 RX ORDER — FUROSEMIDE 40 MG
1 TABLET ORAL
Qty: 0 | Refills: 0 | COMMUNITY

## 2018-02-24 RX ADMIN — PANTOPRAZOLE SODIUM 40 MILLIGRAM(S): 20 TABLET, DELAYED RELEASE ORAL at 06:12

## 2018-02-24 RX ADMIN — SODIUM CHLORIDE 3 MILLILITER(S): 9 INJECTION INTRAMUSCULAR; INTRAVENOUS; SUBCUTANEOUS at 04:44

## 2018-02-24 RX ADMIN — Medication 88 MICROGRAM(S): at 05:16

## 2018-02-24 RX ADMIN — Medication 1 TABLET(S): at 11:13

## 2018-02-24 RX ADMIN — ENOXAPARIN SODIUM 30 MILLIGRAM(S): 100 INJECTION SUBCUTANEOUS at 11:13

## 2018-02-24 RX ADMIN — SODIUM CHLORIDE 3 MILLILITER(S): 9 INJECTION INTRAMUSCULAR; INTRAVENOUS; SUBCUTANEOUS at 12:59

## 2018-02-24 RX ADMIN — DONEPEZIL HYDROCHLORIDE 10 MILLIGRAM(S): 10 TABLET, FILM COATED ORAL at 22:37

## 2018-02-24 RX ADMIN — MEMANTINE HYDROCHLORIDE 5 MILLIGRAM(S): 10 TABLET ORAL at 11:13

## 2018-02-24 RX ADMIN — ESCITALOPRAM OXALATE 10 MILLIGRAM(S): 10 TABLET, FILM COATED ORAL at 11:13

## 2018-02-24 RX ADMIN — SODIUM CHLORIDE 75 MILLILITER(S): 9 INJECTION INTRAMUSCULAR; INTRAVENOUS; SUBCUTANEOUS at 06:38

## 2018-02-24 RX ADMIN — GABAPENTIN 100 MILLIGRAM(S): 400 CAPSULE ORAL at 18:20

## 2018-02-24 RX ADMIN — GABAPENTIN 100 MILLIGRAM(S): 400 CAPSULE ORAL at 05:16

## 2018-02-24 RX ADMIN — SODIUM CHLORIDE 3 MILLILITER(S): 9 INJECTION INTRAMUSCULAR; INTRAVENOUS; SUBCUTANEOUS at 22:37

## 2018-02-24 RX ADMIN — Medication 325 MILLIGRAM(S): at 11:13

## 2018-02-24 NOTE — DISCHARGE NOTE ADULT - PATIENT PORTAL LINK FT
You can access the [facility] Portal, offered by [facility], by registering with the following website: [email or web address]

## 2018-02-24 NOTE — PROGRESS NOTE ADULT - ASSESSMENT
85y Female whom presented to the hospital with a history of CAD/VHD, OA, HLD, HTN, AF with renal evaluation of AK in the setting of fall, possible syncope and noted with pubic rami fracture. Clinically appears pre-renal likely from limited intake, diuretic use. ATN possibilty with soft bp.    JC  -Maintain MAP as able, anti-htn on hold  -May have a plateau of function today, hopeful for downtrend in AM  -IVF to keep net positive/improve bp  -Urine studies   -renal imaging pending  -NSAID avoidance  -Labs in AM    [facility]  -Dr. Lujan following  -Monitor volume status closely with IVF, currently appears near euvolemic    Pubic rami fracture  -Ortho/PT      d/c with staff  d/c with Dr. Black

## 2018-02-24 NOTE — DISCHARGE NOTE ADULT - CARE PROVIDERS DIRECT ADDRESSES
,DirectAddress_Unknown,DirectAddress_Unknown,[email or web address]

## 2018-02-24 NOTE — PROGRESS NOTE ADULT - SUBJECTIVE AND OBJECTIVE BOX
CC:Patient is a 85y old  Female who presents with a chief complaint of I fell at home (24 Feb 2018 09:51)      Subjective:  Pt seen and examined at bedside with chaperone. Pt is awake, alert, cooperative, comfortable, confused with dementia, pt in no acute distress. No reported c/o fever, chills, chest pain, SOB, abd pain, N/V/D, hemoptysis, hematemesis, hematuria, hematochexia, headache, diplopia, vertigo, dizzyness. Pt tolerating diet, (+) void, (+) bowel function, fall risk precautions    ROS:  limited ROS secondary to dementia      Vital Signs Last 24 Hrs  T(C): 36.4 (24 Feb 2018 10:46), Max: 36.6 (23 Feb 2018 17:53)  T(F): 97.5 (24 Feb 2018 10:46), Max: 97.9 (23 Feb 2018 17:53)  HR: 62 (24 Feb 2018 10:46) (60 - 62)  BP: 108/53 (24 Feb 2018 10:46) (89/48 - 108/53)  BP(mean): --  RR: 18 (24 Feb 2018 10:46) (18 - 18)  SpO2: 94% (24 Feb 2018 10:46) (93% - 94%)    Labs:                      11.8   5.3   )-----------( 78       ( 24 Feb 2018 05:50 )             35.4     CBC Full  -  ( 24 Feb 2018 05:50 )  WBC Count : 5.3 K/uL  Hemoglobin : 11.8 g/dL  Hematocrit : 35.4 %  Platelet Count - Automated : 78 K/uL  Mean Cell Volume : 99.8 fl  Mean Cell Hemoglobin : 33.2 pg  Mean Cell Hemoglobin Concentration : 33.3 gm/dL  Auto Neutrophil # : 3.5 K/uL  Auto Lymphocyte # : 0.9 K/uL  Auto Monocyte # : 0.6 K/uL  Auto Eosinophil # : 0.1 K/uL  Auto Basophil # : 0.1 K/uL  Auto Neutrophil % : 67.0 %  Auto Lymphocyte % : 16.9 %  Auto Monocyte % : 12.1 %  Auto Eosinophil % : 2.7 %  Auto Basophil % : 1.3 %    02-24    138  |  106  |  43<H>  ----------------------------<  89  4.0   |  24  |  2.47<H>    Ca    7.9<L>      24 Feb 2018 05:50  Phos  4.2     02-24  Mg     2.2     02-24              Meds:  acetaminophen   Tablet 650 milliGRAM(s) Oral every 6 hours PRN  calcium carbonate 500 mG (Tums) Chewable 1 Tablet(s) Chew daily  donepezil 10 milliGRAM(s) Oral at bedtime  enoxaparin Injectable 30 milliGRAM(s) SubCutaneous daily  escitalopram 10 milliGRAM(s) Oral daily  ferrous    sulfate 325 milliGRAM(s) Oral daily  gabapentin 100 milliGRAM(s) Oral two times a day  levothyroxine 88 MICROGram(s) Oral daily  memantine 5 milliGRAM(s) Oral daily  multivitamin 1 Tablet(s) Oral daily  ondansetron Injectable 4 milliGRAM(s) IV Push every 6 hours PRN  oxyCODONE    IR 10 milliGRAM(s) Oral three times a day PRN  pantoprazole    Tablet 40 milliGRAM(s) Oral before breakfast  sodium chloride 0.9% lock flush 3 milliLiter(s) IV Push every 8 hours  sodium chloride 0.9%. 1000 milliLiter(s) IV Continuous <Continuous>      Radiology:      Physical exam:  GCS of 14  Pt in no acute distress  Airway is patent  Breathing is symmetric and unlabored  CN II-XII grossly intact  HEENT: normocephalic, RUSTY, EOM wnl, no gross craniofacial bony patholgy to exam  Neck: No tracheal deviation, no JVD, no crepitus, no ecchymosis, no hematoma  Chest: No gross rib or sternal pathology or tenderness to exam, no crepitus, no ecchymosis, no hematoma  Resp: CTAB  CVS: S1S2(+)  ABD: bowel sounds (+), soft, nontender, non distended, no rebound, no guarding, no rigidity, no pelvic instability to exam (+) tenderness to left pelvic region from known fracture pathology  EXT: no gross calf tenderness or edema to exam b/l, pt has good capillary refill in all digits. Sensoromotor function grossly intact, on VTE prophylaxis  Skin: no adverse skin changes to exam

## 2018-02-24 NOTE — DISCHARGE NOTE ADULT - HOSPITAL COURSE
pt s/p fall with resultant pelvic fracture. Pt remained hemodynamically stable throughout hospital stay and admission

## 2018-02-24 NOTE — DISCHARGE NOTE ADULT - MEDICATION SUMMARY - MEDICATIONS TO STOP TAKING
I will STOP taking the medications listed below when I get home from the hospital:    furosemide 40 mg oral tablet  -- 1 tab(s) by mouth once a day **Alternating with 20 mg    furosemide 20 mg oral tablet  -- 1 tab(s) by mouth once a day **Alternating with 40 mg

## 2018-02-24 NOTE — DISCHARGE NOTE ADULT - ADDITIONAL INSTRUCTIONS
pt to follow up with primary medical doctor and private cardiologist as outpatient. Pt to seek immediate medical attention for chest pain, shortness of breath, any adverse changes to health

## 2018-02-24 NOTE — DISCHARGE NOTE ADULT - NS AS ACTIVITY OBS
Walking-Indoors allowed/No Heavy lifting/straining/Do not drive or operate machinery/Bathing allowed/Showering allowed/Walking-Outdoors allowed/Do not make important decisions/take all appropriate fall risk precautions

## 2018-02-24 NOTE — PROGRESS NOTE ADULT - SUBJECTIVE AND OBJECTIVE BOX
Patient is a 85y Female who reports no complaints overnight. Sitting in chair. Still pain but stable    REVIEW OF SYSTEMS:    CONSTITUTIONAL: stable weakness, no fevers or chills  RESPIRATORY: No cough, wheezing, hemoptysis; No shortness of breath  CARDIOVASCULAR: No chest pain or palpitations  GENITOURINARY: No dysuria, frequency or hematuria  All other review of systems is negative unless indicated above.    MEDICATIONS  (STANDING):  calcium carbonate 500 mG (Tums) Chewable 1 Tablet(s) Chew daily  donepezil 10 milliGRAM(s) Oral at bedtime  enoxaparin Injectable 30 milliGRAM(s) SubCutaneous daily  escitalopram 10 milliGRAM(s) Oral daily  ferrous    sulfate 325 milliGRAM(s) Oral daily  gabapentin 100 milliGRAM(s) Oral two times a day  levothyroxine 88 MICROGram(s) Oral daily  memantine 5 milliGRAM(s) Oral daily  multivitamin 1 Tablet(s) Oral daily  pantoprazole    Tablet 40 milliGRAM(s) Oral before breakfast  sodium chloride 0.9% lock flush 3 milliLiter(s) IV Push every 8 hours  sodium chloride 0.9%. 1000 milliLiter(s) (75 mL/Hr) IV Continuous <Continuous>    MEDICATIONS  (PRN):  acetaminophen   Tablet 650 milliGRAM(s) Oral every 6 hours PRN pain and temp  ondansetron Injectable 4 milliGRAM(s) IV Push every 6 hours PRN Nausea and/or Vomiting  oxyCODONE    IR 10 milliGRAM(s) Oral three times a day PRN Severe Pain (7 - 10)        T(C): , Max: 37 (02-23-18 @ 12:15)  T(F): , Max: 98.6 (02-23-18 @ 12:15)  HR: 62 (02-24-18 @ 10:46)  BP: 108/53 (02-24-18 @ 10:46)  BP(mean): --  RR: 18 (02-24-18 @ 10:46)  SpO2: 94% (02-24-18 @ 10:46)  Wt(kg): --    02-23 @ 07:01  -  02-24 @ 07:00  --------------------------------------------------------  IN: 900 mL / OUT: 300 mL / NET: 600 mL          PHYSICAL EXAM:    Constitutional: NAD, frail  HEENT: PERRLA, EOMI,  MMM  Neck: No LAD, No JVD  Respiratory: dist bs, no creps  Cardiovascular: S1 and S2, RRR  Gastrointestinal: BS+, soft, NT/ND  Extremities: tr peripheral edema  Neurological: Alert and conversant  : No Pacheco  Skin: No rashes  Access: Not applicable        LABS:                        11.8   5.3   )-----------( 78       ( 24 Feb 2018 05:50 )             35.4     24 Feb 2018 05:50    138    |  106    |  43     ----------------------------<  89     4.0     |  24     |  2.47   23 Feb 2018 08:41    139    |  107    |  39     ----------------------------<  88     4.2     |  24     |  2.42   21 Feb 2018 07:27    136    |  101    |  22     ----------------------------<  90     3.9     |  29     |  1.44     Ca    7.9        24 Feb 2018 05:50  Ca    8.1        23 Feb 2018 08:41  Ca    8.8        21 Feb 2018 07:27  Phos  4.2       24 Feb 2018 05:50  Phos  4.7       23 Feb 2018 08:41  Mg     2.2       24 Feb 2018 05:50  Mg     2.1       23 Feb 2018 08:41  Mg     2.0       21 Feb 2018 07:27    TPro  7.7    /  Alb  4.1    /  TBili  1.1    /  DBili  x      /  AST  30     /  ALT  24     /  AlkPhos  177    21 Feb 2018 07:27          Urine Studies:          RADIOLOGY & ADDITIONAL STUDIES:

## 2018-02-24 NOTE — DISCHARGE NOTE ADULT - CARE PROVIDER_API CALL
primary medical doctor,   Phone: (   )    -  Fax: (   )    -    private cardiologist,   Phone: (   )    -  Fax: (   )    -    Patrick Rodriguez (DO), Surgery; Surgical Critical Care  [address] [place] [address]  Phone: [phone]  Fax: [phone] Patrick Rodriguez (DO), Surgery; Surgical Critical Care  [address] [place] [address]  Phone: [phone]  Fax: [phone]    Kelya Sifuentes), Internal Medicine  [address] [place] [address]  Phone: [phone]  Fax: [phone]    Elias Lujan), Cardiovascular Disease; Internal Medicine  [address] [place] [address]  Phone: [phone]  Fax: [phone]

## 2018-02-24 NOTE — PROGRESS NOTE ADULT - ASSESSMENT
A/P:  Left pelvic fracture  Medical comorbidities of Dementia, Anemia, Atrial Fibrillation, CAD, Cardiac Arrest  2007, HTN, Hyperlipidemia, Hypothyroid, Osteoarthritis of spine, joints, Spinal Stenosis.  GI/DVT prophylaxis  Pain control  F/U labs  Cardiology consult  Nephrology on consult  Pt transferred to Hospitalist service for medical management  Pt stable and cleared from trauma surgical standpoint  Reconsult prn surgical needs  Cont current care and meds  Social service for d/c planning

## 2018-02-24 NOTE — DISCHARGE NOTE ADULT - CARE PLAN
Principal Discharge DX:	Pelvic fracture  Goal:	pain control, hemodynamic stability  Assessment and plan of treatment:	pt to follow up with primary medical doctor and private cardiologist as outpatient. Pt to seek immediate medical attention for chest pain, shortness of breath, any adverse changes to health

## 2018-02-24 NOTE — DISCHARGE NOTE ADULT - PLAN OF CARE
pain control, hemodynamic stability pt to follow up with primary medical doctor and private cardiologist as outpatient. Pt to seek immediate medical attention for chest pain, shortness of breath, any adverse changes to health

## 2018-02-24 NOTE — DISCHARGE NOTE ADULT - MEDICATION SUMMARY - MEDICATIONS TO TAKE
I will START or STAY ON the medications listed below when I get home from the hospital:    acetaminophen 325 mg oral tablet  -- 2 tab(s) by mouth every 6 hours, As needed, pain and temp  -- Indication: For mild pain    oxyCODONE 10 mg oral tablet  -- 1 tab(s) by mouth 3 times a day, As needed, Severe Pain (7 - 10)  -- Indication: For Pain    losartan 25 mg oral tablet  -- 1 tab(s) by mouth once a day  -- Indication: For hypertension    calcium carbonate 500 mg (200 mg elemental calcium) oral tablet, chewable  -- 1 tab(s) by mouth once a day  -- Indication: For Antacid    gabapentin 100 mg oral capsule  -- 1 cap(s) by mouth 2 times a day  -- Indication: For Pain    escitalopram 10 mg oral tablet  -- 1 tab(s) by mouth once a day  -- Indication: For Antidepressant    atenolol 50 mg oral tablet  -- 1 tab(s) by mouth once a day  -- Indication: For hyppertension    donepezil 10 mg oral tablet  -- 1 tab(s) by mouth once a day (at bedtime)  -- Indication: For Alzeihmers    memantine 5 mg oral tablet  -- 1 tab(s) by mouth once a day  -- Indication: For Alzeihmers    pantoprazole 40 mg oral delayed release tablet  -- 1 tab(s) by mouth once a day (before a meal)  -- Indication: For gerd    levothyroxine 88 mcg (0.088 mg) oral tablet  -- 1 tab(s) by mouth once a day  -- Indication: For hypothroidism    Multiple Vitamins oral tablet  -- 1 tab(s) by mouth once a day  -- Indication: For nutrition I will START or STAY ON the medications listed below when I get home from the hospital:    acetaminophen 325 mg oral tablet  -- 2 tab(s) by mouth every 6 hours, As needed, pain and temp  -- Indication: For mild pain    oxyCODONE 10 mg oral tablet  -- 1 tab(s) by mouth 3 times a day, As needed, Severe Pain (7 - 10)  -- Indication: For Pain    aspirin 81 mg oral tablet  -- 1 tab(s) by mouth once a day  -- Indication: For CAD    losartan 25 mg oral tablet  -- 1 tab(s) by mouth once a day  -- Indication: For hypertension    calcium carbonate 500 mg (200 mg elemental calcium) oral tablet, chewable  -- 1 tab(s) by mouth once a day  -- Indication: For Antacid    gabapentin 100 mg oral capsule  -- 1 cap(s) by mouth 2 times a day  -- Indication: For Pain    escitalopram 10 mg oral tablet  -- 1 tab(s) by mouth once a day  -- Indication: For Antidepressant    atenolol 50 mg oral tablet  -- 1 tab(s) by mouth once a day  -- Indication: For hyppertension    donepezil 10 mg oral tablet  -- 1 tab(s) by mouth once a day (at bedtime)  -- Indication: For Alzeihmers    memantine 5 mg oral tablet  -- 1 tab(s) by mouth once a day  -- Indication: For Alzeihmers    pantoprazole 40 mg oral delayed release tablet  -- 1 tab(s) by mouth once a day (before a meal)  -- Indication: For gerd    levothyroxine 88 mcg (0.088 mg) oral tablet  -- 1 tab(s) by mouth once a day  -- Indication: For hypothroidism    Multiple Vitamins oral tablet  -- 1 tab(s) by mouth once a day  -- Indication: For nutrition

## 2018-02-24 NOTE — PROGRESS NOTE ADULT - SUBJECTIVE AND OBJECTIVE BOX
c/c: fall    HPI: 85F, pmh of dementia, spinal stenosis, CAD, CABG, Cardiac ablation, PPM, DM, Atrial fibrillation, HTN, GERD, hypothyroidism who presented to the ER after a fall. She was apparently found by her son. EMS was called and was brought to the ER. In ED found to have left superior/inferior pubic rami fractures and was admitted to trauma service. Hospitalist service consulted for medical comanagement. Events leading to fall are unclear. Patient has dementia and cannot provide accurate history.  Hospital course notable for hypotension/jc    2/24: pt seen and examined this am. Felt well. No complaints. Renal function slightly worse. Per d/w RN, able to void when on the commode.    ROS: all 10 systems reviewed and is as above otherwise negative, but limited due to dementia    Vital Signs Last 24 Hrs  T(C): 36.4 (24 Feb 2018 10:46), Max: 37 (23 Feb 2018 12:15)  T(F): 97.5 (24 Feb 2018 10:46), Max: 98.6 (23 Feb 2018 12:15)  HR: 62 (24 Feb 2018 10:46) (60 - 62)  BP: 108/53 (24 Feb 2018 10:46) (89/48 - 108/53)  RR: 18 (24 Feb 2018 10:46) (18 - 18)  SpO2: 94% (24 Feb 2018 10:46) (93% - 96%)  PHYSICAL EXAM:    GENERAL: Elderly female, laying in bed, Comfortable, no acute distress   HEAD:  Normocephalic, atraumatic  EYES: EOMI, PERRLA  HEENT: moist  mucous membranes  NECK: Supple, No JVD  NERVOUS SYSTEM:  confused, non focal  CHEST/LUNG: Clear to auscultation bilaterally  HEART:s1, s2+ Regular rate and rhythm  ABDOMEN: Soft, Nontender, Nondistended, Bowel sounds present  GENITOURINARY: Voiding, no palpable bladder  EXTREMITIES:   No clubbing, cyanosis. trace b/l le edema  MUSCULOSKELETAL- Normal tone  SKIN-no rash  LABS:                        11.8   5.3   )-----------( 78       ( 24 Feb 2018 05:50 )             35.4     02-24    138  |  106  |  43<H>  ----------------------------<  89  4.0   |  24  |  2.47<H>    Ca    7.9<L>      24 Feb 2018 05:50  Phos  4.2     02-24  Mg     2.2     02-24      ASSESSMENT AND PLAN:    1. Unwitnessed fall with left superior/inferior pubic rami fractures:  -can not rule out syncope  -noted to be hypotensive  -continue to hold diuretics/antihypertensives  - ppm interogation unremarkable.  -cardio consult appreciated, f/u echo  -CT head negative  -pain control  -physical therapy    2. JC:  -?atn from hypotension  -continue to hold diuretic/arb  -ivf-->monitor for s/s of chf  -renal sono  -nephrology eval appreciated    3. H/o HTN now with hypotension:  -hold arb/lasix/bb  -ivf  -f/u echo  -check am cortisol.    4. HLD:  -statin    5. Atrial fibrillation:  -rate controlled  -hold bb for hypotension  -does not appear to be on a/c and would continue to hold off due to fall risk    6. ?CHF:  -hold lasix  -check echo    7. H/o CAD/CABG/MItral valve repair:  -hold bb for hypotension  -statin    8. Dementia:  -aricept/namenda    9. Depression:  -continue lexapro    10, Hypothyroid:  -continue synthroid    11. Thrombocytopenia:  -?new  -monitor c/c: fall    HPI: 85F, pmh of dementia, spinal stenosis, CAD, CABG, Cardiac ablation, PPM, DM, Atrial fibrillation, HTN, GERD, hypothyroidism who presented to the ER after a fall. She was apparently found by her son. EMS was called and was brought to the ER. In ED found to have left superior/inferior pubic rami fractures and was admitted to trauma service. Hospitalist service consulted for medical comanagement. Events leading to fall are unclear. Patient has dementia and cannot provide accurate history.  Hospital course notable for hypotension/jc    2/24: pt seen and examined this am. Felt well. No complaints. Renal function slightly worse. Per d/w RN, able to void when on the commode.    ROS: all 10 systems reviewed and is as above otherwise negative, but limited due to dementia    Vital Signs Last 24 Hrs  T(C): 36.4 (24 Feb 2018 10:46), Max: 37 (23 Feb 2018 12:15)  T(F): 97.5 (24 Feb 2018 10:46), Max: 98.6 (23 Feb 2018 12:15)  HR: 62 (24 Feb 2018 10:46) (60 - 62)  BP: 108/53 (24 Feb 2018 10:46) (89/48 - 108/53)  RR: 18 (24 Feb 2018 10:46) (18 - 18)  SpO2: 94% (24 Feb 2018 10:46) (93% - 96%)  PHYSICAL EXAM:    GENERAL: Elderly female, laying in bed, Comfortable, no acute distress   HEAD:  Normocephalic, atraumatic  EYES: EOMI, PERRLA  HEENT: moist  mucous membranes  NECK: Supple, No JVD  NERVOUS SYSTEM:  confused, non focal  CHEST/LUNG: Clear to auscultation bilaterally  HEART:s1, s2+ Regular rate and rhythm  ABDOMEN: Soft, Nontender, Nondistended, Bowel sounds present  GENITOURINARY: Voiding, no palpable bladder  EXTREMITIES:   No clubbing, cyanosis. trace b/l le edema  MUSCULOSKELETAL- Normal tone  SKIN-no rash  LABS:                        11.8   5.3   )-----------( 78       ( 24 Feb 2018 05:50 )             35.4     02-24    138  |  106  |  43<H>  ----------------------------<  89  4.0   |  24  |  2.47<H>    Ca    7.9<L>      24 Feb 2018 05:50  Phos  4.2     02-24  Mg     2.2     02-24      ASSESSMENT AND PLAN:    1. Unwitnessed fall with left superior/inferior pubic rami fractures:  -can not rule out syncope  -noted to be hypotensive  -continue to hold diuretics/antihypertensives  - ppm interogation unremarkable.  -cardio consult appreciated, f/u echo  -CT head negative  -pain control  -physical therapy    2. JC:  -?atn from hypotension  -continue to hold diuretic/arb  -ivf-->monitor for s/s of chf  -renal sono  -nephrology eval appreciated    3. H/o HTN now with hypotension:  -hold arb/lasix/bb  -ivf  -f/u echo  -check am cortisol.    4. HLD:  -statin    5. Atrial fibrillation:  -rate controlled  -hold bb for hypotension  -does not appear to be on a/c and would continue to hold off due to fall risk    6. ?CHF:  -hold lasix  -check echo    7. H/o CAD/CABG/MItral valve repair:  -hold bb for hypotension  -statin    8. Dementia:  -aricept/namenda    9. Depression:  -continue lexapro    10, Hypothyroid:  -continue synthroid    11. Thrombocytopenia:  -?new  -monitor  -will need outpt f/u

## 2018-02-24 NOTE — [facility] QUERY NOTE - NSCDICHFTXTBX_GEN_ALL_CORE_HH
Documentation on chart of ? CHF. Patient is on Lasix 20 mgs po at home. Diuretics on hold while in the hospital. Please indicate the type and acuity of CHF for your patient.

## 2018-02-24 NOTE — PROGRESS NOTE ADULT - SUBJECTIVE AND OBJECTIVE BOX
CHIEF COMPLAINT:    HPI:  · Chief Complaint: The patient is a 85y Female complaining of fall.	  · Unable to Obtain: Dementia	  · HPI Objective Statement: pt found down on floor by son this morning.  pt cannot say what happened.  son states that although she cannot remember what happened she appears normal to him right now.  no recent illness or increased confusion.  pt c/o pain to L groin	  Patient is a poor historian, no complaints at the time of exam.  Pain controlled.    EKG on admission showed V pacing rate 69. PM interrogation showed V pacing with normal VVIR PM function. Hgb has gone down from 13.5 to 12.0(). Renal function abnormal, pt on IVF, probable dehydration. No chest pain.    : creat and BUN higher. Hgb stable now. BP low. On IVF.    Vital Signs Last 24 Hrs  T(C): 37.3 (2018 14:37), Max: 37.3 (2018 14:37)  T(F): 99.1 (2018 14:37), Max: 99.1 (2018 14:37)  HR: 60 (2018 14:15) (60 - 84)  BP: 77/44 (2018 14:15) (77/44 - 119/75)  BP(mean): --  RR: 17 (2018 12:07) (16 - 18)  SpO2: 98% (2018 14:15) (96% - 100%) (2018 14:50)      PAST MEDICAL & SURGICAL HISTORY:  Anemia  Cataract: implants   Cardiac Arrest: - lead to CABG and mitral valve repair  Spinal Stenosis  Osteoarthritis: spine, joints  Hypothyroid: last TFT&#x27;s 2 weeks ago - normal  CAD (Coronary Artery Disease) of Bypass Graft: bypass times 1,  2007 [facility]  Mitral valve repair: CAD 2007 placed on coumadin to remain as per Dr Fernandez  Hyperlipidemia  HTN - Hypertension:   Pacemaker: LACW  - Medtronic ADDROI/ [id number] - [facility]  Atrial Fibrillation:   on baby aspirin to maintain as per Dr Fernandez  Depression: 3 years  History of Tonsillectomy: as child  Blepharoptosis:  - bilateral  partial thyroidectomy:   Breast Cyst: left - benign 1962  hysterectomy: 1967  Facet block: of spine   Spinal Disorder: spinal stimulator trial  - then removed  pacemaker: LACW  Medtronic ADDROI/[id number]  mitral valve repair/ CAB bypass times 1 - [facility]      Allergies    amoxicillin (Rash)  Daypro (Other)  Keflex (Rash)  Tikosyn (Unknown)    Intolerances        Occupation:  Alochol: Denied  Smoking: Denied  Drug Use: Denied  Marital Status:         FAMILY HISTORY:  No pertinent family history in first degree relatives      REVIEW OF SYSTEMS:    CONSTITUTIONAL: No weakness, fevers or chills  EYES/ENT: No visual changes;  No vertigo or throat pain   NECK: No pain or stiffness  RESPIRATORY: No cough, wheezing, hemoptysis; No shortness of breath  CARDIOVASCULAR: No chest pain or palpitations  GASTROINTESTINAL: No abdominal or epigastric pain. No nausea, vomiting, or hematemesis; No diarrhea or constipation. No melena or hematochezia.  GENITOURINARY: No dysuria, frequency or hematuria  NEUROLOGICAL: No numbness or weakness  SKIN: No itching, burning, rashes, or lesions   All other review of systems is negative unless indicated above    Vital Signs Last 24 Hrs  T(C): 37 (2018 05:26), Max: 37.3 (2018 19:51)  T(F): 98.6 (2018 05:26), Max: 99.1 (2018 19:51)  HR: 61 (2018 05:26) (57 - 61)  BP: 90/40 (2018 05:26) (87/41 - 93/42)  BP(mean): --  RR: 17 (2018 05:26) (16 - 17)  SpO2: 96% (2018 05:26) (96% - 98%)    I&O's Summary    2018 07:01  -  2018 07:00  --------------------------------------------------------  IN: 240 mL / OUT: 0 mL / NET: 240 mL    Hg    PHYSICAL EXAM:    Constitutional: NAD, awake and alert, well-developed  HEENT: PERR, EOMI,  No oral cyananosis.  Neck:  supple,  No JVD  Respiratory: Breath sounds are clear bilaterally, No wheezing, rales or rhonchi  Cardiovascular: S1 and S2, regular rate and rhythm, no Murmurs, gallops or rubs  Gastrointestinal: Bowel Sounds present, soft, nontender.   Extremities: No peripheral edema. No clubbing or cyanosis.  Vascular: 2+ peripheral pulses  Neurological: A/O x 3, no focal deficits  Musculoskeletal: no calf tenderness.  Skin: No rashes.    MEDICATIONS:  MEDICATIONS  (STANDING):  calcium carbonate 500 mG (Tums) Chewable 1 Tablet(s) Chew daily  donepezil 10 milliGRAM(s) Oral at bedtime  enoxaparin Injectable 30 milliGRAM(s) SubCutaneous daily  escitalopram 10 milliGRAM(s) Oral daily  ferrous    sulfate 325 milliGRAM(s) Oral daily  gabapentin 100 milliGRAM(s) Oral two times a day  levothyroxine 88 MICROGram(s) Oral daily  memantine 5 milliGRAM(s) Oral daily  multivitamin 1 Tablet(s) Oral daily  pantoprazole    Tablet 40 milliGRAM(s) Oral before breakfast  sodium chloride 0.9% lock flush 3 milliLiter(s) IV Push every 8 hours  sodium chloride 0.9%. 1000 milliLiter(s) (75 mL/Hr) IV Continuous <Continuous>      LABS: All Labs Reviewed:                        12.0   4.8   )-----------( 83       ( 2018 02:11 )             36.3                         13.5   7.1   )-----------( 103      ( 2018 07:27 )             43.2     2018 07:27    136    |  101    |  22     ----------------------------<  90     3.9     |  29     |  1.44     Ca    8.8        2018 07:27  Mg     2.0       2018 07:27    TPro  7.7    /  Alb  4.1    /  TBili  1.1    /  DBili  x      /  AST  30     /  ALT  24     /  AlkPhos  177    2018 07:27          Blood Culture: Organism --  Gram Stain Blood -- Gram Stain --  Specimen Source .Urine None  Culture-Blood --            RADIOLOGY/EKG:

## 2018-02-25 LAB
ANION GAP SERPL CALC-SCNC: 7 MMOL/L — SIGNIFICANT CHANGE UP (ref 5–17)
BUN SERPL-MCNC: 35 MG/DL — HIGH (ref 7–23)
CALCIUM SERPL-MCNC: 8.1 MG/DL — LOW (ref 8.5–10.1)
CHLORIDE SERPL-SCNC: 109 MMOL/L — HIGH (ref 96–108)
CO2 SERPL-SCNC: 25 MMOL/L — SIGNIFICANT CHANGE UP (ref 22–31)
CREAT SERPL-MCNC: 1.44 MG/DL — HIGH (ref 0.5–1.3)
GLUCOSE SERPL-MCNC: 94 MG/DL — SIGNIFICANT CHANGE UP (ref 70–99)
MAGNESIUM SERPL-MCNC: 2.2 MG/DL — SIGNIFICANT CHANGE UP (ref 1.6–2.6)
PHOSPHATE SERPL-MCNC: 3 MG/DL — SIGNIFICANT CHANGE UP (ref 2.5–4.5)
POTASSIUM SERPL-MCNC: 4.2 MMOL/L — SIGNIFICANT CHANGE UP (ref 3.5–5.3)
POTASSIUM SERPL-SCNC: 4.2 MMOL/L — SIGNIFICANT CHANGE UP (ref 3.5–5.3)
SODIUM SERPL-SCNC: 141 MMOL/L — SIGNIFICANT CHANGE UP (ref 135–145)

## 2018-02-25 PROCEDURE — 76770 US EXAM ABDO BACK WALL COMP: CPT | Mod: 26

## 2018-02-25 RX ADMIN — OXYCODONE HYDROCHLORIDE 10 MILLIGRAM(S): 5 TABLET ORAL at 03:35

## 2018-02-25 RX ADMIN — DONEPEZIL HYDROCHLORIDE 10 MILLIGRAM(S): 10 TABLET, FILM COATED ORAL at 21:46

## 2018-02-25 RX ADMIN — GABAPENTIN 100 MILLIGRAM(S): 400 CAPSULE ORAL at 17:49

## 2018-02-25 RX ADMIN — ENOXAPARIN SODIUM 30 MILLIGRAM(S): 100 INJECTION SUBCUTANEOUS at 12:14

## 2018-02-25 RX ADMIN — GABAPENTIN 100 MILLIGRAM(S): 400 CAPSULE ORAL at 06:08

## 2018-02-25 RX ADMIN — Medication 1 TABLET(S): at 12:14

## 2018-02-25 RX ADMIN — Medication 1 TABLET(S): at 12:16

## 2018-02-25 RX ADMIN — SODIUM CHLORIDE 3 MILLILITER(S): 9 INJECTION INTRAMUSCULAR; INTRAVENOUS; SUBCUTANEOUS at 21:12

## 2018-02-25 RX ADMIN — OXYCODONE HYDROCHLORIDE 10 MILLIGRAM(S): 5 TABLET ORAL at 20:26

## 2018-02-25 RX ADMIN — Medication 325 MILLIGRAM(S): at 12:16

## 2018-02-25 RX ADMIN — Medication 88 MICROGRAM(S): at 06:56

## 2018-02-25 RX ADMIN — SODIUM CHLORIDE 3 MILLILITER(S): 9 INJECTION INTRAMUSCULAR; INTRAVENOUS; SUBCUTANEOUS at 14:23

## 2018-02-25 RX ADMIN — PANTOPRAZOLE SODIUM 40 MILLIGRAM(S): 20 TABLET, DELAYED RELEASE ORAL at 06:09

## 2018-02-25 RX ADMIN — ESCITALOPRAM OXALATE 10 MILLIGRAM(S): 10 TABLET, FILM COATED ORAL at 12:16

## 2018-02-25 RX ADMIN — MEMANTINE HYDROCHLORIDE 5 MILLIGRAM(S): 10 TABLET ORAL at 12:16

## 2018-02-25 RX ADMIN — SODIUM CHLORIDE 3 MILLILITER(S): 9 INJECTION INTRAMUSCULAR; INTRAVENOUS; SUBCUTANEOUS at 05:31

## 2018-02-25 NOTE — PROGRESS NOTE ADULT - SUBJECTIVE AND OBJECTIVE BOX
c/c: fall    HPI: 85F, pmh of dementia, spinal stenosis, CAD, CABG, Cardiac ablation, PPM, DM, Atrial fibrillation, HTN, GERD, hypothyroidism who presented to the ER after a fall. She was apparently found by her son. EMS was called and was brought to the ER. In ED found to have left superior/inferior pubic rami fractures and was admitted to trauma service. Hospitalist service consulted for medical comanagement. Events leading to fall are unclear. Patient has dementia and cannot provide accurate history.  Hospital course notable for hypotension/jc. Given ivf with improvement. Due to JC, patient transferred to hospitalist service.    2/25: pt seen and examined this am. Pain controlled. No sob/chest pain. Tolerating po.       ROS: all 10 systems reviewed and is as above otherwise negative, but limited due to dementia    Vital Signs Last 24 Hrs  T(C): 36.8 (24 Feb 2018 21:12), Max: 36.8 (24 Feb 2018 21:12)  T(F): 98.3 (24 Feb 2018 21:12), Max: 98.3 (24 Feb 2018 21:12)  HR: 80 (24 Feb 2018 21:12) (63 - 80)  BP: 135/70 (24 Feb 2018 21:12) (124/59 - 135/70)  RR: 17 (24 Feb 2018 21:12) (17 - 18)  SpO2: 93% (24 Feb 2018 21:12) (93% - 93%)    PHYSICAL EXAM:    GENERAL: Elderly female, laying in bed, Comfortable, no acute distress   HEAD:  Normocephalic, atraumatic  EYES: EOMI, PERRLA  HEENT: moist  mucous membranes  NECK: Supple, No JVD  NERVOUS SYSTEM:  confused, non focal  CHEST/LUNG: Clear to auscultation bilaterally  HEART:s1, s2+ Regular rate and rhythm  ABDOMEN: Soft, Nontender, Nondistended, Bowel sounds present  GENITOURINARY: Voiding, no palpable bladder  EXTREMITIES:   No clubbing, cyanosis. trace b/l le edema  MUSCULOSKELETAL- Normal tone  SKIN-no rash    LABS:                        11.8   5.3   )-----------( 78       ( 24 Feb 2018 05:50 )             35.4     02-25    141  |  109<H>  |  35<H>  ----------------------------<  94  4.2   |  25  |  1.44<H>    Ca    8.1<L>      25 Feb 2018 06:36  Phos  3.0     02-25  Mg     2.2     02-25      ASSESSMENT AND PLAN:    1. Unwitnessed fall with left superior/inferior pubic rami fractures:  -can not rule out syncope  -noted to be hypotensive-->finally improving.  -continue to hold diuretics/antihypertensives  - ppm interrogation unremarkable.  -cardio consult appreciated, f/u echo  -CT head negative  -pain control  -physical therapy    2. JC:  -?atn from hypotension  -continue to hold diuretic/arb  -improved from yesterday-->dc ivf.  -repeat bmp in am.   -f/u renal sono  -nephrology eval appreciated    3. H/o HTN now with hypotension:  - arb/lasix/bb on hold  -bp finally improved  -dc ivf  -f/u echo  -can restart antihypertensives slowly as tolerated if bp stable.    4. HLD:  -statin    5. Atrial fibrillation:  -rate controlled  -hold bb for hypotension  -does not appear to be on a/c and would continue to hold off due to fall risk    6. ?CHF:  -hold lasix  -check echo  -CAN NOT DETERMINE IF SYSTOLIC OR DIASTOLIC WITHOUT ECHO    7. H/o CAD/CABG/MItral valve repair:  -hold bb for hypotension  -statin    8. Dementia:  -aricept/namenda    9. Depression:  -continue lexapro    10, Hypothyroid:  -continue synthroid    11. Thrombocytopenia:  -?new  -monitor  -will need outpt f/u    Dispo:  f/u echo/renal sono  dc planning to rehab if renal function continues to improve and bp stable.

## 2018-02-25 NOTE — PROGRESS NOTE ADULT - ASSESSMENT
85y Female whom presented to the hospital with a history of CAD/VHD, OA, HLD, HTN, AF with renal evaluation of AK in the setting of fall, possible syncope and noted with pubic rami fracture. bp.    JC  -Renal function stabilizing  -no further IVF, keep net even  -NSAID avoidance  -trend labs    [facility]  -Dr. Lujan following  -Monitor volume status closely , near euvolemic    Pubic rami fracture  -Ortho/PT      d/c with staff  d/c with Dr. Black

## 2018-02-25 NOTE — PROGRESS NOTE ADULT - SUBJECTIVE AND OBJECTIVE BOX
CHIEF COMPLAINT:    HPI:  · Chief Complaint: The patient is a 85y Female complaining of fall.	  · Unable to Obtain: Dementia	  · HPI Objective Statement: pt found down on floor by son this morning.  pt cannot say what happened.  son states that although she cannot remember what happened she appears normal to him right now.  no recent illness or increased confusion.  pt c/o pain to L groin	  Patient is a poor historian, no complaints at the time of exam.  Pain controlled.    EKG on admission showed V pacing rate 69. PM interrogation showed V pacing with normal VVIR PM function. Hgb has gone down from 13.5 to 12.0(). Renal function abnormal, pt on IVF, probable dehydration. No chest pain.    : creat and BUN higher. Hgb stable now. BP low. On IVF.    : BP and renal function better today. No complaints.    Vital Signs Last 24 Hrs  T(C): 37.3 (2018 14:37), Max: 37.3 (2018 14:37)  T(F): 99.1 (2018 14:37), Max: 99.1 (2018 14:37)  HR: 60 (2018 14:15) (60 - 84)  BP: 77/44 (2018 14:15) (77/44 - 119/75)  BP(mean): --  RR: 17 (2018 12:07) (16 - 18)  SpO2: 98% (2018 14:15) (96% - 100%) (2018 14:50)      PAST MEDICAL & SURGICAL HISTORY:  Anemia  Cataract: implants   Cardiac Arrest: - lead to CABG and mitral valve repair  Spinal Stenosis  Osteoarthritis: spine, joints  Hypothyroid: last TFT&#x27;s 2 weeks ago - normal  CAD (Coronary Artery Disease) of Bypass Graft: bypass times 1,  2007 [facility]  Mitral valve repair: CAD 2007 placed on coumadin to remain as per Dr Fernandez  Hyperlipidemia  HTN - Hypertension:   Pacemaker: LACW  - Medtronic ADDROI/ [id number] - [facility]  Atrial Fibrillation:   on baby aspirin to maintain as per Dr Fernandez  Depression: 3 years  History of Tonsillectomy: as child  Blepharoptosis:  - bilateral  partial thyroidectomy:   Breast Cyst: left - benign   hysterectomy:   Facet block: of spine   Spinal Disorder: spinal stimulator trial  - then removed  pacemaker: LACW  Medtronic [id number]  mitral valve repair/ CAB bypass times 1 - [facility]      Allergies    amoxicillin (Rash)  Daypro (Other)  Keflex (Rash)  Tikosyn (Unknown)    Intolerances        Occupation:  Alochol: Denied  Smoking: Denied  Drug Use: Denied  Marital Status:         FAMILY HISTORY:  No pertinent family history in first degree relatives      REVIEW OF SYSTEMS:    CONSTITUTIONAL: No weakness, fevers or chills  EYES/ENT: No visual changes;  No vertigo or throat pain   NECK: No pain or stiffness  RESPIRATORY: No cough, wheezing, hemoptysis; No shortness of breath  CARDIOVASCULAR: No chest pain or palpitations  GASTROINTESTINAL: No abdominal or epigastric pain. No nausea, vomiting, or hematemesis; No diarrhea or constipation. No melena or hematochezia.  GENITOURINARY: No dysuria, frequency or hematuria  NEUROLOGICAL: No numbness or weakness  SKIN: No itching, burning, rashes, or lesions   All other review of systems is negative unless indicated above    Vital Signs Last 24 Hrs  T(C): 37 (2018 05:26), Max: 37.3 (2018 19:51)  T(F): 98.6 (2018 05:26), Max: 99.1 (2018 19:51)  HR: 61 (2018 05:26) (57 - 61)  BP: 90/40 (2018 05:26) (87/41 - 93/42)  BP(mean): --  RR: 17 (2018 05:26) (16 - 17)  SpO2: 96% (2018 05:26) (96% - 98%)    I&O's Summary    2018 07:01  -  2018 07:00  --------------------------------------------------------  IN: 240 mL / OUT: 0 mL / NET: 240 mL    Hg    PHYSICAL EXAM:    Constitutional: NAD, awake and alert, well-developed  HEENT: PERR, EOMI,  No oral cyananosis.  Neck:  supple,  No JVD  Respiratory: Breath sounds are clear bilaterally, No wheezing, rales or rhonchi  Cardiovascular: S1 and S2, regular rate and rhythm, no Murmurs, gallops or rubs  Gastrointestinal: Bowel Sounds present, soft, nontender.   Extremities: No peripheral edema. No clubbing or cyanosis.  Vascular: 2+ peripheral pulses  Neurological: A/O x 3, no focal deficits  Musculoskeletal: no calf tenderness.  Skin: No rashes.    MEDICATIONS:  MEDICATIONS  (STANDING):  calcium carbonate 500 mG (Tums) Chewable 1 Tablet(s) Chew daily  donepezil 10 milliGRAM(s) Oral at bedtime  enoxaparin Injectable 30 milliGRAM(s) SubCutaneous daily  escitalopram 10 milliGRAM(s) Oral daily  ferrous    sulfate 325 milliGRAM(s) Oral daily  gabapentin 100 milliGRAM(s) Oral two times a day  levothyroxine 88 MICROGram(s) Oral daily  memantine 5 milliGRAM(s) Oral daily  multivitamin 1 Tablet(s) Oral daily  pantoprazole    Tablet 40 milliGRAM(s) Oral before breakfast  sodium chloride 0.9% lock flush 3 milliLiter(s) IV Push every 8 hours  sodium chloride 0.9%. 1000 milliLiter(s) (75 mL/Hr) IV Continuous <Continuous>      LABS: All Labs Reviewed:                        12.0   4.8   )-----------( 83       ( 2018 02:11 )             36.3                         13.5   7.1   )-----------( 103      ( 2018 07:27 )             43.2     2018 07:27    136    |  101    |  22     ----------------------------<  90     3.9     |  29     |  1.44     Ca    8.8        2018 07:27  Mg     2.0       2018 07:27    TPro  7.7    /  Alb  4.1    /  TBili  1.1    /  DBili  x      /  AST  30     /  ALT  24     /  AlkPhos  177    2018 07:27          Blood Culture: Organism --  Gram Stain Blood -- Gram Stain --  Specimen Source .Urine None  Culture-Blood --            RADIOLOGY/EKG:

## 2018-02-25 NOTE — PROGRESS NOTE ADULT - SUBJECTIVE AND OBJECTIVE BOX
Patient is a 85y Female who reports no complaints overnight. pain stable    REVIEW OF SYSTEMS:    CONSTITUTIONAL: stable weakness, no fevers or chills  RESPIRATORY: No cough, wheezing, hemoptysis; No shortness of breath  CARDIOVASCULAR: No chest pain or palpitations  GENITOURINARY: No dysuria, frequency or hematuria  All other review of systems is negative unless indicated above.    MEDICATIONS  (STANDING):  calcium carbonate 500 mG (Tums) Chewable 1 Tablet(s) Chew daily  donepezil 10 milliGRAM(s) Oral at bedtime  enoxaparin Injectable 30 milliGRAM(s) SubCutaneous daily  escitalopram 10 milliGRAM(s) Oral daily  ferrous    sulfate 325 milliGRAM(s) Oral daily  gabapentin 100 milliGRAM(s) Oral two times a day  levothyroxine 88 MICROGram(s) Oral daily  memantine 5 milliGRAM(s) Oral daily  multivitamin 1 Tablet(s) Oral daily  pantoprazole    Tablet 40 milliGRAM(s) Oral before breakfast  sodium chloride 0.9% lock flush 3 milliLiter(s) IV Push every 8 hours    MEDICATIONS  (PRN):  acetaminophen   Tablet 650 milliGRAM(s) Oral every 6 hours PRN pain and temp  ondansetron Injectable 4 milliGRAM(s) IV Push every 6 hours PRN Nausea and/or Vomiting  oxyCODONE    IR 10 milliGRAM(s) Oral three times a day PRN Severe Pain (7 - 10)        T(C): , Max: 36.8 (02-24-18 @ 21:12)  T(F): , Max: 98.3 (02-24-18 @ 21:12)  HR: 80 (02-24-18 @ 21:12)  BP: 135/70 (02-24-18 @ 21:12)  BP(mean): --  RR: 17 (02-24-18 @ 21:12)  SpO2: 93% (02-24-18 @ 21:12)  Wt(kg): --    02-24 @ 07:01  -  02-25 @ 07:00  --------------------------------------------------------  IN: 850 mL / OUT: 300 mL / NET: 550 mL          PHYSICAL EXAM:    Constitutional: NAD, frail  HEENT: PERRLA, EOMI,  MMM  Neck: No LAD, No JVD  Respiratory good aeration  Cardiovascular: S1 and S2, RRR  Gastrointestinal: BS+, soft, NT/ND  Extremities: No peripheral edema  Neurological: Alert and conversant  : No Pacheco  Skin: No rashes  Access: Not applicable        LABS:                        11.8   5.3   )-----------( 78       ( 24 Feb 2018 05:50 )             35.4     25 Feb 2018 06:36    141    |  109    |  35     ----------------------------<  94     4.2     |  25     |  1.44   24 Feb 2018 05:50    138    |  106    |  43     ----------------------------<  89     4.0     |  24     |  2.47   23 Feb 2018 08:41    139    |  107    |  39     ----------------------------<  88     4.2     |  24     |  2.42     Ca    8.1        25 Feb 2018 06:36  Ca    7.9        24 Feb 2018 05:50  Ca    8.1        23 Feb 2018 08:41  Phos  3.0       25 Feb 2018 06:36  Phos  4.2       24 Feb 2018 05:50  Phos  4.7       23 Feb 2018 08:41  Mg     2.2       25 Feb 2018 06:36  Mg     2.2       24 Feb 2018 05:50  Mg     2.1       23 Feb 2018 08:41            Urine Studies:          RADIOLOGY & ADDITIONAL STUDIES:

## 2018-02-26 VITALS
SYSTOLIC BLOOD PRESSURE: 122 MMHG | DIASTOLIC BLOOD PRESSURE: 75 MMHG | RESPIRATION RATE: 16 BRPM | HEART RATE: 63 BPM | OXYGEN SATURATION: 95 % | TEMPERATURE: 98 F

## 2018-02-26 LAB
ANION GAP SERPL CALC-SCNC: 9 MMOL/L — SIGNIFICANT CHANGE UP (ref 5–17)
BUN SERPL-MCNC: 24 MG/DL — HIGH (ref 7–23)
CALCIUM SERPL-MCNC: 8.4 MG/DL — LOW (ref 8.5–10.1)
CHLORIDE SERPL-SCNC: 112 MMOL/L — HIGH (ref 96–108)
CO2 SERPL-SCNC: 22 MMOL/L — SIGNIFICANT CHANGE UP (ref 22–31)
CREAT SERPL-MCNC: 1.15 MG/DL — SIGNIFICANT CHANGE UP (ref 0.5–1.3)
GLUCOSE SERPL-MCNC: 144 MG/DL — HIGH (ref 70–99)
POTASSIUM SERPL-MCNC: 3.9 MMOL/L — SIGNIFICANT CHANGE UP (ref 3.5–5.3)
POTASSIUM SERPL-SCNC: 3.9 MMOL/L — SIGNIFICANT CHANGE UP (ref 3.5–5.3)
SODIUM SERPL-SCNC: 143 MMOL/L — SIGNIFICANT CHANGE UP (ref 135–145)

## 2018-02-26 RX ORDER — ASPIRIN/CALCIUM CARB/MAGNESIUM 324 MG
1 TABLET ORAL
Qty: 0 | Refills: 0 | COMMUNITY

## 2018-02-26 RX ADMIN — Medication 88 MICROGRAM(S): at 06:06

## 2018-02-26 RX ADMIN — ESCITALOPRAM OXALATE 10 MILLIGRAM(S): 10 TABLET, FILM COATED ORAL at 11:14

## 2018-02-26 RX ADMIN — ENOXAPARIN SODIUM 30 MILLIGRAM(S): 100 INJECTION SUBCUTANEOUS at 11:15

## 2018-02-26 RX ADMIN — Medication 1 TABLET(S): at 11:14

## 2018-02-26 RX ADMIN — GABAPENTIN 100 MILLIGRAM(S): 400 CAPSULE ORAL at 06:06

## 2018-02-26 RX ADMIN — Medication 325 MILLIGRAM(S): at 11:14

## 2018-02-26 RX ADMIN — OXYCODONE HYDROCHLORIDE 10 MILLIGRAM(S): 5 TABLET ORAL at 03:55

## 2018-02-26 RX ADMIN — PANTOPRAZOLE SODIUM 40 MILLIGRAM(S): 20 TABLET, DELAYED RELEASE ORAL at 06:06

## 2018-02-26 RX ADMIN — MEMANTINE HYDROCHLORIDE 5 MILLIGRAM(S): 10 TABLET ORAL at 11:14

## 2018-02-26 RX ADMIN — SODIUM CHLORIDE 3 MILLILITER(S): 9 INJECTION INTRAMUSCULAR; INTRAVENOUS; SUBCUTANEOUS at 06:06

## 2018-02-26 RX ADMIN — SODIUM CHLORIDE 3 MILLILITER(S): 9 INJECTION INTRAMUSCULAR; INTRAVENOUS; SUBCUTANEOUS at 13:33

## 2018-02-26 NOTE — PROGRESS NOTE ADULT - SUBJECTIVE AND OBJECTIVE BOX
CHIEF COMPLAINT:    HPI:  · Chief Complaint: The patient is a 85y Female complaining of fall.	  · Unable to Obtain: Dementia	  · HPI Objective Statement: pt found down on floor by son this morning.  pt cannot say what happened.  son states that although she cannot remember what happened she appears normal to him right now.  no recent illness or increased confusion.  pt c/o pain to L groin	  Patient is a poor historian, no complaints at the time of exam.  Pain controlled.    EKG on admission showed V pacing rate 69. PM interrogation showed V pacing with normal VVIR PM function. Hgb has gone down from 13.5 to 12.0(). Renal function abnormal, pt on IVF, probable dehydration. No chest pain.    : creat and BUN higher. Hgb stable now. BP low. On IVF.    : BP and renal function better today. No complaints.    : BP stable. Would restart BB.    Vital Signs Last 24 Hrs  T(C): 37.3 (2018 14:37), Max: 37.3 (2018 14:37)  T(F): 99.1 (2018 14:37), Max: 99.1 (2018 14:37)  HR: 60 (2018 14:15) (60 - 84)  BP: 77/44 (2018 14:15) (77/44 - 119/75)  BP(mean): --  RR: 17 (2018 12:07) (16 - 18)  SpO2: 98% (2018 14:15) (96% - 100%) (2018 14:50)      PAST MEDICAL & SURGICAL HISTORY:  Anemia  Cataract: implants   Cardiac Arrest: - lead to CABG and mitral valve repair  Spinal Stenosis  Osteoarthritis: spine, joints  Hypothyroid: last TFT&#x27;s 2 weeks ago - normal  CAD (Coronary Artery Disease) of Bypass Graft: bypass times ,  2007 [facility]  Mitral valve repair: CAD 2007 placed on coumadin to remain as per Dr Fernandez  Hyperlipidemia  HTN - Hypertension:   Pacemaker: LACW  - Medtronic ADDROI/ [id number] - [facility]  Atrial Fibrillation:   on baby aspirin to maintain as per Dr Fernandez  Depression: 3 years  History of Tonsillectomy: as child  Blepharoptosis:  - bilateral  partial thyroidectomy:   Breast Cyst: left - benign   hysterectomy:   Facet block: of spine   Spinal Disorder: spinal stimulator trial  - then removed  pacemaker: LACW  Medtronic ADDROI/[id number]  mitral valve repair/ CAB bypass times 1 - [facility]      Allergies    amoxicillin (Rash)  Daypro (Other)  Keflex (Rash)  Tikosyn (Unknown)    Intolerances        Occupation:  Alochol: Denied  Smoking: Denied  Drug Use: Denied  Marital Status:         FAMILY HISTORY:  No pertinent family history in first degree relatives      REVIEW OF SYSTEMS:    CONSTITUTIONAL: No weakness, fevers or chills  EYES/ENT: No visual changes;  No vertigo or throat pain   NECK: No pain or stiffness  RESPIRATORY: No cough, wheezing, hemoptysis; No shortness of breath  CARDIOVASCULAR: No chest pain or palpitations  GASTROINTESTINAL: No abdominal or epigastric pain. No nausea, vomiting, or hematemesis; No diarrhea or constipation. No melena or hematochezia.  GENITOURINARY: No dysuria, frequency or hematuria  NEUROLOGICAL: No numbness or weakness  SKIN: No itching, burning, rashes, or lesions   All other review of systems is negative unless indicated above    Vital Signs Last 24 Hrs  T(C): 37 (2018 05:26), Max: 37.3 (2018 19:51)  T(F): 98.6 (2018 05:26), Max: 99.1 (2018 19:51)  HR: 61 (2018 05:26) (57 - 61)  BP: 90/40 (2018 05:26) (87/41 - 93/42)  BP(mean): --  RR: 17 (2018 05:26) (16 - 17)  SpO2: 96% (2018 05:26) (96% - 98%)    I&O's Summary    2018 07:01  -  2018 07:00  --------------------------------------------------------  IN: 240 mL / OUT: 0 mL / NET: 240 mL    Hg    PHYSICAL EXAM:    Constitutional: NAD, awake and alert, well-developed  HEENT: PERR, EOMI,  No oral cyananosis.  Neck:  supple,  No JVD  Respiratory: Breath sounds are clear bilaterally, No wheezing, rales or rhonchi  Cardiovascular: S1 and S2, regular rate and rhythm, no Murmurs, gallops or rubs  Gastrointestinal: Bowel Sounds present, soft, nontender.   Extremities: No peripheral edema. No clubbing or cyanosis.  Vascular: 2+ peripheral pulses  Neurological: A/O x 3, no focal deficits  Musculoskeletal: no calf tenderness.  Skin: No rashes.    MEDICATIONS:  MEDICATIONS  (STANDING):  calcium carbonate 500 mG (Tums) Chewable 1 Tablet(s) Chew daily  donepezil 10 milliGRAM(s) Oral at bedtime  enoxaparin Injectable 30 milliGRAM(s) SubCutaneous daily  escitalopram 10 milliGRAM(s) Oral daily  ferrous    sulfate 325 milliGRAM(s) Oral daily  gabapentin 100 milliGRAM(s) Oral two times a day  levothyroxine 88 MICROGram(s) Oral daily  memantine 5 milliGRAM(s) Oral daily  multivitamin 1 Tablet(s) Oral daily  pantoprazole    Tablet 40 milliGRAM(s) Oral before breakfast  sodium chloride 0.9% lock flush 3 milliLiter(s) IV Push every 8 hours  sodium chloride 0.9%. 1000 milliLiter(s) (75 mL/Hr) IV Continuous <Continuous>      LABS: All Labs Reviewed:                        12.0   4.8   )-----------( 83       ( 2018 02:11 )             36.3                         13.5   7.1   )-----------( 103      ( 2018 07:27 )             43.2     2018 07:27    136    |  101    |  22     ----------------------------<  90     3.9     |  29     |  1.44     Ca    8.8        2018 07:27  Mg     2.0       2018 07:27    TPro  7.7    /  Alb  4.1    /  TBili  1.1    /  DBili  x      /  AST  30     /  ALT  24     /  AlkPhos  177    2018 07:27          Blood Culture: Organism --  Gram Stain Blood -- Gram Stain --  Specimen Source .Urine None  Culture-Blood --            RADIOLOGY/EKG:

## 2018-02-26 NOTE — PROGRESS NOTE ADULT - SUBJECTIVE AND OBJECTIVE BOX
c/c: fall    HPI: 85F, pmh of dementia, spinal stenosis, CAD, CABG, Cardiac ablation, PPM, DM, Atrial fibrillation, HTN, GERD, hypothyroidism who presented to the ER after a fall. She was apparently found by her son. EMS was called and was brought to the ER. In ED found to have left superior/inferior pubic rami fractures and was admitted to trauma service. Hospitalist service consulted for medical comanagement. Events leading to fall are unclear. Patient has dementia and cannot provide accurate history.  Hospital course notable for hypotension/jc. Given ivf with improvement. Due to JC, patient transferred to hospitalist service.    2/25: pt seen and examined this am. Pain controlled. No sob/chest pain. Tolerating po.  2/26- doing good, for DC to [facility]     ROS: all 10 systems reviewed and is as above otherwise negative, but limited due to dementia    Vital Signs Last 24 Hrs  T(C): 36.9 (26 Feb 2018 04:57), Max: 36.9 (25 Feb 2018 16:15)  T(F): 98.4 (26 Feb 2018 04:57), Max: 98.5 (25 Feb 2018 16:15)  HR: 60 (26 Feb 2018 04:57) (60 - 60)  BP: 138/67 (26 Feb 2018 04:57) (138/67 - 140/65)  BP(mean): --  RR: 18 (26 Feb 2018 04:57) (17 - 18)  SpO2: 91% (26 Feb 2018 04:57) (91% - 91%)    PHYSICAL EXAM:  GENERAL: Elderly female, laying in bed, Comfortable, no acute distress   HEAD:  Normocephalic, atraumatic  EYES: EOMI, PERRLA  HEENT: moist  mucous membranes  NECK: Supple, No JVD  NERVOUS SYSTEM:  confused, non focal  CHEST/LUNG: Clear to auscultation bilaterally  HEART:s1, s2+ Regular rate and rhythm  ABDOMEN: Soft, Nontender, Nondistended, Bowel sounds present  GENITOURINARY: Voiding, no palpable bladder  EXTREMITIES:   No clubbing, cyanosis. trace b/l le edema  MUSCULOSKELETAL- Normal tone  SKIN-no rash    LABS:  25 Feb 2018 06:36    141    |  109    |  35     ----------------------------<  94     4.2     |  25     |  1.44     Ca    8.1        25 Feb 2018 06:36  Phos  3.0       25 Feb 2018 06:36  Mg     2.2       25 Feb 2018 06:36    ASSESSMENT AND PLAN:  1. Unwitnessed fall with left superior/inferior pubic rami fractures:  -can not rule out syncope  -noted to be hypotensive-->finally improving.  -continue to hold diuretics/antihypertensives  - ppm interrogation unremarkable.  -cardio consult appreciated, f/u echo  -CT head negative  -pain control  -physical therapy- to [facility] today if bed is available    2. JC:  -?atn from hypotension  -continue to hold diuretic/arb  -improved from yesterday-->dc ivf.  -nephrology eval appreciated  -renal sono- no obstruction    3. H/o HTN now with hypotension:  - arb/lasix/bb on hold  -bp finally improved  -dc ivf  -can restart antihypertensives upon discharge    4. HLD:  -statin    5. Atrial fibrillation:  -rate controlled  -hold bb for hypotension  -does not appear to be on a/c and would continue to hold off due to fall risk    6. ?CHF:  -hold lasix  -echo as outpatient  -CAN NOT DETERMINE IF SYSTOLIC OR DIASTOLIC WITHOUT ECHO    7. H/o CAD/CABG/MItral valve repair:  -hold bb for hypotension  -statin    8. Dementia:  -aricept/namenda    9. Depression:  -continue lexapro    10, Hypothyroid:  -continue synthroid    11. Thrombocytopenia:  -?new  -monitor  -will need outpt f/u    #Dispo- [facility] today if bed is available. ECHO as outpatient

## 2018-03-04 LAB
CORTICOSTEROID BINDING GLOBULIN RESULT: 3 MG/DL — SIGNIFICANT CHANGE UP
CORTIS F/TOTAL MFR SERPL: 3.5 % — SIGNIFICANT CHANGE UP
CORTIS SERPL-MCNC: 11 UG/DL — SIGNIFICANT CHANGE UP
CORTISOL, FREE RESULT: 0.38 UG/DL — SIGNIFICANT CHANGE UP

## 2018-03-06 DIAGNOSIS — E86.0 DEHYDRATION: ICD-10-CM

## 2018-03-06 DIAGNOSIS — S32.502A UNSPECIFIED FRACTURE OF LEFT PUBIS, INITIAL ENCOUNTER FOR CLOSED FRACTURE: ICD-10-CM

## 2018-03-06 DIAGNOSIS — E78.5 HYPERLIPIDEMIA, UNSPECIFIED: ICD-10-CM

## 2018-03-06 DIAGNOSIS — Z95.2 PRESENCE OF PROSTHETIC HEART VALVE: ICD-10-CM

## 2018-03-06 DIAGNOSIS — Z95.1 PRESENCE OF AORTOCORONARY BYPASS GRAFT: ICD-10-CM

## 2018-03-06 DIAGNOSIS — Z90.710 ACQUIRED ABSENCE OF BOTH CERVIX AND UTERUS: ICD-10-CM

## 2018-03-06 DIAGNOSIS — I48.2 CHRONIC ATRIAL FIBRILLATION: ICD-10-CM

## 2018-03-06 DIAGNOSIS — D69.6 THROMBOCYTOPENIA, UNSPECIFIED: ICD-10-CM

## 2018-03-06 DIAGNOSIS — I11.0 HYPERTENSIVE HEART DISEASE WITH HEART FAILURE: ICD-10-CM

## 2018-03-06 DIAGNOSIS — I95.9 HYPOTENSION, UNSPECIFIED: ICD-10-CM

## 2018-03-06 DIAGNOSIS — I25.10 ATHEROSCLEROTIC HEART DISEASE OF NATIVE CORONARY ARTERY WITHOUT ANGINA PECTORIS: ICD-10-CM

## 2018-03-06 DIAGNOSIS — Z88.1 ALLERGY STATUS TO OTHER ANTIBIOTIC AGENTS STATUS: ICD-10-CM

## 2018-03-06 DIAGNOSIS — Z95.0 PRESENCE OF CARDIAC PACEMAKER: ICD-10-CM

## 2018-03-06 DIAGNOSIS — E03.9 HYPOTHYROIDISM, UNSPECIFIED: ICD-10-CM

## 2018-03-06 DIAGNOSIS — F03.90 UNSPECIFIED DEMENTIA WITHOUT BEHAVIORAL DISTURBANCE: ICD-10-CM

## 2018-03-06 DIAGNOSIS — I50.9 HEART FAILURE, UNSPECIFIED: ICD-10-CM

## 2018-03-06 DIAGNOSIS — N17.0 ACUTE KIDNEY FAILURE WITH TUBULAR NECROSIS: ICD-10-CM

## 2018-04-10 ENCOUNTER — APPOINTMENT (OUTPATIENT)
Dept: ELECTROPHYSIOLOGY | Facility: CLINIC | Age: 83
End: 2018-04-10
Payer: MEDICARE

## 2018-04-10 DIAGNOSIS — Z95.0 PRESENCE OF CARDIAC PACEMAKER: ICD-10-CM

## 2018-04-10 DIAGNOSIS — I44.2 ATRIOVENTRICULAR BLOCK, COMPLETE: ICD-10-CM

## 2018-04-10 PROCEDURE — 93294 REM INTERROG EVL PM/LDLS PM: CPT

## 2018-04-10 PROCEDURE — 93296 REM INTERROG EVL PM/IDS: CPT

## 2018-04-12 PROBLEM — I44.2 AV BLOCK, 3RD DEGREE: Status: ACTIVE | Noted: 2017-04-27

## 2018-04-12 PROBLEM — Z95.0 PRESENCE OF PERMANENT CARDIAC PACEMAKER: Status: ACTIVE | Noted: 2017-03-07

## 2018-07-10 ENCOUNTER — APPOINTMENT (OUTPATIENT)
Dept: ELECTROPHYSIOLOGY | Facility: CLINIC | Age: 83
End: 2018-07-10

## 2018-10-09 ENCOUNTER — APPOINTMENT (OUTPATIENT)
Dept: ELECTROPHYSIOLOGY | Facility: CLINIC | Age: 83
End: 2018-10-09

## 2019-01-18 ENCOUNTER — APPOINTMENT (OUTPATIENT)
Dept: ELECTROPHYSIOLOGY | Facility: CLINIC | Age: 84
End: 2019-01-18

## 2019-02-18 NOTE — PATIENT PROFILE ADULT. - SOCIAL CONCERNS
----- Message from Cristin Napoles sent at 2/18/2019  2:14 PM CST -----  Contact: [facility]  Please call [facility] at [phone]    Patient is currently there and the therapist has questions (refused to give details)    Thank you   Patient unreliable historian

## 2019-02-25 NOTE — CONSULT NOTE ADULT - PROVIDER SPECIALTY LIST ADULT
Controlling High Blood Pressure  High blood pressure (hypertension) is often called the silent killer. This is because many people who have it dont know it. High blood pressure is defined as 140/90 mm Hg or higher. Know your blood pressure and remember to check it regularly. Doing so can save your life. Here are some things you can do to help control your blood pressure.    Choose heart-healthy foods  · Select low-salt, low-fat foods. Limit sodium intake to 2,400 mg per day or the amount suggested by your healthcare provider.  · Limit canned, dried, cured, packaged, and fast foods. These can contain a lot of salt.  · Eat 8 to 10 servings of fruits and vegetables every day.  · Choose lean meats, fish, or chicken.  · Eat whole-grain pasta, brown rice, and beans.  · Eat 2 to 3 servings of low-fat or fat-free dairy products.  · Ask your doctor about the DASH eating plan. This plan helps reduce blood pressure.  · When you go to a restaurant, ask that your meal be prepared with no added salt.  Maintain a healthy weight  · Ask your healthcare provider how many calories to eat a day. Then stick to that number.  · Ask your healthcare provider what weight range is healthiest for you. If you are overweight, a weight loss of only 3% to 5% of your body weight can help lower blood pressure. Generally, a good weight loss goal is to lose 10% of your body weight in a year.  · Limit snacks and sweets.  · Get regular exercise.  Get up and get active  · Choose activities you enjoy. Find ones you can do with friends or family. This includes bicycling, dancing, walking, and jogging.  · Park farther away from building entrances.  · Use stairs instead of the elevator.  · When you can, walk or bike instead of driving.  · Hext leaves, garden, or do household repairs.  · Be active at a moderate to vigorous level of physical activity for at least 40 minutes for a minimum of 3 to 4 days a week.   Manage stress  · Make time to relax and enjoy  Psychiatry life. Find time to laugh.  · Communicate your concerns with your loved ones and your healthcare provider.  · Visit with family and friends, and keep up with hobbies.  Limit alcohol and quit smoking  · Men should have no more than 2 drinks per day.  · Women should have no more than 1 drink per day.  · Talk with your healthcare provider about quitting smoking. Smoking significantly increases your risk for heart disease and stroke. Ask your healthcare provider about community smoking cessation programs and other options.  Medicines  If lifestyle changes arent enough, your healthcare provider may prescribe high blood pressure medicine. Take all medicines as prescribed. If you have any questions about your medicines, ask your healthcare provider before stopping or changing them.   Date Last Reviewed: 4/27/2016  © 6865-8479 [employer], [employer]. [address] [place] [address], [place] [address]. All rights reserved. This information is not intended as a substitute for professional medical care. Always follow your healthcare professional's instructions.

## 2019-05-24 NOTE — ED PROVIDER NOTE - NS ED MD EM SELECTION
no chills/no wheezing/no shortness of breath/no chest pain/no fever/no headache [address] Critical Care - 30 to 74 minutes

## 2019-12-08 NOTE — PATIENT PROFILE ADULT. - ANESTHESIA, PREVIOUS REACTION, PROFILE
Herbert Weber 24 y.o. presents complaining of low back pain for several days.   Family history:  significant for chronic low back pain. Pancreatic cancer and patient's maternal grandmother  Medical history noncontributory   Patient  reports that he has never smoked. He has never used smokeless tobacco. He reports that he does not drink alcohol.  No family history on file.  Review of systems: Negative except neck pain, back pain worsened my standing  Physical exam:  Gen. appearance: Uncomfortable  Vital signs:  Vitals:    12/06/19 1220   BP: 124/80   Pulse: 67   Temp: 98.1 °F (36.7 °C)     Spine: No scoliosis, no kyphosis nontender to palpation.  on from the thoracic spine to the lumbar spine negative straight leg raise bilaterally.  Neuro: Cranial nerves II through XII grossly intact, motor exam 5 out of 5 all extremities  No gait disturbances, sensation intact in all distal extremities  Vascular: Distal pulses palpable throughout good capillary refill in all nail beds  Skin: Good turgor no rashes or lesions  Mental status exam: Grossly intact    Impression:Back Pain                     Muscle spasm                   Plan: Referred to the med card dated 12/07/2019    cyclobenzaprine (FLEXERIL) 10 MG tablet     diclofenac (VOLTAREN) 50 MG EC tablet      Patient given back exercises to perform after one week  If no better in 2 weeks patient followup.    Current Outpatient Medications:     cyclobenzaprine (FLEXERIL) 10 MG tablet, Take 1 tablet (10 mg total) by mouth 3 (three) times daily as needed., Disp: 30 tablet, Rfl: 1    diclofenac (VOLTAREN) 50 MG EC tablet, Take 1 tablet (50 mg total) by mouth 2 (two) times daily., Disp: 40 tablet, Rfl: 1      Recent Lab Results       12/06/19  1237        Color, UA Yellow     Bilirubin Negative     Spec Grav UA 1.005     pH, UA 8     Protein Negative     Urobilinogen, UA Normal     Nitrite, UA Negative     Glucose, UA Normal     Ketones, UA Negative     RBC, UA Negative      WBC, UA Negative           Patient unreliable historian

## 2020-07-10 NOTE — ED PROVIDER NOTE - ASSOCIATED PAIN OR INJURY
History of Present Illness


Admission Date/PCP: 


  07/10/20 15:44





  





Patient complains of: Abdominal pain


History of Present Illness: 


SPIKE HARLEY is a 29 year old female


Presents emergency department via ground rescue complaining of a 1.5-day history

of abdominal pain localized to the suprapubic area and right lower quadrant.  

Patient was seen in the emergency department where she is found to have right 

lower quadrant tenderness and a leukocytosis of 18,000.  A CT scan of the 

abdomen was performed without oral contrast which showed a thickened appendix, 

fluid-filled consistent with early appendicitis, no evidence of free air; 

minimal fluid in the pelvis.  Surgery was consulted.  COVID-19 test obtained 

which was negative.





Patient reports having some crampy abdominal pain, loose stools diarrhea all 

fairly new in onset several weeks ago.  She saw gastroenterologist at [facility] within the past 2 weeks who recommended fiber.  No procedures were perf

ormed.  Patient denies family history of inflammatory bowel disease, trauma, or 

previous abdominal problems.  She denies constipation.





Past Medical History


Medical History: None


Psychiatric Medical History: Reports: Depression





Past Surgical History


Past Surgical History: Reports: Tonsillectomy





Social History


Information Source: Patient


Smoking Status: Never Smoker


Electronic Cigarette use?: No


Frequency of Alcohol Use: Rare


Hx Recreational Drug Use: No





Family History


Family History: None


Parental Family History Reviewed: No


Children Family History Reviewed: No


Sibling(s) Family History Reviewed.: No





Medication/Allergy


Allergies/Adverse Reactions: 


                                        





No Known Allergies Allergy (Verified 07/10/20 11:30)


   











Review of Systems


Constitutional: PRESENT: as per HPI


Eyes: ABSENT: visual disturbances


Ears: ABSENT: hearing changes


Cardiovascular: ABSENT: chest pain, dyspnea on exertion, edema, orthropnea, 

palpitations


Respiratory: ABSENT: cough, hemoptysis


Gastrointestinal: PRESENT: as per HPI


Genitourinary: ABSENT: dysuria, hematuria


Musculoskeletal: ABSENT: joint swelling


Integumentary: ABSENT: rash, wounds


Neurological: ABSENT: abnormal gait, abnormal speech, confusion, dizziness, 

focal weakness, syncope


Psychiatric: ABSENT: anxiety, depression, homidical ideation, suicidal ideation


Hematologic/Lymphatic: ABSENT: easy bleeding, easy bruising





Physical Exam


Vital Signs: 


                                        











Temp Pulse Resp BP Pulse Ox


 


 97.7 F   93   20   115/75   100 


 


 07/10/20 09:15  07/10/20 07:30  07/10/20 07:30  07/10/20 07:30  07/10/20 07:30








                                 Intake & Output











 07/09/20 07/10/20 07/11/20





 06:59 06:59 06:59


 


Intake Total   1000


 


Balance   1000


 


Weight   59 kg











General appearance: PRESENT: no acute distress


Head exam: PRESENT: normocephalic


Eye exam: PRESENT: EOMI


Mouth exam: PRESENT: dry mucosa


Neck exam: PRESENT: full ROM


Respiratory exam: PRESENT: clear to auscultation patience


Cardiovascular exam: PRESENT: RRR


Pulses: PRESENT: normal carotid pulses, normal radial pulses, normal femoral 

pulses, normal dorsalis pedis pul


GI/Abdominal exam: PRESENT: guarding, other - Tender right lower quadrant with 

guarding to moderate palpation.  No rigidity.  No umbilical groin hernias.


Rectal exam: PRESENT: deferred


Extremities exam: PRESENT: full ROM


Musculoskeletal exam: PRESENT: full ROM


Neurological exam: PRESENT: oriented to person, oriented to place, oriented to 

time, oriented to situation


Psychiatric exam: PRESENT: appropriate affect


Skin exam: PRESENT: dry





Results


Laboratory Results: 


                                        





                                 07/10/20 10:50 





                                 07/10/20 10:50 





                                        











  07/10/20 07/10/20 07/10/20





  10:50 10:50 10:50


 


WBC  18.4 H  


 


RBC  4.70  


 


Hgb  14.8  


 


Hct  43.1  


 


MCV  92  


 


MCH  31.4  


 


MCHC  34.3  


 


RDW  12.4  


 


Plt Count  182  


 


Seg Neutrophils %  Not Reportable  


 


Sodium   137.5 


 


Potassium   4.0 


 


Chloride   108 H 


 


Carbon Dioxide   23 


 


Anion Gap   7 


 


BUN   11 


 


Creatinine   0.55 


 


Est GFR ( Amer)   > 60 


 


Glucose   107 


 


Calcium   8.6 


 


Total Bilirubin   1.0 


 


AST   35 


 


Alkaline Phosphatase   52 


 


Total Protein   7.0 


 


Albumin   4.0 


 


Urine Color    YELLOW


 


Urine Appearance    CLEAR


 


Urine pH    8.0


 


Ur Specific Gravity    1.018


 


Urine Protein    30 H


 


Urine Glucose (UA)    50 H


 


Urine Ketones    80 H


 


Urine Blood    NEGATIVE


 


Urine RBC (Auto)    2











Impressions: 


                                        





Abdomen/Pelvis CT  07/10/20 12:10


IMPRESSION:  The appendix appears mildly prominent and fluid-filled.  There is a

paucity of intra-abdominal fat, limiting evaluation for associated inflammatory 

changes.  Otherwise unremarkable CT appearance of the abdomen and pelvis.


 














Assessment & Plan





- Diagnosis


(1) Acute appendicitis


Qualifiers: 


   Acute appendicitis type: unspecified acute appendicitis type   Qualified 

Code(s): K35.80 - Unspecified acute appendicitis   


Is this a current diagnosis for this admission?: Yes   


Plan: 


Impression: Right lower quadrant abdominal pain and tenderness, localized, le

ukocytosis and CT scan findings consistent with appendicitis.  Anatomically the 

appendix is retrocecal, possibly explaining patient's acute abdominal pain for 

the prior several weeks.





Recommendations:





1.  Admit to surgical service, n.p.o., IV fluids; I have offered her interval 

laparoscopic, possible open appendectomy, general anesthesia, this evening.  

Risk benefits and alternatives to the planned procedure explained the patient 

including bleeding, infection, injury to associated structures.  She expresses 

her understanding agrees to proceed.





2.  Anticipate discharge home the next 24 hours.








- Time


Time Spent: 30 to 50 Minutes


Smoking Cessation Education: over 10 minutes


Medications reviewed and adjusted accordingly: Yes


Anticipated discharge: Home
no discrete location documentation necessary

## 2020-12-23 NOTE — DISCHARGE NOTE ADULT - PROVIDER TOKENS
no wheezes/diminished breath sounds, L respirations non-labored/clear to auscultation bilaterally/no wheezes clear to auscultation bilaterally/no wheezes clear to auscultation bilaterally/no wheezes clear to auscultation bilaterally/no wheezes FREE:[LAST:[primary medical doctor],PHONE:[(   )    -],FAX:[(   )    -]],FREE:[LAST:[private cardiologist],PHONE:[(   )    -],FAX:[(   )    -]],TOKEN:'[id number]' TOKEN:'[id number]:'[id number]',TOKEN:'[id number]'

## 2022-06-06 NOTE — PHYSICAL THERAPY INITIAL EVALUATION ADULT - PRECAUTIONS/LIMITATIONS, REHAB EVAL
Pt resting comfortably on cart. Call light within reach. Respirations clear and unlabored at this time. Pt speaking in clear sentences. Denies further needs at this time. No signs of distress noted at this time. Pt on telemetry, VSS.     fall precautions/cardiac precautions

## 2022-12-19 NOTE — H&P ADULT - ENMT
Has The Cancer Been Biopsied Before?: has been previously biopsied
How Many Lesions Are To Be Evaluated?: 2
Has This Lesion Been Treated Previously?: not been treated
Is This A New Presentation, Or A Follow-Up?: Mohs Surgery Consultation
Who Referred The Patient?: Dayana Mullins, PAC
What Is The Location Of The First Lesion?: Right lateral forehead
What Is The Location Of The Second Lesion?: Left superior central forehead
When Was Your Biopsy?: 11/21/2022
detailed exam

## 2022-12-29 NOTE — ED PROVIDER NOTE - CROS ED ENMT ALL NEG
Pt c/o new onset dizziness while lying in bed. No chest pain or sob noted.   EKG ordered and being obtained.    negative...

## 2023-02-01 NOTE — SBIRT NOTE. - NSSBIRTFULLSCREEN_GEN_A_ED
Quality 110: Preventive Care And Screening: Influenza Immunization: Influenza Immunization Administered during Influenza season
Severity of Illness
Detail Level: Generalized
Quality 130: Documentation Of Current Medications In The Medical Record: Current Medications Documented

## 2023-07-25 NOTE — PHYSICAL THERAPY INITIAL EVALUATION ADULT - HEALTH SCREEN CRITERIA
[FreeTextEntry1] : 63y female PM htn, dm, hypothyroidism here for follow up of whooshing sound without hearing loss or vertigo. Most likely pulsatile tinnitus as the sound is more likely to occur when laying flat, and has become less frequent as her BP have improved.  MRI and U/S performed to investigate any vascular causes were only notable for mild white matter disease. She feels well and is a non-smoker.  Suspect pulsatile tinnitus secondary to uncontrolled HTN currently resolved with BP control. \par \par Improving Pulsatile Tinnitus\par - counseled on stroke prevention by continuing her management of DM and HTN\par - log BPs at home\par - RTC prn
yes

## 2023-08-25 NOTE — ED ADULT TRIAGE NOTE - TEMPERATURE IN CELSIUS (DEGREES C)
-- DO NOT REPLY / DO NOT REPLY ALL --  -- Message is from Engagement Center Operations (ECO) --    General Patient Message: Please call back in regards to the heart monitor the patient needs. Caller states their insurance never was informed of this caller has a phone number to get this approved it is [phone] [facility], [facility].    Caller Information       Type Contact Phone/Fax    08/25/2023 11:08 AM CDT Phone (Incoming) NOEMY ANDERSON (Emergency Contact) [phone]        Alternative phone number:     Can a detailed message be left? Yes    Message Turnaround: [place]:    Refer to site's KB page for routing instructions    Please give this turnaround time to the caller:   \"You can expect to receive a response 1-3 business days after your provider's clinical team reviews the message\"               36.9

## 2024-03-15 NOTE — PATIENT PROFILE ADULT. - COMFORT LEVEL, ACCEPTABLE
[FreeTextEntry1] : 12/29/2020 (R) b/l screening mammogram showing scattered areas of fibroglandular density. No suspicious masses, architectural distortion, or significant calcifications are detected. Scattered morphologically benign appearing calcifications are present in both breasts. Benign findings. No mammographic evidence of malignancy. No significant change. Annual screening. BI-RADS Category 2: Benign.  9/29/2021 ([facility]) Right DX MMG/US: There are scattered calcifications in the central right breast and upper outer quadrant of the right breast which mostly appear to layer dependently on the 90 degree lateral views, consistent with benign milk of calcium. IMPRESSION: Right breast calcifications are probably benign and should be followed up with diagnostic right mammogram in 6 months. Right nipple discharge is without a mammographic or sonographic correlate. Bilateral breast MRI with and without intravenous contrast is recommended. Scattered density. FOLLOW-UP: Breast MRI. BI-RADS Category 0: Incomplete.  10/6/2021 ([facility]) B/L MRI: No MR evidence of malignancy. Specifically, there is no suspicious enhancement in the right breast to explain patient's symptoms. Further management of a palpable finding should be based on clinical grounds. Patient will be due for short-term mammographic follow-up of the right breast in March 2022, according to diagnostic workup recommendations dated 9/29/2021. FOLLOW-UP: Clinical correlation and assessment. BI-RADS Category 1: Negative  12/31/2021 ([facility]) B/L sMMG: No suspicious masses, architectural distortion, or significant calcifications are detected. Arterial vascular calcifications are present. Scattered density. FOLLOW-UP: Annual screening. BI-RADS Category 1: Negative  1/23/23 (R) B/L sMMG: scattered areas of fbg density. There is a grouping of amorphous microcalcifications in the central right breast which is mammographically stable.  No findings suspicious for malignancy. FOLLOW-UP: Follow-up imaging in 12 months. BI-RADS 2:  Benign.  3/9/24 (R) B/L sMMG: scattered areas of fbg density. Arterial vascular calcifications are present. IMPRESSION: No mammographic evidence of malignancy. FOLLOW-UP: Annual screening. BI-RADS 2:  Benign. 0

## 2024-04-09 NOTE — ED ADULT NURSE NOTE - ASSOCIATED SYMPTOMS
Unit RN to OR RN
weakness, tingling in bilateral arms. nose bleed for 3 days intermittent. blood in stool since this morning.

## 2024-09-20 NOTE — ED ADULT NURSE NOTE - FALL HARM RISK CONCLUSION
Quyen ordered Breo Ellipta for the patient and it is too expensive. Can our office help her get it cheaper? Please call patient to advise.    Fall with Harm Risk

## 2024-10-23 NOTE — PATIENT PROFILE ADULT. - HEALTHCARE INFORMATION NEEDED, PROFILE
How Many Skin Cancers Have You Had?: more than one
What Is The Reason For Today's Visit?: History of Non-Melanoma Skin Cancer
When Was Your Last Cancer Diagnosed?: 2024
declines

## 2024-12-26 NOTE — PATIENT PROFILE ADULT. - PRO INTERPRETER NEED 2
Pt in for HGB and EKG. Hgb drawn and sent to lab. EKG today and previous EKG, Echo 3/19/24 and cardiology office note with cardiac risk assessment dated 7/18/24 sent to anesthesiologist for review. Chart marked for lab result review.   English

## 2025-05-21 NOTE — PATIENT PROFILE ADULT. - CENTRAL VENOUS CATHETER
Physical Therapy Visit    Visit Type: Daily Treatment Note  Visit: 9  Referring Provider: Sid Garcia MD  Medical Diagnosis (from order): M54.9 - Back pain, unspecified back location, unspecified back pain laterality, unspecified chronicity     SUBJECTIVE                                                                                                               Shares no pain today, has not had any major pains. After last session, was feeling pretty good. Has been trying to do more things, expanding her movements. Has been taking it easy with loaded rotations such as shoveling, vacuuming or sweeping.     Does feel fatigue and tightness in the back at the end of the day.     Functional Change: Able to get back to doing more around the house, doing yard work.     Pain / Symptoms  - Pain rating (out of 10): Current: 0       OBJECTIVE                                                                                                                                       Treatment     Therapeutic Exercise  Therapeutic exercises completed to improve, restore, facilitate, or enhance range of motion, flexibility and/or strength and decrease functional limitations:    NuStep - 6 minutes   -Subjective taken    Cat camel x 20  Thread the needle x 20   Sidelying partial Marshallese get up, trunk rotation x 10 bilateral  -5# db x 10 bilateral  Walking lunges with trunk rotation 2 x 30'  Walking dynamic hamstring sweeps 2 x 30'  Walking warrior III 2 x 30'    Cable column:  High to low chops with rope 17.5# x 15 bilateral  Single arm row with trunk rotation 17.5# x 15 bilateral        Skilled input: as detailed above, verbal instruction/cues, tactile instruction/cues and demonstration    Writer verbally educated and received verbal consent for hand placement, positioning of patient, and techniques to be performed today from patient for clothing adjustments for techniques, hand placement and palpation for techniques and therapist  position for techniques as described above and how they are pertinent to the patient's plan of care.  Home Exercise Program  Access Code: [email or web address]  URL: [email or web address]  Date: 04/23/2025  Prepared by: Darryl Aguilar    Program Notes  Short term: lumbar braceSit with lumbar support, sleep with pillows between knees    Exercises  - Supine Hamstring Stretch  - 1 x daily - 7 x weekly - 3 sets - 10 reps  - Supine Sciatic Nerve Glide  - 1 x daily - 7 x weekly - 3 sets - 10 reps  - Supine Posterior Pelvic Tilt  - 1 x daily - 7 x weekly - 3 sets - 10 reps  - Hooklying Clamshell with Resistance  - 1 x daily - 7 x weekly - 3 sets - 10 reps  - Supine Multifidus with Heel Press  - 1 x daily - 7 x weekly - 3 sets - 10 reps  - Supine Bridge with Spinal Articulation  - 1 x daily - 7 x weekly - 3 sets - 10 reps  - Seated Hip Flexion Toward Target  - 1 x daily - 7 x weekly - 3 sets - 10 reps      ASSESSMENT                                                                                                            Patient requires skilled therapy to address functional deficits in lumbar pain and symptoms. Patient response to increased motion and load to lumbar spine was positive and had no exacerbation immediately following. Patient demonstrates less hesitancy with transfers and spinal movements. Patient will continue to benefit from skilled therapy for strength and conditioning, pain management, functional mobility, a gradual increase in load and activity tolerance, to achieve goals, and return to prior level of function.   Pain/symptoms after session (out of 10): 0  Education:   - Results of above outlined education: Verbalizes understanding    PLAN                                                                                                                           Suggestions for next session as indicated: Progress per plan of care    Elliptical  Consider assessing for progress note or  discharge         Therapy procedure time and total treatment time can be found documented on the Time Entry flowsheet     no